# Patient Record
Sex: FEMALE | Race: WHITE | Employment: FULL TIME | URBAN - METROPOLITAN AREA
[De-identification: names, ages, dates, MRNs, and addresses within clinical notes are randomized per-mention and may not be internally consistent; named-entity substitution may affect disease eponyms.]

---

## 2020-09-18 ENCOUNTER — APPOINTMENT (OUTPATIENT)
Dept: GENERAL RADIOLOGY | Age: 60
DRG: 287 | End: 2020-09-18
Attending: EMERGENCY MEDICINE
Payer: COMMERCIAL

## 2020-09-18 ENCOUNTER — HOSPITAL ENCOUNTER (INPATIENT)
Age: 60
LOS: 2 days | Discharge: HOME | DRG: 287 | End: 2020-09-20
Attending: EMERGENCY MEDICINE | Admitting: INTERNAL MEDICINE
Payer: COMMERCIAL

## 2020-09-18 DIAGNOSIS — I10 HYPERTENSION, UNCONTROLLED: ICD-10-CM

## 2020-09-18 DIAGNOSIS — I48.91 ATRIAL FIBRILLATION WITH RVR (HCC): Primary | ICD-10-CM

## 2020-09-18 PROBLEM — C55 UTERINE CANCER (HCC): Status: ACTIVE | Noted: 2020-09-18

## 2020-09-18 PROBLEM — R07.9 CHEST PAIN: Status: ACTIVE | Noted: 2020-09-18

## 2020-09-18 PROBLEM — I48.0 PAROXYSMAL ATRIAL FIBRILLATION (HCC): Status: ACTIVE | Noted: 2020-09-18

## 2020-09-18 PROBLEM — E78.2 MIXED HYPERLIPIDEMIA: Status: ACTIVE | Noted: 2020-09-18

## 2020-09-18 LAB
ALBUMIN SERPL-MCNC: 4 G/DL (ref 3.2–4.6)
ALBUMIN/GLOB SERPL: 1 {RATIO} (ref 1.2–3.5)
ALP SERPL-CCNC: 142 U/L (ref 50–136)
ALT SERPL-CCNC: 89 U/L (ref 12–65)
ANION GAP SERPL CALC-SCNC: 11 MMOL/L (ref 7–16)
AST SERPL-CCNC: 47 U/L (ref 15–37)
BASOPHILS # BLD: 0.1 K/UL (ref 0–0.2)
BASOPHILS NFR BLD: 1 % (ref 0–2)
BILIRUB SERPL-MCNC: 0.2 MG/DL (ref 0.2–1.1)
BUN SERPL-MCNC: 20 MG/DL (ref 8–23)
CALCIUM SERPL-MCNC: 8.9 MG/DL (ref 8.3–10.4)
CHLORIDE SERPL-SCNC: 103 MMOL/L (ref 98–107)
CO2 SERPL-SCNC: 24 MMOL/L (ref 21–32)
CREAT SERPL-MCNC: 1.12 MG/DL (ref 0.6–1)
DIFFERENTIAL METHOD BLD: ABNORMAL
EOSINOPHIL # BLD: 0.1 K/UL (ref 0–0.8)
EOSINOPHIL NFR BLD: 1 % (ref 0.5–7.8)
ERYTHROCYTE [DISTWIDTH] IN BLOOD BY AUTOMATED COUNT: 14.8 % (ref 11.9–14.6)
GLOBULIN SER CALC-MCNC: 4.1 G/DL (ref 2.3–3.5)
GLUCOSE SERPL-MCNC: 205 MG/DL (ref 65–100)
HCT VFR BLD AUTO: 44.9 % (ref 35.8–46.3)
HGB BLD-MCNC: 14.6 G/DL (ref 11.7–15.4)
IMM GRANULOCYTES # BLD AUTO: 0 K/UL (ref 0–0.5)
IMM GRANULOCYTES NFR BLD AUTO: 0 % (ref 0–5)
LYMPHOCYTES # BLD: 1.9 K/UL (ref 0.5–4.6)
LYMPHOCYTES NFR BLD: 11 % (ref 13–44)
MAGNESIUM SERPL-MCNC: 2 MG/DL (ref 1.8–2.4)
MCH RBC QN AUTO: 28.7 PG (ref 26.1–32.9)
MCHC RBC AUTO-ENTMCNC: 32.5 G/DL (ref 31.4–35)
MCV RBC AUTO: 88.4 FL (ref 79.6–97.8)
MONOCYTES # BLD: 1.3 K/UL (ref 0.1–1.3)
MONOCYTES NFR BLD: 8 % (ref 4–12)
NEUTS SEG # BLD: 13.3 K/UL (ref 1.7–8.2)
NEUTS SEG NFR BLD: 80 % (ref 43–78)
NRBC # BLD: 0 K/UL (ref 0–0.2)
PLATELET # BLD AUTO: 281 K/UL (ref 150–450)
PMV BLD AUTO: 10.6 FL (ref 9.4–12.3)
POTASSIUM SERPL-SCNC: 3.5 MMOL/L (ref 3.5–5.1)
PROCALCITONIN SERPL-MCNC: <0.05 NG/ML
PROT SERPL-MCNC: 8.1 G/DL (ref 6.3–8.2)
RBC # BLD AUTO: 5.08 M/UL (ref 4.05–5.2)
SODIUM SERPL-SCNC: 138 MMOL/L (ref 136–145)
T4 FREE SERPL-MCNC: 1 NG/DL (ref 0.78–1.46)
TROPONIN-HIGH SENSITIVITY: 126.9 PG/ML (ref 0–14)
TSH SERPL DL<=0.005 MIU/L-ACNC: 2.59 UIU/ML (ref 0.36–3.74)
WBC # BLD AUTO: 16.7 K/UL (ref 4.3–11.1)

## 2020-09-18 PROCEDURE — 84439 ASSAY OF FREE THYROXINE: CPT

## 2020-09-18 PROCEDURE — 71045 X-RAY EXAM CHEST 1 VIEW: CPT

## 2020-09-18 PROCEDURE — 85025 COMPLETE CBC W/AUTO DIFF WBC: CPT

## 2020-09-18 PROCEDURE — 84484 ASSAY OF TROPONIN QUANT: CPT

## 2020-09-18 PROCEDURE — 96365 THER/PROPH/DIAG IV INF INIT: CPT

## 2020-09-18 PROCEDURE — 2709999900 HC NON-CHARGEABLE SUPPLY

## 2020-09-18 PROCEDURE — 74011000250 HC RX REV CODE- 250: Performed by: EMERGENCY MEDICINE

## 2020-09-18 PROCEDURE — 93005 ELECTROCARDIOGRAM TRACING: CPT | Performed by: EMERGENCY MEDICINE

## 2020-09-18 PROCEDURE — 99285 EMERGENCY DEPT VISIT HI MDM: CPT

## 2020-09-18 PROCEDURE — 80053 COMPREHEN METABOLIC PANEL: CPT

## 2020-09-18 PROCEDURE — 83735 ASSAY OF MAGNESIUM: CPT

## 2020-09-18 PROCEDURE — 74011000258 HC RX REV CODE- 258: Performed by: EMERGENCY MEDICINE

## 2020-09-18 PROCEDURE — 74011250636 HC RX REV CODE- 250/636: Performed by: INTERNAL MEDICINE

## 2020-09-18 PROCEDURE — 96376 TX/PRO/DX INJ SAME DRUG ADON: CPT

## 2020-09-18 PROCEDURE — 84443 ASSAY THYROID STIM HORMONE: CPT

## 2020-09-18 PROCEDURE — 84145 PROCALCITONIN (PCT): CPT

## 2020-09-18 PROCEDURE — 65660000000 HC RM CCU STEPDOWN

## 2020-09-18 PROCEDURE — 74011250636 HC RX REV CODE- 250/636: Performed by: EMERGENCY MEDICINE

## 2020-09-18 PROCEDURE — 74011250637 HC RX REV CODE- 250/637: Performed by: INTERNAL MEDICINE

## 2020-09-18 RX ORDER — HYDROCODONE BITARTRATE AND ACETAMINOPHEN 5; 325 MG/1; MG/1
1 TABLET ORAL
Status: DISCONTINUED | OUTPATIENT
Start: 2020-09-18 | End: 2020-09-19 | Stop reason: SDUPTHER

## 2020-09-18 RX ORDER — LISINOPRIL 40 MG/1
40 TABLET ORAL DAILY
Status: ON HOLD | COMMUNITY
End: 2020-09-20 | Stop reason: SDUPTHER

## 2020-09-18 RX ORDER — ACETAMINOPHEN 325 MG/1
650 TABLET ORAL
Status: DISCONTINUED | OUTPATIENT
Start: 2020-09-18 | End: 2020-09-19 | Stop reason: SDUPTHER

## 2020-09-18 RX ORDER — SODIUM CHLORIDE 0.9 % (FLUSH) 0.9 %
5-40 SYRINGE (ML) INJECTION EVERY 8 HOURS
Status: DISCONTINUED | OUTPATIENT
Start: 2020-09-18 | End: 2020-09-20 | Stop reason: HOSPADM

## 2020-09-18 RX ORDER — HEPARIN SODIUM 5000 [USP'U]/ML
5000 INJECTION, SOLUTION INTRAVENOUS; SUBCUTANEOUS ONCE
Status: COMPLETED | OUTPATIENT
Start: 2020-09-18 | End: 2020-09-18

## 2020-09-18 RX ORDER — ROSUVASTATIN CALCIUM 20 MG/1
40 TABLET, COATED ORAL
Status: DISCONTINUED | OUTPATIENT
Start: 2020-09-18 | End: 2020-09-20 | Stop reason: HOSPADM

## 2020-09-18 RX ORDER — FUROSEMIDE 40 MG/1
40 TABLET ORAL
COMMUNITY

## 2020-09-18 RX ORDER — ROSUVASTATIN CALCIUM 10 MG/1
10 TABLET, COATED ORAL
Status: DISCONTINUED | OUTPATIENT
Start: 2020-09-18 | End: 2020-09-18

## 2020-09-18 RX ORDER — CARVEDILOL 6.25 MG/1
6.25 TABLET ORAL 2 TIMES DAILY WITH MEALS
COMMUNITY
End: 2020-09-20

## 2020-09-18 RX ORDER — DILTIAZEM HYDROCHLORIDE 5 MG/ML
10 INJECTION INTRAVENOUS ONCE
Status: COMPLETED | OUTPATIENT
Start: 2020-09-18 | End: 2020-09-18

## 2020-09-18 RX ORDER — CLONIDINE HYDROCHLORIDE 0.1 MG/1
0.1 TABLET ORAL EVERY 12 HOURS
Status: DISCONTINUED | OUTPATIENT
Start: 2020-09-18 | End: 2020-09-19

## 2020-09-18 RX ORDER — GUAIFENESIN 100 MG/5ML
81 LIQUID (ML) ORAL DAILY
Status: DISCONTINUED | OUTPATIENT
Start: 2020-09-19 | End: 2020-09-20 | Stop reason: HOSPADM

## 2020-09-18 RX ORDER — ROSUVASTATIN CALCIUM 10 MG/1
10 TABLET, COATED ORAL
Status: ON HOLD | COMMUNITY
End: 2020-09-20 | Stop reason: SDUPTHER

## 2020-09-18 RX ORDER — METOPROLOL TARTRATE 25 MG/1
25 TABLET, FILM COATED ORAL EVERY 6 HOURS
Status: DISCONTINUED | OUTPATIENT
Start: 2020-09-18 | End: 2020-09-19

## 2020-09-18 RX ORDER — CHLORTHALIDONE 25 MG/1
25 TABLET ORAL DAILY
Status: DISCONTINUED | OUTPATIENT
Start: 2020-09-19 | End: 2020-09-19

## 2020-09-18 RX ORDER — LISINOPRIL 20 MG/1
40 TABLET ORAL DAILY
Status: DISCONTINUED | OUTPATIENT
Start: 2020-09-19 | End: 2020-09-19

## 2020-09-18 RX ORDER — HEPARIN SODIUM 5000 [USP'U]/100ML
12-25 INJECTION, SOLUTION INTRAVENOUS
Status: DISCONTINUED | OUTPATIENT
Start: 2020-09-18 | End: 2020-09-18 | Stop reason: SDUPTHER

## 2020-09-18 RX ORDER — CLONIDINE HYDROCHLORIDE 0.1 MG/1
0.1 TABLET ORAL 2 TIMES DAILY
COMMUNITY
End: 2020-09-20

## 2020-09-18 RX ORDER — NITROGLYCERIN 0.4 MG/1
0.4 TABLET SUBLINGUAL
Status: DISCONTINUED | OUTPATIENT
Start: 2020-09-18 | End: 2020-09-20 | Stop reason: HOSPADM

## 2020-09-18 RX ORDER — SODIUM CHLORIDE 0.9 % (FLUSH) 0.9 %
5-40 SYRINGE (ML) INJECTION AS NEEDED
Status: DISCONTINUED | OUTPATIENT
Start: 2020-09-18 | End: 2020-09-20 | Stop reason: HOSPADM

## 2020-09-18 RX ORDER — HEPARIN SODIUM 5000 [USP'U]/100ML
12-25 INJECTION, SOLUTION INTRAVENOUS
Status: DISCONTINUED | OUTPATIENT
Start: 2020-09-18 | End: 2020-09-20

## 2020-09-18 RX ORDER — CHLORTHALIDONE 25 MG/1
25 TABLET ORAL DAILY
COMMUNITY
End: 2020-09-20

## 2020-09-18 RX ADMIN — SODIUM CHLORIDE 12.5 MG/HR: 900 INJECTION, SOLUTION INTRAVENOUS at 20:15

## 2020-09-18 RX ADMIN — METOPROLOL TARTRATE 25 MG: 25 TABLET, FILM COATED ORAL at 20:44

## 2020-09-18 RX ADMIN — HEPARIN SODIUM 12 UNITS/KG/HR: 5000 INJECTION, SOLUTION INTRAVENOUS at 22:35

## 2020-09-18 RX ADMIN — DILTIAZEM HYDROCHLORIDE 10 MG: 5 INJECTION INTRAVENOUS at 19:15

## 2020-09-18 RX ADMIN — ROSUVASTATIN CALCIUM 40 MG: 20 TABLET, COATED ORAL at 22:37

## 2020-09-18 RX ADMIN — HEPARIN SODIUM 5000 UNITS: 5000 INJECTION INTRAVENOUS; SUBCUTANEOUS at 22:37

## 2020-09-18 RX ADMIN — SODIUM CHLORIDE 10 MG/HR: 900 INJECTION, SOLUTION INTRAVENOUS at 19:22

## 2020-09-18 RX ADMIN — DILTIAZEM HYDROCHLORIDE 10 MG: 5 INJECTION INTRAVENOUS at 20:16

## 2020-09-18 NOTE — ED PROVIDER NOTES
Presents with complaint of chest pain that started several hours prior to arrival.  Patient is from [facility] and has a cardiologist that she sees for hypertension. She does not know any of her meds and missed her doses today. She is here visiting. She was given Adenocard x2 with EMS and then Cardizem which improved her heart rate. Patient states the pain is achy. The history is provided by the patient. Rapid Heart Rate   This is a new problem. The current episode started 3 to 5 hours ago. The problem occurs constantly. The problem has been gradually worsening. Associated symptoms include chest pain and shortness of breath. Pertinent negatives include no abdominal pain. Nothing aggravates the symptoms. Nothing relieves the symptoms. She has tried nothing for the symptoms. No past medical history on file. No past surgical history on file. No family history on file.     Social History     Socioeconomic History    Marital status: SINGLE     Spouse name: Not on file    Number of children: Not on file    Years of education: Not on file    Highest education level: Not on file   Occupational History    Not on file   Social Needs    Financial resource strain: Not on file    Food insecurity     Worry: Not on file     Inability: Not on file    Transportation needs     Medical: Not on file     Non-medical: Not on file   Tobacco Use    Smoking status: Not on file   Substance and Sexual Activity    Alcohol use: Not on file    Drug use: Not on file    Sexual activity: Not on file   Lifestyle    Physical activity     Days per week: Not on file     Minutes per session: Not on file    Stress: Not on file   Relationships    Social connections     Talks on phone: Not on file     Gets together: Not on file     Attends [employer] service: Not on file     Active member of club or organization: Not on file     Attends meetings of clubs or organizations: Not on file     Relationship status: Not on file    Intimate partner violence     Fear of current or ex partner: Not on file     Emotionally abused: Not on file     Physically abused: Not on file     Forced sexual activity: Not on file   Other Topics Concern    Not on file   Social History Narrative    Not on file         ALLERGIES: Patient has no known allergies. Review of Systems   Constitutional: Negative for chills and fever. Respiratory: Positive for shortness of breath. Cardiovascular: Positive for chest pain. Gastrointestinal: Negative for abdominal pain. All other systems reviewed and are negative. Vitals:    09/18/20 1902 09/18/20 1914 09/18/20 1923 09/18/20 1932   BP: (!) 171/99 (!) 192/80 (!) 175/81    Pulse: (!) 156 (!) 144 (!) 140    Resp:       Temp:       SpO2: 97% 95% 94% 97%   Weight:       Height:                Physical Exam  Vitals signs and nursing note reviewed. Constitutional:       General: She is not in acute distress. Appearance: Normal appearance. She is well-developed. She is obese. She is not ill-appearing or diaphoretic. HENT:      Head: Normocephalic and atraumatic. Eyes:      General:         Right eye: No discharge. Left eye: No discharge. Conjunctiva/sclera: Conjunctivae normal.   Neck:      Musculoskeletal: Normal range of motion and neck supple. Cardiovascular:      Rate and Rhythm: Tachycardia present. Rhythm irregular. Pulmonary:      Effort: Pulmonary effort is normal. No respiratory distress. Breath sounds: Normal breath sounds. Abdominal:      General: There is no distension. Palpations: Abdomen is soft. Tenderness: There is no abdominal tenderness. Musculoskeletal: Normal range of motion. General: No swelling or tenderness. Right lower leg: No edema. Left lower leg: No edema. Skin:     General: Skin is warm and dry. Capillary Refill: Capillary refill takes less than 2 seconds.    Neurological:      General: No focal deficit present. Mental Status: She is alert and oriented to person, place, and time. Cranial Nerves: No cranial nerve deficit. Psychiatric:         Mood and Affect: Mood normal.         Behavior: Behavior normal.         Thought Content: Thought content normal.          MDM  Number of Diagnoses or Management Options  Diagnosis management comments: JAIME. fib with RVR. Given a bolus of Cardizem with improvement in her heart rate from 150s-170s to 130s-140s. Cardizem gtt started. No records in the chart to get her current blood pressure medications. We will wait for her doctor to get here. Discussed with [facility].        Amount and/or Complexity of Data Reviewed  Clinical lab tests: ordered and reviewed  Tests in the radiology section of CPT®: ordered and reviewed  Review and summarize past medical records: yes  Discuss the patient with other providers: yes  Independent visualization of images, tracings, or specimens: yes    Risk of Complications, Morbidity, and/or Mortality  Presenting problems: high  Diagnostic procedures: moderate  Management options: high    Patient Progress  Patient progress: improved         Procedures

## 2020-09-18 NOTE — ED TRIAGE NOTES
Pt arrives via EMS from home. EMS called out for chest pain, states /140 initially, , afib RVR. Pt has not been compliant with HTN meds and denies hx of a fib. States chest pain started today. States nausea. Given 12mg adenosine, did not do anything, given 20mg cardizem, dropped to 120-130. Pt alert and oriented states pain 3/10. NAD. Masked.

## 2020-09-19 LAB
ANION GAP SERPL CALC-SCNC: 9 MMOL/L (ref 7–16)
APTT PPP: 152.5 SEC (ref 24.3–35.4)
APTT PPP: 97.7 SEC (ref 24.3–35.4)
BUN SERPL-MCNC: 31 MG/DL (ref 8–23)
CALCIUM SERPL-MCNC: 8.8 MG/DL (ref 8.3–10.4)
CHLORIDE SERPL-SCNC: 105 MMOL/L (ref 98–107)
CHOLEST SERPL-MCNC: 167 MG/DL
CO2 SERPL-SCNC: 25 MMOL/L (ref 21–32)
CREAT SERPL-MCNC: 1.4 MG/DL (ref 0.6–1)
ERYTHROCYTE [DISTWIDTH] IN BLOOD BY AUTOMATED COUNT: 15 % (ref 11.9–14.6)
GLUCOSE SERPL-MCNC: 192 MG/DL (ref 65–100)
HCT VFR BLD AUTO: 40.4 % (ref 35.8–46.3)
HDLC SERPL-MCNC: 38 MG/DL (ref 40–60)
HDLC SERPL: 4.4 {RATIO}
HGB BLD-MCNC: 13.4 G/DL (ref 11.7–15.4)
LDLC SERPL CALC-MCNC: 69 MG/DL
LIPID PROFILE,FLP: ABNORMAL
MCH RBC QN AUTO: 29 PG (ref 26.1–32.9)
MCHC RBC AUTO-ENTMCNC: 33.2 G/DL (ref 31.4–35)
MCV RBC AUTO: 87.4 FL (ref 79.6–97.8)
NRBC # BLD: 0 K/UL (ref 0–0.2)
PLATELET # BLD AUTO: 284 K/UL (ref 150–450)
PMV BLD AUTO: 10.4 FL (ref 9.4–12.3)
POTASSIUM SERPL-SCNC: 3.5 MMOL/L (ref 3.5–5.1)
RBC # BLD AUTO: 4.62 M/UL (ref 4.05–5.2)
SODIUM SERPL-SCNC: 139 MMOL/L (ref 136–145)
TRIGL SERPL-MCNC: 300 MG/DL (ref 35–150)
TROPONIN-HIGH SENSITIVITY: 3902.9 PG/ML (ref 0–14)
VLDLC SERPL CALC-MCNC: 60 MG/DL (ref 6–23)
WBC # BLD AUTO: 15 K/UL (ref 4.3–11.1)

## 2020-09-19 PROCEDURE — 74011000250 HC RX REV CODE- 250: Performed by: INTERNAL MEDICINE

## 2020-09-19 PROCEDURE — 74011000258 HC RX REV CODE- 258: Performed by: INTERNAL MEDICINE

## 2020-09-19 PROCEDURE — 77030015766

## 2020-09-19 PROCEDURE — 77030016699 HC CATH ANGI DX INFN1 CARD -A

## 2020-09-19 PROCEDURE — 85027 COMPLETE CBC AUTOMATED: CPT

## 2020-09-19 PROCEDURE — 80048 BASIC METABOLIC PNL TOTAL CA: CPT

## 2020-09-19 PROCEDURE — 74011250636 HC RX REV CODE- 250/636: Performed by: INTERNAL MEDICINE

## 2020-09-19 PROCEDURE — C1769 GUIDE WIRE: HCPCS

## 2020-09-19 PROCEDURE — 85730 THROMBOPLASTIN TIME PARTIAL: CPT

## 2020-09-19 PROCEDURE — C1894 INTRO/SHEATH, NON-LASER: HCPCS

## 2020-09-19 PROCEDURE — 74011250637 HC RX REV CODE- 250/637: Performed by: INTERNAL MEDICINE

## 2020-09-19 PROCEDURE — B2151ZZ FLUOROSCOPY OF LEFT HEART USING LOW OSMOLAR CONTRAST: ICD-10-PCS | Performed by: INTERNAL MEDICINE

## 2020-09-19 PROCEDURE — 93306 TTE W/DOPPLER COMPLETE: CPT

## 2020-09-19 PROCEDURE — 93458 L HRT ARTERY/VENTRICLE ANGIO: CPT

## 2020-09-19 PROCEDURE — 77030029997 HC DEV COM RDL R BND TELE -B

## 2020-09-19 PROCEDURE — 4A023N7 MEASUREMENT OF CARDIAC SAMPLING AND PRESSURE, LEFT HEART, PERCUTANEOUS APPROACH: ICD-10-PCS | Performed by: INTERNAL MEDICINE

## 2020-09-19 PROCEDURE — 99152 MOD SED SAME PHYS/QHP 5/>YRS: CPT

## 2020-09-19 PROCEDURE — 74011250637 HC RX REV CODE- 250/637: Performed by: NURSE PRACTITIONER

## 2020-09-19 PROCEDURE — 74011000636 HC RX REV CODE- 636: Performed by: INTERNAL MEDICINE

## 2020-09-19 PROCEDURE — 65660000000 HC RM CCU STEPDOWN

## 2020-09-19 PROCEDURE — B2111ZZ FLUOROSCOPY OF MULTIPLE CORONARY ARTERIES USING LOW OSMOLAR CONTRAST: ICD-10-PCS | Performed by: INTERNAL MEDICINE

## 2020-09-19 PROCEDURE — 36415 COLL VENOUS BLD VENIPUNCTURE: CPT

## 2020-09-19 PROCEDURE — 80061 LIPID PANEL: CPT

## 2020-09-19 PROCEDURE — 84484 ASSAY OF TROPONIN QUANT: CPT

## 2020-09-19 RX ORDER — FENTANYL CITRATE 50 UG/ML
25-50 INJECTION, SOLUTION INTRAMUSCULAR; INTRAVENOUS
Status: DISCONTINUED | OUTPATIENT
Start: 2020-09-19 | End: 2020-09-20 | Stop reason: HOSPADM

## 2020-09-19 RX ORDER — METOPROLOL TARTRATE 25 MG/1
25 TABLET, FILM COATED ORAL 2 TIMES DAILY
Status: DISCONTINUED | OUTPATIENT
Start: 2020-09-19 | End: 2020-09-20 | Stop reason: HOSPADM

## 2020-09-19 RX ORDER — SODIUM CHLORIDE 9 MG/ML
75 INJECTION, SOLUTION INTRAVENOUS CONTINUOUS
Status: DISCONTINUED | OUTPATIENT
Start: 2020-09-19 | End: 2020-09-20

## 2020-09-19 RX ORDER — LISINOPRIL 20 MG/1
20 TABLET ORAL DAILY
Status: DISCONTINUED | OUTPATIENT
Start: 2020-09-20 | End: 2020-09-20

## 2020-09-19 RX ORDER — HYDROCODONE BITARTRATE AND ACETAMINOPHEN 5; 325 MG/1; MG/1
1 TABLET ORAL
Status: DISCONTINUED | OUTPATIENT
Start: 2020-09-19 | End: 2020-09-20 | Stop reason: HOSPADM

## 2020-09-19 RX ORDER — SODIUM CHLORIDE 0.9 % (FLUSH) 0.9 %
5-40 SYRINGE (ML) INJECTION AS NEEDED
Status: DISCONTINUED | OUTPATIENT
Start: 2020-09-19 | End: 2020-09-20 | Stop reason: HOSPADM

## 2020-09-19 RX ORDER — ONDANSETRON 2 MG/ML
4 INJECTION INTRAMUSCULAR; INTRAVENOUS
Status: DISCONTINUED | OUTPATIENT
Start: 2020-09-19 | End: 2020-09-20 | Stop reason: HOSPADM

## 2020-09-19 RX ORDER — AMIODARONE HYDROCHLORIDE 200 MG/1
200 TABLET ORAL 2 TIMES DAILY
Status: DISCONTINUED | OUTPATIENT
Start: 2020-09-19 | End: 2020-09-20 | Stop reason: HOSPADM

## 2020-09-19 RX ORDER — MIDAZOLAM HYDROCHLORIDE 1 MG/ML
.5-2 INJECTION, SOLUTION INTRAMUSCULAR; INTRAVENOUS
Status: DISCONTINUED | OUTPATIENT
Start: 2020-09-19 | End: 2020-09-20 | Stop reason: HOSPADM

## 2020-09-19 RX ORDER — HEPARIN SODIUM 200 [USP'U]/100ML
3 INJECTION, SOLUTION INTRAVENOUS CONTINUOUS
Status: DISCONTINUED | OUTPATIENT
Start: 2020-09-19 | End: 2020-09-20 | Stop reason: HOSPADM

## 2020-09-19 RX ORDER — ACETAMINOPHEN 325 MG/1
650 TABLET ORAL
Status: DISCONTINUED | OUTPATIENT
Start: 2020-09-19 | End: 2020-09-20 | Stop reason: HOSPADM

## 2020-09-19 RX ORDER — SODIUM CHLORIDE 0.9 % (FLUSH) 0.9 %
5-40 SYRINGE (ML) INJECTION EVERY 8 HOURS
Status: DISCONTINUED | OUTPATIENT
Start: 2020-09-19 | End: 2020-09-20 | Stop reason: HOSPADM

## 2020-09-19 RX ORDER — HEPARIN SODIUM 10000 [USP'U]/ML
1000-10000 INJECTION, SOLUTION INTRAVENOUS; SUBCUTANEOUS
Status: DISCONTINUED | OUTPATIENT
Start: 2020-09-19 | End: 2020-09-20 | Stop reason: HOSPADM

## 2020-09-19 RX ORDER — LIDOCAINE HYDROCHLORIDE 10 MG/ML
10-30 INJECTION, SOLUTION EPIDURAL; INFILTRATION; INTRACAUDAL; PERINEURAL ONCE
Status: COMPLETED | OUTPATIENT
Start: 2020-09-19 | End: 2020-09-19

## 2020-09-19 RX ADMIN — VERAPAMIL HYDROCHLORIDE 2 ML: 2.5 INJECTION, SOLUTION INTRAVENOUS at 14:28

## 2020-09-19 RX ADMIN — FENTANYL CITRATE 50 MCG: 50 INJECTION INTRAMUSCULAR; INTRAVENOUS at 14:33

## 2020-09-19 RX ADMIN — SODIUM CHLORIDE 7.5 MG/HR: 900 INJECTION, SOLUTION INTRAVENOUS at 02:22

## 2020-09-19 RX ADMIN — HEPARIN SODIUM 3 UNITS/HR: 200 INJECTION, SOLUTION INTRAVENOUS at 15:00

## 2020-09-19 RX ADMIN — METOPROLOL TARTRATE 25 MG: 25 TABLET, FILM COATED ORAL at 17:43

## 2020-09-19 RX ADMIN — Medication 10 ML: at 21:43

## 2020-09-19 RX ADMIN — LIDOCAINE HYDROCHLORIDE 10 ML: 10 INJECTION, SOLUTION EPIDURAL; INFILTRATION; INTRACAUDAL; PERINEURAL at 14:28

## 2020-09-19 RX ADMIN — LISINOPRIL 40 MG: 20 TABLET ORAL at 08:12

## 2020-09-19 RX ADMIN — MIDAZOLAM 2 MG: 1 INJECTION INTRAMUSCULAR; INTRAVENOUS at 14:33

## 2020-09-19 RX ADMIN — Medication 10 ML: at 05:59

## 2020-09-19 RX ADMIN — IOPAMIDOL 65 ML: 755 INJECTION, SOLUTION INTRAVENOUS at 14:39

## 2020-09-19 RX ADMIN — ROSUVASTATIN CALCIUM 40 MG: 20 TABLET, COATED ORAL at 21:41

## 2020-09-19 RX ADMIN — CLONIDINE HYDROCHLORIDE 0.1 MG: 0.1 TABLET ORAL at 08:12

## 2020-09-19 RX ADMIN — ASPIRIN 81 MG: 81 TABLET, CHEWABLE ORAL at 08:12

## 2020-09-19 RX ADMIN — CHLORTHALIDONE 25 MG: 25 TABLET ORAL at 08:12

## 2020-09-19 RX ADMIN — AMIODARONE HYDROCHLORIDE 200 MG: 200 TABLET ORAL at 17:43

## 2020-09-19 NOTE — ROUTINE PROCESS
TRANSFER - IN REPORT: 
 
Verbal report received from Tanya jayce Maza being received from ED for routine progression of care. Report consisted of patients Situation, Background, Assessment and Recommendations(SBAR). Information from the following report(s) SBAR, ED Summary, MAR, Recent Results, Med Rec Status and Cardiac Rhythm Afib was reviewed. Opportunity for questions and clarification was provided. Assessment completed upon patients arrival to unit and care assumed. Patient received to room 310. Patient connected to monitor and assessment completed. Plan of care reviewed. Patient oriented to room and call light. Patient aware to use call light to communicate any chest pain or needs. Cardizem drip rate verified with RN. Admission skin assessment completed with second RN and reveals the following: Sacrum/coccyx and heels are free of skin breakdown and/or pressure sores.

## 2020-09-19 NOTE — PROCEDURES
[facility]  CARDIAC CATH    Name:  Vanessa Steward  MR#:  [id number]  :  1960  ACCOUNT #:  [de-identified]  DATE OF SERVICE:  2020    PROCEDURES PERFORMED:  Left heart catheterization, selective coronary arteriography, and left ventriculogram via the right radial approach. PREOPERATIVE DIAGNOSES:  Non-ST-elevation myocardial infarction in the setting of atrial fibrillation with rapid ventricular response. POSTOPERATIVE DIAGNOSIS: Multivessel coronary artery disease. SURGEON:  Ophelia Glover MD    ASSISTANT:  None. ESTIMATED BLOOD LOSS:  Less than 5 mL. SPECIMENS REMOVED:  None. COMPLICATIONS:  None. IMPLANTS:  None. ANESTHESIA:  The patient received moderate supervised conscious sedation with 2 mg Versed and 50 mcg fentanyl. Sedation start time was 1429 with a procedure completion time of 1442. Sedation was administered by Julieta Harley RN, under my supervision. FINDINGS:  As below. PROCEDURE:  After informed consent was obtained, the patient was brought to the cardiac catheterization lab. The right radial artery was prepped and draped in the usual sterile fashion. Utilizing modified Seldinger technique and micropuncture needle, the right radial artery was entered. A 6-Maori Terumo Slender sheath was placed without difficulty. A radial cocktail consisting of 2000 units of heparin, 2 mg of verapamil, and 200 mcg of nitroglycerin was administered. A 5-Maori Tiger 4.0 catheter was used to select and engage the ostium of the left main coronary artery and right coronary artery, respectively. Selective injection verification was performed. A pigtail catheter was used to cross the aortic valve and the left ventricle. Hemodynamic measurements and left ventriculogram were obtained. Left ventricular aortic pressure gradient was obtained by pullback technique.     At the conclusion of the diagnostic procedure, the radial sheath was removed and Spoke with patient and got him scheduled for his Colon with MAC with Dr. Pearson. Patient is scheduled on 4/3/20 at 10:30 .   pneumatic band was placed with good hemostasis. No complications were encountered. CONTRAST:  Isovue 65. HEMODYNAMIC RESULTS:  1. Aortic pressure 115/58. 2.  Left ventricular end-diastolic pressure was 26.  3.  There is no significant gradient across the aortic valve. ANGIOGRAPHIC RESULTS:  1. Left main coronary artery:  Large-caliber vessel. Angiographically normal.  2.  LAD:  It is a medium-caliber vessel. Contains 20% proximal stenosis, 20% mid stenosis. 3.  First diagonal artery: It is a small-caliber vessel. Contains 70% mid stenosis. There does appear to be a bypassable target in the distal vessel. 4.  Left circumflex:  Medium-caliber, likely codominant vessel, 40% mid and 70% distal stenosis. 5.  First obtuse marginal artery:  Small-caliber vessel. Diffuse 70% to 80% proximal stenosis. Mid to distal vessel appears to be patent and adequate for coronary artery bypass grafting. 6.  Second obtuse marginal:  Medium-caliber vessel. Diffuse 60% proximal stenosis. 7.  Third obtuse marginal:  Medium-caliber vessel, 80% proximal stenosis. 8.  Right coronary artery:  100% occluded in the mid segment, 90% proximal occlusion. There is left-to-right collateralization of the distal PDA which appears to be a sizeable vessel. 9.  Left ventriculogram performed in the BUTLER projection shows normal left ventricular systolic function. EF 60% to 65%. No focal segmental wall motion abnormalities. Aortic root is nondilated. CONCLUSIONS:  1.  Multivessel coronary artery disease. 2.  Normal left ventricular systolic function. PLAN:  Consider CT Surgery consultation for coronary artery bypass grafting, surgical maze, and left atrial appendage occlusion given the recent admission with atrial fibrillation. Potential targets would be first diagonal, first, second, and third obtuse marginal, and PDA.       MD NUZHAT [id number]  D:  09/19/2020 14:52  T:  09/19/2020 16:00  JOB #:  [id number]

## 2020-09-19 NOTE — PROCEDURES
Brief Cardiac Procedure Note    Patient: Aaron Ahn MRN: [id number]  SSN: [id number]    YOB: 1960  Age: 56 y.o. Sex: female      Date of Procedure: 9/19/2020     Pre-procedure Diagnosis: NSTEMI    Post-procedure Diagnosis: Multi-vessel CAD. Procedure: Left Heart Catheterization    Brief Description of Procedure: See above    Performed By: Paresh Garcia MD     Assistants: None    Anesthesia: Moderate Sedation    Estimated Blood Loss: Less than 10 mL      Specimens: None    Implants: None    Findings: Multivessel CAD    Complications: None    Recommendations: CTS evaluation for CABG evaluation.   Graft Targets:   D1/ OM1/OM2/OM3/ RCA    Signed By: Paresh Garcia MD     September 19, 2020

## 2020-09-19 NOTE — PROGRESS NOTES
TRANSFER - IN REPORT:    Verbal report received from Spenser Rosales [facility] [employer] being received from [address] [place] [address] for routine progression of care      Report consisted of patients Situation, Background, Assessment and Recommendations(SBAR). Information from the following report(s) Procedure Summary was reviewed with the receiving nurse. Opportunity for questions and clarification was provided. Assessment completed upon patients arrival to unit and care assumed. R radial cath site visualized, no bleeding or hematoma noted.

## 2020-09-19 NOTE — ROUTINE PROCESS
Bedside and verbal report given to [facility] by Renetta Hall. Report included the following information SBAR, Kardex, Intake/Output and MAR. Oncoming RN assumed care of the patient. Heparin drip verified per MAR. R rad cath site visualized.

## 2020-09-19 NOTE — PROGRESS NOTES
TRANSFER - OUT REPORT:    Verbal report given to tele RN(name) on Donalynn Rise  being transferred to tele(unit) for routine progression of care       Report consisted of patients Situation, Background, Assessment and   Recommendations(SBAR). Information from the following report(s) SBAR, Kardex, Procedure Summary and MAR was reviewed with the receiving nurse. Lines:   Peripheral IV 09/18/20 Left Antecubital (Active)   Site Assessment Clean, dry, & intact 09/19/20 1205   Phlebitis Assessment 0 09/19/20 1205   Infiltration Assessment 0 09/19/20 1205   Dressing Status Clean, dry, & intact 09/19/20 1205   Dressing Type Tape;Transparent 09/19/20 1205   Hub Color/Line Status Flushed 09/19/20 1205   Alcohol Cap Used No 09/19/20 0420       Peripheral IV 09/19/20 Anterior;Distal;Right Forearm (Active)   Site Assessment Clean, dry, & intact 09/19/20 1205   Phlebitis Assessment 0 09/19/20 1205   Infiltration Assessment 0 09/19/20 1205   Dressing Status Clean, dry, & intact 09/19/20 1205   Dressing Type Tape;Transparent 09/19/20 1205   Hub Color/Line Status Flushed 09/19/20 1205        Opportunity for questions and clarification was provided. Patient transported with:   Registered Nurse  Tech          [facility] with Dr. Brody Score  Right radial access  No intervention. Multiple vessel disease.  Surgical consult  Versed 2mg   Fentanyl 50mcg  TR Band in place @ 14:45 With 10ml in the band

## 2020-09-19 NOTE — ROUTINE PROCESS
Bedside and Verbal report given to self by eN Velázquez RN. Report included SBAR, Kardex, ED Summary, Procedure Summary, Intake and Output and Cardiac Rhythm SB. Heparin gtt rate verified with off going RN.

## 2020-09-19 NOTE — PROGRESS NOTES
Pt. Had a 7.5 second pause and HR dropped down to 30. Cardizem gtt turned off and Pt.s BP assessed and noted to be at 95/57. Pt. States she has no chest pain but states she felt her HR drop. Pt. Is SB/junctional in the 40's. Luis Roa NP notified of Pt.s condition. Orders given to stop Cardizem and monitor Pt. Will continue to monitor Pt.

## 2020-09-19 NOTE — ROUTINE PROCESS
Bedside and Verbal shift change report given to self (oncoming nurse) by Svetlana Rangel (offgoing nurse). Report included the following information SBAR, Kardex, Intake/Output and MAR.

## 2020-09-19 NOTE — ROUTINE PROCESS
Bedside and Verbal report given to Shilpi Aquino RN by self. Report included SBAR, Kardex, ED summary, procedure summary, recent results and cardiac rhythm SB. Heparin gtt rate verified with oncoming RN.

## 2020-09-19 NOTE — H&P
[facility] CARDIOLOGY History & Physical                   Subjective:     Patient is a 68-year-old female with no prior established history of coronary artery disease or cardiac arrhythmias. However, she has multiple cardiovascular risk factors. She is in [place] taking care of her mother. She typically lives in [address] [place] [address]. She was at the grocery store when she noted chest discomfort. She describes as an ache in the center of her chest with associated dyspnea. She went to her mother's home and contact her sister who called EMS. Upon EMS arrival she was noted to be in atrial fibrillation with a rapid ventricular response. Admission EKG shows a heart rate of 174 with diffuse nonspecific ST and T wave changes. She has been started on Cardizem and states that with better rate control her chest pain has resolved. Her initial cardiac enzymes are negative. She states she had a stress test many years ago which was normal.  She has seen a cardiologist for poorly controlled hypertension. She is on 4-5 different blood pressure medications but cannot recall their names. She does know she takes Crestor. She is also been prescribed Lasix but she does not take this medication. Past Medical History:   Diagnosis Date    Essential hypertension 9/18/2020    Mixed hyperlipidemia 9/18/2020    Paroxysmal atrial fibrillation ([facility] [address] [address] 9/18/2020    Uterine cancer ([facility] [address] 9/18/2020      Past Surgical History:   Procedure Laterality Date    HX HYSTERECTOMY        No Known Allergies  Social History     Tobacco Use    Smoking status: Current Every Day Smoker     Packs/day: 0.50   Substance Use Topics    Alcohol use: Yes      FH:   Family History   Problem Relation Age of Onset    Coronary Artery Disease Mother     Coronary Artery Disease Father     Coronary Artery Disease Brother         Review of Systems   Constitutional: Negative for chills and fever. HENT: Negative for tinnitus.     Eyes: Negative for blurred vision. Respiratory: Positive for shortness of breath. Negative for cough. Cardiovascular: Positive for chest pain. Negative for orthopnea. Gastrointestinal: Negative for abdominal pain and nausea. Genitourinary: Negative for dysuria. Musculoskeletal: Positive for joint pain. Skin: Negative for rash. Neurological: Negative for tremors, sensory change and headaches. Endo/Heme/Allergies: Does not bruise/bleed easily. Psychiatric/Behavioral: Negative for depression and suicidal ideas. Objective:       Visit Vitals  BP (!) 168/79   Pulse (!) 143   Temp 98.6 °F (37 °C)   Resp 16   Ht 5' 3\" (1.6 m)   Wt 180 lb (81.6 kg)   SpO2 95%   BMI 31.89 kg/m²       No intake/output data recorded. No intake/output data recorded. Physical Exam  HENT:      Head: Atraumatic. Eyes:      Conjunctiva/sclera: Conjunctivae normal.      Pupils: Pupils are equal, round, and reactive to light. Neck:      Thyroid: No thyromegaly. Vascular: No JVD. Cardiovascular:      Rate and Rhythm: Tachycardia present. Rhythm irregular. Heart sounds: No murmur. Pulmonary:      Effort: Pulmonary effort is normal.      Breath sounds: Normal breath sounds. Abdominal:      General: Bowel sounds are normal. There is no distension. Palpations: Abdomen is soft. Tenderness: There is no abdominal tenderness. Skin:     General: Skin is warm. Findings: No rash. Neurological:      Mental Status: She is alert and oriented to person, place, and time. Psychiatric:         Mood and Affect: Mood normal.           ECG: Atrial fibrillation. Rate 174. Data Review:   Labs:   Recent Labs     09/18/20  1904      K 3.5   MG 2.0   BUN 20   CREA 1.12*   *   WBC 16.7*   HGB 14.6   HCT 44.9         No results found for: Siloam Bran, TNIPOC        Assessment/Plan:   Active Problems:    Paroxysmal atrial fibrillation ([address] (9/18/2020)  Admit to telemetry.   Rate control with Cardizem. Start oral Lopressor. Start intravenous heparin until ischemia evaluation complete. Potentially plan transesophageal echocardiogram and cardioversion tomorrow morning. Will make n.p.o.      Essential hypertension (9/18/2020)  Blood pressure poorly controlled. Start metoprolol and continue intravenous heparin. Will obtain her home medications and adjust regimen appropriately. She admits that she did not take her blood pressure medication this morning. Mixed hyperlipidemia (9/18/2020)  Crestor 40 mg a day. Uterine cancer ([address] (9/18/2020)  Status post hysterectomy. Not active issue. Chest pain (9/18/2020)  Symptoms concerning for angina. She may have underlying coronary artery disease but this is exacerbated by tachycardia with atrial fibrillation. Follow cardiac enzymes. May ultimately need ischemia evaluation with cardiac catheterization versus stress test based on clinical course. Tobacco Abuse  Smoking cessation discussed.                  Ophelia Glover MD-C  9/18/2020  8:34 PM

## 2020-09-20 VITALS
RESPIRATION RATE: 20 BRPM | HEART RATE: 66 BPM | WEIGHT: 179.9 LBS | OXYGEN SATURATION: 96 % | HEIGHT: 63 IN | BODY MASS INDEX: 31.88 KG/M2 | TEMPERATURE: 98.2 F | DIASTOLIC BLOOD PRESSURE: 87 MMHG | SYSTOLIC BLOOD PRESSURE: 152 MMHG

## 2020-09-20 LAB
ANION GAP SERPL CALC-SCNC: 9 MMOL/L (ref 7–16)
APTT PPP: 78.6 SEC (ref 24.3–35.4)
APTT PPP: 92.8 SEC (ref 24.3–35.4)
BUN SERPL-MCNC: 26 MG/DL (ref 8–23)
CALCIUM SERPL-MCNC: 8.3 MG/DL (ref 8.3–10.4)
CHLORIDE SERPL-SCNC: 106 MMOL/L (ref 98–107)
CO2 SERPL-SCNC: 21 MMOL/L (ref 21–32)
CREAT SERPL-MCNC: 1.13 MG/DL (ref 0.6–1)
ERYTHROCYTE [DISTWIDTH] IN BLOOD BY AUTOMATED COUNT: 15.1 % (ref 11.9–14.6)
GLUCOSE SERPL-MCNC: 276 MG/DL (ref 65–100)
HCT VFR BLD AUTO: 37.6 % (ref 35.8–46.3)
HGB BLD-MCNC: 12.5 G/DL (ref 11.7–15.4)
MCH RBC QN AUTO: 29.6 PG (ref 26.1–32.9)
MCHC RBC AUTO-ENTMCNC: 33.2 G/DL (ref 31.4–35)
MCV RBC AUTO: 88.9 FL (ref 79.6–97.8)
NRBC # BLD: 0 K/UL (ref 0–0.2)
PLATELET # BLD AUTO: 248 K/UL (ref 150–450)
PMV BLD AUTO: 11 FL (ref 9.4–12.3)
POTASSIUM SERPL-SCNC: 3.7 MMOL/L (ref 3.5–5.1)
RBC # BLD AUTO: 4.23 M/UL (ref 4.05–5.2)
SODIUM SERPL-SCNC: 136 MMOL/L (ref 136–145)
WBC # BLD AUTO: 9 K/UL (ref 4.3–11.1)

## 2020-09-20 PROCEDURE — 85027 COMPLETE CBC AUTOMATED: CPT

## 2020-09-20 PROCEDURE — 74011250637 HC RX REV CODE- 250/637: Performed by: INTERNAL MEDICINE

## 2020-09-20 PROCEDURE — 36415 COLL VENOUS BLD VENIPUNCTURE: CPT

## 2020-09-20 PROCEDURE — 85730 THROMBOPLASTIN TIME PARTIAL: CPT

## 2020-09-20 PROCEDURE — 74011250636 HC RX REV CODE- 250/636: Performed by: INTERNAL MEDICINE

## 2020-09-20 PROCEDURE — 80048 BASIC METABOLIC PNL TOTAL CA: CPT

## 2020-09-20 RX ORDER — LISINOPRIL 20 MG/1
20 TABLET ORAL DAILY
Status: DISCONTINUED | OUTPATIENT
Start: 2020-09-20 | End: 2020-09-20 | Stop reason: HOSPADM

## 2020-09-20 RX ORDER — LISINOPRIL 20 MG/1
20 TABLET ORAL DAILY
Qty: 30 TAB | Refills: 11 | Status: ON HOLD | OUTPATIENT
Start: 2020-09-20 | End: 2020-10-12 | Stop reason: SDUPTHER

## 2020-09-20 RX ORDER — AMIODARONE HYDROCHLORIDE 200 MG/1
200 TABLET ORAL 2 TIMES DAILY
Qty: 60 TAB | Refills: 11 | Status: SHIPPED | OUTPATIENT
Start: 2020-09-20 | End: 2020-10-12

## 2020-09-20 RX ORDER — ROSUVASTATIN CALCIUM 40 MG/1
40 TABLET, COATED ORAL
Qty: 30 TAB | Refills: 11 | Status: SHIPPED | OUTPATIENT
Start: 2020-09-20

## 2020-09-20 RX ORDER — NITROGLYCERIN 0.4 MG/1
0.4 TABLET SUBLINGUAL
Qty: 1 BOTTLE | Refills: 5 | Status: SHIPPED | OUTPATIENT
Start: 2020-09-20

## 2020-09-20 RX ORDER — GUAIFENESIN 100 MG/5ML
81 LIQUID (ML) ORAL DAILY
Status: SHIPPED | COMMUNITY
Start: 2020-09-21

## 2020-09-20 RX ORDER — METOPROLOL TARTRATE 25 MG/1
25 TABLET, FILM COATED ORAL 2 TIMES DAILY
Qty: 60 TAB | Refills: 11 | Status: SHIPPED | OUTPATIENT
Start: 2020-09-20 | End: 2020-10-12

## 2020-09-20 RX ADMIN — AMIODARONE HYDROCHLORIDE 200 MG: 200 TABLET ORAL at 08:32

## 2020-09-20 RX ADMIN — Medication 10 ML: at 06:54

## 2020-09-20 RX ADMIN — RIVAROXABAN 20 MG: 20 TABLET, FILM COATED ORAL at 10:05

## 2020-09-20 RX ADMIN — LISINOPRIL 20 MG: 20 TABLET ORAL at 04:54

## 2020-09-20 RX ADMIN — ASPIRIN 81 MG: 81 TABLET, CHEWABLE ORAL at 08:32

## 2020-09-20 RX ADMIN — METOPROLOL TARTRATE 25 MG: 25 TABLET, FILM COATED ORAL at 08:32

## 2020-09-20 RX ADMIN — Medication 10 ML: at 06:55

## 2020-09-20 RX ADMIN — HEPARIN SODIUM 12 UNITS/KG/HR: 5000 INJECTION, SOLUTION INTRAVENOUS at 07:28

## 2020-09-20 NOTE — ROUTINE PROCESS
Bedside and Verbal shift change report given to self (oncoming nurse) by Leanne Najera (offgoing nurse). Report included the following information SBAR, Kardex, Intake/Output and MAR. Heparin drip verified.

## 2020-09-20 NOTE — PROGRESS NOTES
LMSW spoke with pt prior to her discharge home today. She is in [place] visiting her Mother. She normally lives and works in [place], [place]. She is for discharge today with out pt follow up about CABG surgery. Pt denied any supportive care needs at this time but is aware of case management services for any supportive care that may be needed in the future. Care Management Interventions  PCP Verified by CM: (pt lives in [place] and she will follow up to locate a PCP in that area)  Mode of Transport at Discharge: (family)  Transition of Care Consult (CM Consult): Discharge Planning(Pt is insured by [employer] with pharmacy benefits.  )  Discharge Durable Medical Equipment: No  Physical Therapy Consult: No  Occupational Therapy Consult: No  Speech Therapy Consult: No  Current Support Network: Own Home, Family Lives Nearby(Pt lives in [place].    She is in [place] visiting her Mother and will return to her Mother today after her discharge.  )  Confirm Follow Up Transport: Family  Name of the Patient Representative Who was Provided with a Choice of Provider and Agrees with the Discharge Plan: pt  [address] Provided?: No  Discharge Location  Discharge Placement: Home

## 2020-09-20 NOTE — ROUTINE PROCESS
[address] September 20, 2020 RE: Aaron Ahn To Whom It May Concern, This is to certify that Aaron Ahn was hospitalized 9/18/20 to 9/20/20. She is not allowed to return to work until after being seen and cleared by CV Surgery. Please feel free to contact [facility]iology if you have any questions or concerns. Thank you for your assistance in this matter. Sincerely, Paul Das RN Per [facility]iology Aubrey Keita , NP 
[phone]

## 2020-09-20 NOTE — PROGRESS NOTES
Pt.s /68. Patrick Barrientos NP notified of Pt.s BP. Orders given to give 9 AM morning dose of lisinopril. Will continue to monitor Pt.

## 2020-09-20 NOTE — PROGRESS NOTES
Problem: Falls - Risk of  Goal: *Absence of Falls  Description: Document Sly Harrison Fall Risk and appropriate interventions in the flowsheet.   Outcome: Progressing Towards Goal  Note: Fall Risk Interventions:            Medication Interventions: Patient to call before getting OOB, Teach patient to arise slowly

## 2020-09-20 NOTE — ROUTINE PROCESS
Bedside and Verbal report given to Gutierrez Romero RN by self. Report included SBAR, Kardex, ED summary, procedure summary, recent results and cardiac rhythm SB. Heparin gtt rate verified with oncoming RN.

## 2020-09-20 NOTE — DISCHARGE SUMMARY
[facility] Cardiology Discharge Summary     Patient ID:  Aaron Ahn  [id number]  56 y.o.  1960    Admit date: 9/18/2020    Discharge date:  09/20/2020    Admitting Physician: Paresh Garcia MD     Discharge Physician: Ricco Morris NP/Dr. Ron    Admission Diagnoses: Atrial fibrillation [facility]) [I48.91]    Discharge Diagnoses:    Diagnosis    Paroxysmal atrial fibrillation ([address]    Essential hypertension    Mixed hyperlipidemia    Chest pain    Atrial fibrillation [facility])       Cardiology Procedures this admission:  Diagnostic left heart catheterization  EchoCardiogram  Consults: None    Hospital Course: Patient presented to the ER with c/o chest pain with associated dyspnea. EMS was summoned and she was noted to be in a fib with RVR @ 174. She was started on Cardizem and chest pain resolved with improved HR. BP was also elevated at 220/140. She was admitted and cardizem drip was continued. She converted to SB in the 40s overnight and the cardizem was stopped. Initial HS trop was 126.9 and repeat was 3902. Patient underwent cardiac catheterization by Dr. Kvng Mckeon. Patient was found to have MVD and will be evaluated for CABG by CT surgery as an OP. Patient tolerated the procedure well and was taken to the telemetry floor for recovery. Echo results:  -  Left ventricle: Systolic function was normal. Ejection fraction was  estimated in the range of 55 % to 60 %. There were no regional wall motion  abnormalities. There was mild concentric hypertrophy.     -  Left atrium: The atrium was mildly dilated.     -  Inferior vena cava, hepatic veins: The respirophasic change in diameter   Was more than 50%. The morning of discharge, patient was up feeling well without any complaints of chest pain or shortness of breath. Patient's right radial cath site was clean, dry and intact without hematoma or bruit. Patient's labs were WNL.  Patient was seen and examined by  Ariadne and determined stable and ready for discharge. For maximized medical therapy for CAD, patient will continue ACE, Beta Blocker and Statin  The patient will follow up with CV surgery for evaluation for CABG and [facility]. Cardiac rehab not ordered as patient will have CABG in the near future. DISPOSITION: The patient is being discharged home in stable condition on a low saturated fat, low cholesterol and low salt diet. The patient is instructed to advance activities as tolerated to the limit of fatigue or shortness of breath. The patient is instructed to avoid all heavy lifting, straining, stooping or squatting for 3-5 days. The patient is instructed to watch the cath site for bleeding/oozing; if seen, the patient is instructed to apply firm pressure with a clean cloth and call [facility] at [phone]. The patient is instructed to watch for signs of infection which include: increasing area of redness, fever/hot to touch or purulent drainage at the catheterization site. The patient is instructed not to soak in a bathtub for 7-10 days, but is cleared to shower. The patient is instructed to call the office or return to the ER for immediate evaluation for any shortness of breath or chest pain not relieved by NTG. Discharge Exam:   Visit Vitals  BP (!) 152/87 (BP 1 Location: Left arm, BP Patient Position: At rest)   Pulse 66   Temp 98.2 °F (36.8 °C)   Resp 20   Ht 5' 3\" (1.6 m)   Wt 81.6 kg (179 lb 14.4 oz)   SpO2 96%   BMI 31.87 kg/m²     Patient has been seen by Dr. Jonah Dominique: see his progress note for exam details.     Recent Results (from the past 24 hour(s))   PTT    Collection Time: 09/19/20 11:35 AM   Result Value Ref Range    aPTT 97.7 (H) 24.3 - 35.4 SEC   PTT    Collection Time: 09/20/20 12:04 AM   Result Value Ref Range    aPTT 78.6 (H) 24.3 - 79.5 SEC   METABOLIC PANEL, BASIC    Collection Time: 09/20/20  3:37 AM   Result Value Ref Range    Sodium 136 136 - 145 mmol/L Potassium 3.7 3.5 - 5.1 mmol/L    Chloride 106 98 - 107 mmol/L    CO2 21 21 - 32 mmol/L    Anion gap 9 7 - 16 mmol/L    Glucose 276 (H) 65 - 100 mg/dL    BUN 26 (H) 8 - 23 MG/DL    Creatinine 1.13 (H) 0.6 - 1.0 MG/DL    GFR est AA >60 >60 ml/min/1.73m2    GFR est non-AA 52 (L) >60 ml/min/1.73m2    Calcium 8.3 8.3 - 10.4 MG/DL   CBC W/O DIFF    Collection Time: 09/20/20  3:37 AM   Result Value Ref Range    WBC 9.0 4.3 - 11.1 K/uL    RBC 4.23 4.05 - 5.2 M/uL    HGB 12.5 11.7 - 15.4 g/dL    HCT 37.6 35.8 - 46.3 %    MCV 88.9 79.6 - 97.8 FL    MCH 29.6 26.1 - 32.9 PG    MCHC 33.2 31.4 - 35.0 g/dL    RDW 15.1 (H) 11.9 - 14.6 %    PLATELET 065 891 - 165 K/uL    MPV 11.0 9.4 - 12.3 FL    ABSOLUTE NRBC 0.00 0.0 - 0.2 K/uL         Patient Instructions:        Current Discharge Medication List      START taking these medications    Details   amiodarone (CORDARONE) 200 mg tablet Take 1 Tab by mouth two (2) times a day. Qty: 60 Tab, Refills: 11      aspirin 81 mg chewable tablet Take 1 Tab by mouth daily. Qty:        nitroglycerin (NITROSTAT) 0.4 mg SL tablet 1 Tab by SubLINGual route every five (5) minutes as needed for Chest Pain. Up to 3 doses. Qty: 1 Bottle, Refills: 5      metoprolol tartrate (LOPRESSOR) 25 mg tablet Take 1 Tab by mouth two (2) times a day. Qty: 60 Tab, Refills: 11      rivaroxaban (XARELTO) 20 mg tab tablet Take 1 Tab by mouth daily (with breakfast). Qty: 30 Tab, Refills: 11         CONTINUE these medications which have CHANGED    Details   lisinopriL (PRINIVIL, ZESTRIL) 20 mg tablet Take 1 Tab by mouth daily. Qty: 30 Tab, Refills: 11      rosuvastatin (CRESTOR) 40 mg tablet Take 1 Tab by mouth nightly. Qty: 30 Tab, Refills: 11         CONTINUE these medications which have NOT CHANGED    Details   furosemide (Lasix) 40 mg tablet Take 40 mg by mouth daily as needed.          STOP taking these medications       cloNIDine HCL (CATAPRES) 0.1 mg tablet Comments:   Reason for Stopping:         carvediloL (Coreg) 6.25 mg tablet Comments:   Reason for Stopping:         chlorthalidone (HYGROTEN) 25 mg tablet Comments:   Reason for Stopping:               Signed:  Terrell Noel NP  9/20/2020  9:07 AM

## 2020-09-21 ENCOUNTER — PATIENT OUTREACH (OUTPATIENT)
Dept: CASE MANAGEMENT | Age: 60
End: 2020-09-21

## 2020-09-21 LAB
ATRIAL RATE: 122 BPM
CALCULATED R AXIS, ECG10: 94 DEGREES
CALCULATED T AXIS, ECG11: 131 DEGREES
DIAGNOSIS, 93000: NORMAL
Q-T INTERVAL, ECG07: 314 MS
QRS DURATION, ECG06: 82 MS
QTC CALCULATION (BEZET), ECG08: 504 MS
VENTRICULAR RATE, ECG03: 155 BPM

## 2020-09-22 NOTE — PROGRESS NOTES
Patient contacted regarding recent visit for viral symptoms. This Charmaine Quevedo contacted the patient by telephone to perform post discharge call. Verified name and  with patient as identifiers. Provided introduction to self, and reason for call due to high risk of COVID-19. Discussed COVID-19 related testing which was not done at this time. Test results were not done. Patient informed of results, if available? Not done    Advance Care Planning:   Does patient have an Advance Directive: Reviewed and current      Discussed exposure protocols and quarantine with CDC Guidelines What To Do If You Are Sick    patient was given an opportunity for questions and concerns. Stay home except to get medical care  Separate yourself from other people and animals in your home  Call ahead before visiting your doctor  Wear a facemask  Cover your coughs and sneezes  Clean your hands often  Avoid sharing personal household items  Clean all high-touch surfaces everyday    Monitor your symptoms  Seek prompt medical attention if your illness is worsening (e.g., difficulty breathing). Before seeking care, call your healthcare provider and tell them that you have, or are being evaluated for, COVID-19. Put on a facemask before you enter the facility. These steps will help the healthcare provider's office to keep other people in the office or waiting room from getting infected or exposed. Ask your healthcare provider to call the local or [facility] health department. Persons who are placed under If you have a medical emergency and need to call 911, notify the dispatch personnel that you have, or are being evaluated for COVID-19. If possible, put on a facemask before emergency medical services arrive. The patient agrees to contact the [employer] exposure line [phone], local health department [facility] and PCP office for questions related to their healthcare.  Author provided contact information for future reference.     Patient/family/caregiver given information for [employer] and agrees to enroll No

## 2020-09-28 ENCOUNTER — HOSPITAL ENCOUNTER (OUTPATIENT)
Dept: SURGERY | Age: 60
Discharge: HOME | DRG: 228 | End: 2020-09-28
Payer: COMMERCIAL

## 2020-09-28 ENCOUNTER — ANESTHESIA EVENT (OUTPATIENT)
Dept: SURGERY | Age: 60
DRG: 228 | End: 2020-09-28
Payer: COMMERCIAL

## 2020-09-28 ENCOUNTER — HOSPITAL ENCOUNTER (OUTPATIENT)
Dept: ULTRASOUND IMAGING | Age: 60
Discharge: HOME | DRG: 228 | End: 2020-09-28
Attending: PHYSICIAN ASSISTANT
Payer: COMMERCIAL

## 2020-09-28 VITALS
BODY MASS INDEX: 32.14 KG/M2 | HEIGHT: 63 IN | WEIGHT: 181.4 LBS | RESPIRATION RATE: 18 BRPM | HEART RATE: 47 BPM | DIASTOLIC BLOOD PRESSURE: 85 MMHG | OXYGEN SATURATION: 98 % | SYSTOLIC BLOOD PRESSURE: 225 MMHG | TEMPERATURE: 97.1 F

## 2020-09-28 LAB
APPEARANCE UR: CLEAR
APTT PPP: 34.5 SEC (ref 24.3–35.4)
ATRIAL RATE: 47 BPM
BACTERIA SPEC CULT: NORMAL
BILIRUB UR QL: NEGATIVE
CALCULATED P AXIS, ECG09: 55 DEGREES
CALCULATED R AXIS, ECG10: 71 DEGREES
CALCULATED T AXIS, ECG11: 136 DEGREES
COLOR UR: YELLOW
DIAGNOSIS, 93000: NORMAL
EST. AVERAGE GLUCOSE BLD GHB EST-MCNC: 189 MG/DL
GLUCOSE BLD STRIP.AUTO-MCNC: 123 MG/DL (ref 65–100)
GLUCOSE UR STRIP.AUTO-MCNC: NEGATIVE MG/DL
HBA1C MFR BLD: 8.2 % (ref 4.8–6)
HGB UR QL STRIP: NEGATIVE
HISTORY CHECKED?,CKHIST: NORMAL
INR PPP: 1
KETONES UR QL STRIP.AUTO: NEGATIVE MG/DL
LEUKOCYTE ESTERASE UR QL STRIP.AUTO: NEGATIVE
MAGNESIUM SERPL-MCNC: 2.2 MG/DL (ref 1.8–2.4)
NITRITE UR QL STRIP.AUTO: NEGATIVE
P-R INTERVAL, ECG05: 150 MS
PH UR STRIP: 7 [PH] (ref 5–9)
PROT UR STRIP-MCNC: NEGATIVE MG/DL
PROTHROMBIN TIME: 13.5 SEC (ref 12–14.7)
Q-T INTERVAL, ECG07: 518 MS
QRS DURATION, ECG06: 82 MS
QTC CALCULATION (BEZET), ECG08: 458 MS
SERVICE CMNT-IMP: NORMAL
SP GR UR REFRACTOMETRY: 1.01 (ref 1–1.02)
UROBILINOGEN UR QL STRIP.AUTO: 0.2 EU/DL (ref 0.2–1)
VENTRICULAR RATE, ECG03: 47 BPM

## 2020-09-28 PROCEDURE — 86900 BLOOD TYPING SEROLOGIC ABO: CPT

## 2020-09-28 PROCEDURE — 93005 ELECTROCARDIOGRAM TRACING: CPT | Performed by: PHYSICIAN ASSISTANT

## 2020-09-28 PROCEDURE — 82962 GLUCOSE BLOOD TEST: CPT

## 2020-09-28 PROCEDURE — 83735 ASSAY OF MAGNESIUM: CPT

## 2020-09-28 PROCEDURE — 87641 MR-STAPH DNA AMP PROBE: CPT

## 2020-09-28 PROCEDURE — 83036 HEMOGLOBIN GLYCOSYLATED A1C: CPT

## 2020-09-28 PROCEDURE — 81003 URINALYSIS AUTO W/O SCOPE: CPT

## 2020-09-28 PROCEDURE — 94010 BREATHING CAPACITY TEST: CPT

## 2020-09-28 PROCEDURE — 85610 PROTHROMBIN TIME: CPT

## 2020-09-28 PROCEDURE — 87635 SARS-COV-2 COVID-19 AMP PRB: CPT

## 2020-09-28 PROCEDURE — 86920 COMPATIBILITY TEST SPIN: CPT

## 2020-09-28 PROCEDURE — 85730 THROMBOPLASTIN TIME PARTIAL: CPT

## 2020-09-28 PROCEDURE — 93880 EXTRACRANIAL BILAT STUDY: CPT

## 2020-09-28 PROCEDURE — 36415 COLL VENOUS BLD VENIPUNCTURE: CPT

## 2020-09-28 NOTE — ANESTHESIA PREPROCEDURE EVALUATION
Relevant Problems   No relevant active problems       Anesthetic History   No history of anesthetic complications            Review of Systems / Medical History  Patient summary reviewed, nursing notes reviewed and pertinent labs reviewed    Pulmonary        Sleep apnea: CPAP           Neuro/Psych   Within defined limits           Cardiovascular    Hypertension: well controlled        Dysrhythmias (PAF) : atrial fibrillation  CAD (Multivessel obstructive CAD w preserved LV function) and hyperlipidemia    Exercise tolerance: <4 METS     GI/Hepatic/Renal  Within defined limits              Endo/Other  Within defined limits           Other Findings            Physical Exam    Airway  Mallampati: I  TM Distance: 4 - 6 cm  Neck ROM: normal range of motion   Mouth opening: Normal     Cardiovascular  Regular rate and rhythm,  S1 and S2 normal,  no murmur, click, rub, or gallop             Dental  No notable dental hx       Pulmonary  Breath sounds clear to auscultation               Abdominal  GI exam deferred       Other Findings            Anesthetic Plan    ASA: 4  Anesthesia type: general    Monitoring Plan: BIS, Arterial line, CVP and MILTON    Post procedure ventilation     Anesthetic plan and risks discussed with: Patient and Son / Daughter      Plan flow trac and no Odell Bless

## 2020-09-28 NOTE — PERIOP NOTES
How to Use Your Incentive Spirometer       About Your Incentive Spirometer  An incentive spirometer is a device that will expand your lungs by helping you to breathe more deeply and fully. The parts of your incentive spirometer are labeled in Figure 1. Using your incentive spirometer  When youre using your incentive spirometer, make sure to breathe through your mouth. If you breathe through your nose, the incentive spirometer wont work properly. You can hold your nose if you have trouble. DO NOT BLOW INTO THE DEVICE. If you feel dizzy at any time, stop and rest. Try again at a later time. 1. Sit upright in a chair or in bed. Hold the incentive spirometer at eye level. 2. Put the mouthpiece in your mouth and close your lips tightly around it. Slowly breathe out (exhale) completely. 3. Breathe in (inhale) slowly through your mouth as deeply as you can. As you take the breath, you will see the piston rise inside the large column. While the piston rises, the indicator on the right should move upwards. It should stay in between the 2 arrows (see Figure 1). 4. Try to get the piston as high as you can, while keeping the indicator between the arrows. If the indicator doesnt stay between the arrows, youre breathing either too fast or too slow. 5. When you get it as high as you can, hold your breath for 10 seconds, or as long as possible. While youre holding your breath, the piston will slowly fall to the base of the spirometer. 6. Once the piston reaches the bottom of the spirometer, breathe out slowly through your mouth. Rest for a few seconds. 7. Repeat 10 times. Try to get the piston to the same level with each breath. 8. After each set of 10 breaths, try to cough as coughing will help loosen or clear any mucus in your lungs. 9. Put the marker at the level the piston reached on your incentive spirometer. This will be your goal next time. Repeat these steps every hour that youre awake.   Cover the mouthpiece of the incentive spirometer when you arent using it        Copy provided to patient

## 2020-09-28 NOTE — PERIOP NOTES
PLEASE CONTINUE TAKING ALL PRESCRIPTION MEDICATIONS UP TO THE DAY OF SURGERY UNLESS OTHERWISE DIRECTED BELOW. DISCONTINUE all vitamins and supplements now. DISCONTINUE Non-Steriodal Anti-Inflammatory (NSAIDS) such as Advil and Aleve 5 days prior to surgery. Home Medications to take  the day of surgery   Amiodarone (Cordarone)  Aspirin 81 mg   Metoprolol (Lopressor)               Home Medications   to Hold   Vitamins and supplements. Xarelto- continue to follow surgeon/ cardiologists instructions for holding        Comments    ** Bring nitroglycerin, Incentive Spirometer (IS) and CPAP to hospital morning of procedure. ** Do not remove green bracelet placed at today's appointment. This must stay on until discharged from hospital after your surgery**        *Visitor policy of 1 visitor per patient discussed. Please do not bring home medications with you on the day of surgery unless otherwise directed by your nurse. If you are instructed to bring home medications, please give them to your nurse as they will be administered by the nursing staff. If you have any questions, please call [facility] [phone]. A copy of this note was provided to the patient for reference.

## 2020-09-28 NOTE — PERIOP NOTES
Patient confirms name and . Order to obtain consent  found in EHR and matches case posting. Rapid covid test  completed. Result  noted in EMR \" not detected\". Patient verbalizes understanding of 1 visitor policy and current visiting hour restrictions. Labs/Orders per Corazon Rodriguez in to assess patient per anesthesia protocol. All cardiac records reviewed. ECHO, EKG, cath report, chest Xray  (20)  EKG 20. No orders given. Rx bottles visualized today. Medication instructions provided as listed on PTA medication instruction handout (see RN note). Reviewed handout with patient and provided copy to take home. Patient verbalizes understanding of all instructions. Patient viewed preoperative heart teaching video. Pre op instructions and education sheets given and reviewed with patient:  Heart instructions, central line catheter infection prevention, ventilator education,  blood transfusion education, hand hygiene education, smoking cessation education, pain management education. Patient verbalizes understanding of all instructions. Patient given incentive spirometer with verbal instructions. Patient seen by respiratory therapist, and  FEV1 results on chart. Pt instructed on importance of handwashing for infection prevention. Pt verbalizes understanding to shower nightly with antibacterial soap & Hibiclens provided at pre assessment on the night prior to and morning of surgery. Instructed to turn water off and wash with HIBICLENS from chin to toes avoiding genitalia, then rinse after 1 minute. Pt verbalizes understanding to repeat this the morning of surgery. Pt verbalizes understanding to CONTINUE ALL OTHER MEDICATIONS AS PRESCRIBED UNTIL DAY OF SURGERY unless instructed differently per instruction sheet below (provided copy to patient). Prescriptions called to patient's pharmacy: None- patient already on amiodarone and metoprolol.  Per [facility] no load needed and may take normal doses night before and morning of procedure. Patient given written and verbal instructions to obtain and instructions for use. MRSA swab and clean catch urine obtained and sent to lab. Patient ID armband placed on patient's arm, and patient given copy of order for carotid ultrasound. Patient verbalizes understanding to proceed to radiology after labs are drawn to have both CXR and carotid ultrasound completed before leaving the hospital today. Labs drawn by Dulce in lab including blood bank labs. [employer] blood bank wrist band placed on the patient's right wrist and pt verbalizes understanding not to remove the band until discharged from the hospital after surgery. Patient verbalizes understanding that arrival time will be called to patient on the weekday prior to surgery date and that patient must check phone messages. If patient has any questions regarding arrival times call [phone]. PT. INSTRUCTED TO CALL THE FOLLOWING NUMBERS IF ANY SAFETY CONCERNS BEFORE, DURING, OR AFTER HOSPITAL ENCOUNTER: PT. SAFETY LINE - [phone] OR PT. RELATIONS - [phone].         \

## 2020-09-28 NOTE — PERIOP NOTES
[place], [address] notified patient's /85 at PAT appointment and HR 47 Sinus Deny. Orders given for patient to take half current dose morning of procedure. Adjusted dose 12.5 mg. Patient contacted and verbalizes understanding.

## 2020-09-28 NOTE — PERIOP NOTES
Recent Results (from the past 12 hour(s))   SARS-COV-2    Collection Time: 09/28/20  8:14 AM   Result Value Ref Range    Specimen source Nasopharyngeal      COVID-19 rapid test Not detected NOTD      SARS CoV-2 PENDING    MSSA/MRSA SC BY PCR, NASAL SWAB    Collection Time: 09/28/20  8:16 AM    Specimen: Swab   Result Value Ref Range    Special Requests: NO SPECIAL REQUESTS      Culture result:        SA target not detected. A MRSA NEGATIVE, SA NEGATIVE test result does not preclude MRSA or SA nasal colonization. URINALYSIS W/ RFLX MICROSCOPIC    Collection Time: 09/28/20  8:16 AM   Result Value Ref Range    Color YELLOW      Appearance CLEAR      Specific gravity 1.008 1.001 - 1.023      pH (UA) 7.0 5.0 - 9.0      Protein Negative NEG mg/dL    Glucose Negative mg/dL    Ketone Negative NEG mg/dL    Bilirubin Negative NEG      Blood Negative NEG      Urobilinogen 0.2 0.2 - 1.0 EU/dL    Nitrites Negative NEG      Leukocyte Esterase Negative NEG     GLUCOSE, POC    Collection Time: 09/28/20 10:24 AM   Result Value Ref Range    Glucose (POC) 123 (H) 65 - 100 mg/dL   TYPE & SCREEN    Collection Time: 09/28/20 10:55 AM   Result Value Ref Range    Crossmatch Expiration 10/01/2020     ABO/Rh(D) Leva Pean POSITIVE     Antibody screen NEG    PROTHROMBIN TIME + INR    Collection Time: 09/28/20 10:55 AM   Result Value Ref Range    Prothrombin time 13.5 12.0 - 14.7 sec    INR 1.0     PTT    Collection Time: 09/28/20 10:55 AM   Result Value Ref Range    aPTT 34.5 24.3 - 35.4 SEC   HEMOGLOBIN A1C WITH EAG    Collection Time: 09/28/20 10:55 AM   Result Value Ref Range    Hemoglobin A1c 8.2 (H) 4.8 - 6.0 %    Est. average glucose 189 mg/dL   MAGNESIUM    Collection Time: 09/28/20 10:55 AM   Result Value Ref Range    Magnesium 2.2 1.8 - 2.4 mg/dL     Labs, UA, and carotid US reviewed. Lewis Tai, [address] informed of A1C. Other labs WDL of anesthesia protocol.

## 2020-09-29 ENCOUNTER — APPOINTMENT (OUTPATIENT)
Dept: GENERAL RADIOLOGY | Age: 60
DRG: 228 | End: 2020-09-29
Attending: THORACIC SURGERY (CARDIOTHORACIC VASCULAR SURGERY)
Payer: COMMERCIAL

## 2020-09-29 ENCOUNTER — HOSPITAL ENCOUNTER (INPATIENT)
Age: 60
LOS: 13 days | Discharge: HOME HEALTH | DRG: 228 | End: 2020-10-12
Attending: THORACIC SURGERY (CARDIOTHORACIC VASCULAR SURGERY) | Admitting: THORACIC SURGERY (CARDIOTHORACIC VASCULAR SURGERY)
Payer: COMMERCIAL

## 2020-09-29 ENCOUNTER — ANESTHESIA (OUTPATIENT)
Dept: SURGERY | Age: 60
DRG: 228 | End: 2020-09-29
Payer: COMMERCIAL

## 2020-09-29 DIAGNOSIS — I48.0 PAROXYSMAL ATRIAL FIBRILLATION (HCC): ICD-10-CM

## 2020-09-29 DIAGNOSIS — I95.9 HYPOTENSION, UNSPECIFIED HYPOTENSION TYPE: ICD-10-CM

## 2020-09-29 DIAGNOSIS — Z99.11 ENCOUNTER FOR WEANING FROM VENTILATOR (HCC): ICD-10-CM

## 2020-09-29 DIAGNOSIS — I48.0 PAROXYSMAL A-FIB (HCC): ICD-10-CM

## 2020-09-29 DIAGNOSIS — I10 ESSENTIAL HYPERTENSION: ICD-10-CM

## 2020-09-29 DIAGNOSIS — F41.9 ANXIETY: ICD-10-CM

## 2020-09-29 DIAGNOSIS — R91.8 BILATERAL PULMONARY INFILTRATES ON CHEST X-RAY: ICD-10-CM

## 2020-09-29 DIAGNOSIS — Z95.1 S/P CABG X 4: ICD-10-CM

## 2020-09-29 DIAGNOSIS — G47.33 OSA ON CPAP: ICD-10-CM

## 2020-09-29 DIAGNOSIS — Z99.89 OSA ON CPAP: ICD-10-CM

## 2020-09-29 DIAGNOSIS — R09.02 HYPOXEMIA: ICD-10-CM

## 2020-09-29 DIAGNOSIS — R05.9 COUGH: ICD-10-CM

## 2020-09-29 DIAGNOSIS — N17.9 AKI (ACUTE KIDNEY INJURY) (HCC): ICD-10-CM

## 2020-09-29 DIAGNOSIS — J96.01 ACUTE RESPIRATORY FAILURE WITH HYPOXIA (HCC): ICD-10-CM

## 2020-09-29 DIAGNOSIS — J81.0 ACUTE PULMONARY EDEMA (HCC): ICD-10-CM

## 2020-09-29 DIAGNOSIS — I25.110 ATHEROSCLEROSIS OF NATIVE CORONARY ARTERY OF NATIVE HEART WITH UNSTABLE ANGINA PECTORIS (HCC): ICD-10-CM

## 2020-09-29 DIAGNOSIS — R29.818 SUSPECTED SLEEP APNEA: ICD-10-CM

## 2020-09-29 PROBLEM — I25.10 CAD (CORONARY ARTERY DISEASE): Status: ACTIVE | Noted: 2020-09-29

## 2020-09-29 LAB
ANION GAP SERPL CALC-SCNC: 7 MMOL/L (ref 7–16)
APTT PPP: 40 SEC (ref 24.3–35.4)
ARTERIAL PATENCY WRIST A: ABNORMAL
ARTERIAL PATENCY WRIST A: NORMAL
ATRIAL RATE: 63 BPM
BASE DEFICIT BLD-SCNC: 1 MMOL/L
BASE DEFICIT BLD-SCNC: 2 MMOL/L
BASE DEFICIT BLD-SCNC: 5 MMOL/L
BASE DEFICIT BLD-SCNC: 5 MMOL/L
BASE EXCESS BLD CALC-SCNC: 1 MMOL/L
BDY SITE: ABNORMAL
BDY SITE: NORMAL
BUN SERPL-MCNC: 15 MG/DL (ref 8–23)
CA-I BLD-MCNC: 0.96 MMOL/L (ref 1.12–1.32)
CA-I BLD-MCNC: 0.98 MMOL/L (ref 1.12–1.32)
CA-I BLD-MCNC: 0.99 MMOL/L (ref 1.12–1.32)
CA-I BLD-MCNC: 1.01 MMOL/L (ref 1.12–1.32)
CA-I BLD-MCNC: 1.04 MMOL/L (ref 1.12–1.32)
CA-I BLD-MCNC: 1.16 MMOL/L (ref 1.12–1.32)
CA-I BLD-MCNC: 1.17 MMOL/L (ref 1.12–1.32)
CA-I BLD-MCNC: 1.19 MMOL/L (ref 1.12–1.32)
CA-I BLD-MCNC: 1.21 MMOL/L (ref 1.12–1.32)
CALCIUM SERPL-MCNC: 7.7 MG/DL (ref 8.3–10.4)
CALCULATED R AXIS, ECG10: -98 DEGREES
CALCULATED T AXIS, ECG11: 117 DEGREES
CHLORIDE SERPL-SCNC: 113 MMOL/L (ref 98–107)
CO2 BLD-SCNC: 25 MMOL/L
CO2 SERPL-SCNC: 26 MMOL/L (ref 21–32)
COLLECT TIME,HTIME: 1322
COLLECT TIME,HTIME: 1730
COVID-19 RAPID TEST, COVR: NOT DETECTED
CREAT SERPL-MCNC: 1.11 MG/DL (ref 0.6–1)
DIAGNOSIS, 93000: NORMAL
ERYTHROCYTE [DISTWIDTH] IN BLOOD BY AUTOMATED COUNT: 15.7 % (ref 11.9–14.6)
EXHALED MINUTE VOLUME, VE: 6.2 L/MIN
EXHALED MINUTE VOLUME, VE: 6.2 L/MIN
FIBRINOGEN PPP-MCNC: 188 MG/DL (ref 190–501)
GAS FLOW.O2 O2 DELIVERY SYS: ABNORMAL L/MIN
GAS FLOW.O2 O2 DELIVERY SYS: NORMAL L/MIN
GAS FLOW.O2 SETTING OXYMISER: 14 BPM
GLUCOSE BLD STRIP.AUTO-MCNC: 105 MG/DL (ref 65–100)
GLUCOSE BLD STRIP.AUTO-MCNC: 111 MG/DL (ref 65–100)
GLUCOSE BLD STRIP.AUTO-MCNC: 117 MG/DL (ref 65–100)
GLUCOSE BLD STRIP.AUTO-MCNC: 117 MG/DL (ref 65–100)
GLUCOSE BLD STRIP.AUTO-MCNC: 118 MG/DL (ref 65–100)
GLUCOSE BLD STRIP.AUTO-MCNC: 124 MG/DL (ref 65–100)
GLUCOSE BLD STRIP.AUTO-MCNC: 126 MG/DL (ref 65–100)
GLUCOSE BLD STRIP.AUTO-MCNC: 131 MG/DL (ref 65–100)
GLUCOSE BLD STRIP.AUTO-MCNC: 132 MG/DL (ref 65–100)
GLUCOSE BLD STRIP.AUTO-MCNC: 133 MG/DL (ref 65–100)
GLUCOSE BLD STRIP.AUTO-MCNC: 137 MG/DL (ref 65–100)
GLUCOSE BLD STRIP.AUTO-MCNC: 142 MG/DL (ref 65–100)
GLUCOSE BLD STRIP.AUTO-MCNC: 142 MG/DL (ref 65–100)
GLUCOSE BLD STRIP.AUTO-MCNC: 146 MG/DL (ref 65–100)
GLUCOSE BLD STRIP.AUTO-MCNC: 147 MG/DL (ref 65–100)
GLUCOSE BLD STRIP.AUTO-MCNC: 149 MG/DL (ref 65–100)
GLUCOSE BLD STRIP.AUTO-MCNC: 155 MG/DL (ref 65–100)
GLUCOSE BLD STRIP.AUTO-MCNC: 158 MG/DL (ref 65–100)
GLUCOSE BLD STRIP.AUTO-MCNC: 159 MG/DL (ref 65–100)
GLUCOSE BLD STRIP.AUTO-MCNC: 168 MG/DL (ref 65–100)
GLUCOSE BLD STRIP.AUTO-MCNC: 99 MG/DL (ref 65–100)
GLUCOSE SERPL-MCNC: 99 MG/DL (ref 65–100)
HCO3 BLD-SCNC: 21.6 MMOL/L (ref 22–26)
HCO3 BLD-SCNC: 22 MMOL/L (ref 22–26)
HCO3 BLD-SCNC: 23.4 MMOL/L (ref 22–26)
HCO3 BLD-SCNC: 23.5 MMOL/L (ref 22–26)
HCO3 BLD-SCNC: 23.6 MMOL/L (ref 22–26)
HCO3 BLD-SCNC: 24.1 MMOL/L (ref 22–26)
HCO3 BLD-SCNC: 24.6 MMOL/L (ref 22–26)
HCO3 BLD-SCNC: 24.8 MMOL/L (ref 22–26)
HCO3 BLD-SCNC: 25 MMOL/L (ref 22–26)
HCO3 BLD-SCNC: 25.3 MMOL/L (ref 22–26)
HCT VFR BLD AUTO: 35.8 % (ref 35.8–46.3)
HCT VFR BLD AUTO: 39.4 % (ref 35.8–46.3)
HCT VFR BLD AUTO: 41 % (ref 35.8–46.3)
HGB BLD-MCNC: 11.9 G/DL (ref 11.7–15.4)
HGB BLD-MCNC: 12.5 G/DL (ref 11.7–15.4)
HGB BLD-MCNC: 13 G/DL (ref 11.7–15.4)
INR PPP: 1.3
MAGNESIUM SERPL-MCNC: 2.5 MG/DL (ref 1.8–2.4)
MAGNESIUM SERPL-MCNC: 2.9 MG/DL (ref 1.8–2.4)
MAGNESIUM SERPL-MCNC: 3.7 MG/DL (ref 1.8–2.4)
MCH RBC QN AUTO: 29.8 PG (ref 26.1–32.9)
MCHC RBC AUTO-ENTMCNC: 33.2 G/DL (ref 31.4–35)
MCV RBC AUTO: 89.7 FL (ref 79.6–97.8)
NRBC # BLD: 0 K/UL (ref 0–0.2)
O2/TOTAL GAS SETTING VFR VENT: 100 %
O2/TOTAL GAS SETTING VFR VENT: 40 %
PCO2 BLD: 38.5 MMHG (ref 35–45)
PCO2 BLD: 39.6 MMHG (ref 35–45)
PCO2 BLD: 41.6 MMHG (ref 35–45)
PCO2 BLD: 44.1 MMHG (ref 35–45)
PCO2 BLD: 44.3 MMHG (ref 35–45)
PCO2 BLD: 45.2 MMHG (ref 35–45)
PCO2 BLD: 47.3 MMHG (ref 35–45)
PCO2 BLD: 47.5 MMHG (ref 35–45)
PCO2 BLD: 49.2 MMHG (ref 35–45)
PCO2 BLD: 51.2 MMHG (ref 35–45)
PEEP RESPIRATORY: 8 CMH2O
PEEP RESPIRATORY: 8 CMH2O
PH BLD: 7.27 [PH] (ref 7.35–7.45)
PH BLD: 7.28 [PH] (ref 7.35–7.45)
PH BLD: 7.3 [PH] (ref 7.35–7.45)
PH BLD: 7.31 [PH] (ref 7.35–7.45)
PH BLD: 7.34 [PH] (ref 7.35–7.45)
PH BLD: 7.35 [PH] (ref 7.35–7.45)
PH BLD: 7.35 [PH] (ref 7.35–7.45)
PH BLD: 7.36 [PH] (ref 7.35–7.45)
PH BLD: 7.38 [PH] (ref 7.35–7.45)
PH BLD: 7.42 [PH] (ref 7.35–7.45)
PLATELET # BLD AUTO: 147 K/UL (ref 150–450)
PMV BLD AUTO: 11.2 FL (ref 9.4–12.3)
PO2 BLD: 137 MMHG (ref 75–100)
PO2 BLD: 220 MMHG (ref 75–100)
PO2 BLD: 233 MMHG (ref 75–100)
PO2 BLD: 241 MMHG (ref 75–100)
PO2 BLD: 281 MMHG (ref 75–100)
PO2 BLD: 315 MMHG (ref 75–100)
PO2 BLD: 43 MMHG (ref 75–100)
PO2 BLD: 491 MMHG (ref 75–100)
PO2 BLD: 80 MMHG (ref 75–100)
PO2 BLD: 90 MMHG (ref 75–100)
POTASSIUM BLD-SCNC: 3.8 MMOL/L (ref 3.5–5.1)
POTASSIUM BLD-SCNC: 4 MMOL/L (ref 3.5–5.1)
POTASSIUM BLD-SCNC: 4.3 MMOL/L (ref 3.5–5.1)
POTASSIUM BLD-SCNC: 4.8 MMOL/L (ref 3.5–5.1)
POTASSIUM BLD-SCNC: 4.9 MMOL/L (ref 3.5–5.1)
POTASSIUM BLD-SCNC: 4.9 MMOL/L (ref 3.5–5.1)
POTASSIUM BLD-SCNC: 5 MMOL/L (ref 3.5–5.1)
POTASSIUM SERPL-SCNC: 4.1 MMOL/L (ref 3.5–5.1)
POTASSIUM SERPL-SCNC: 4.1 MMOL/L (ref 3.5–5.1)
POTASSIUM SERPL-SCNC: 4.2 MMOL/L (ref 3.5–5.1)
PRESSURE SUPPORT SETTING VENT: 10 CMH2O
PRESSURE SUPPORT SETTING VENT: 14 CMH2O
PROTHROMBIN TIME: 16.6 SEC (ref 12–14.7)
Q-T INTERVAL, ECG07: 512 MS
QRS DURATION, ECG06: 188 MS
QTC CALCULATION (BEZET), ECG08: 598 MS
RBC # BLD AUTO: 3.99 M/UL (ref 4.05–5.2)
SAO2 % BLD: 100 % (ref 95–98)
SAO2 % BLD: 72 % (ref 95–98)
SAO2 % BLD: 95 % (ref 95–98)
SAO2 % BLD: 96 % (ref 95–98)
SAO2 % BLD: 99 % (ref 95–98)
SARS COV-2, XPGCVT: NEGATIVE
SERVICE CMNT-IMP: ABNORMAL
SERVICE CMNT-IMP: NORMAL
SODIUM BLD-SCNC: 141 MMOL/L (ref 136–145)
SODIUM BLD-SCNC: 142 MMOL/L (ref 136–145)
SODIUM BLD-SCNC: 143 MMOL/L (ref 136–145)
SODIUM BLD-SCNC: 144 MMOL/L (ref 136–145)
SODIUM SERPL-SCNC: 146 MMOL/L (ref 136–145)
SOURCE, COVRS: NORMAL
SPECIMEN TYPE: ABNORMAL
SPECIMEN TYPE: NORMAL
TOTAL RESP. RATE, ITRR: 16
VENTILATION MODE VENT: ABNORMAL
VENTILATION MODE VENT: NORMAL
VENTRICULAR RATE, ECG03: 82 BPM
VT SETTING VENT: 450 ML
WBC # BLD AUTO: 12.6 K/UL (ref 4.3–11.1)

## 2020-09-29 PROCEDURE — 77030019908 HC STETH ESOPH SIMS -A: Performed by: NURSE ANESTHETIST, CERTIFIED REGISTERED

## 2020-09-29 PROCEDURE — 77030013861 HC PNCH AORT CLNCUT QUES -B: Performed by: THORACIC SURGERY (CARDIOTHORACIC VASCULAR SURGERY)

## 2020-09-29 PROCEDURE — 02580ZZ DESTRUCTION OF CONDUCTION MECHANISM, OPEN APPROACH: ICD-10-PCS | Performed by: THORACIC SURGERY (CARDIOTHORACIC VASCULAR SURGERY)

## 2020-09-29 PROCEDURE — 76010000219 HC CV SURG 6 TO 6.5 HR INTENSV-TIER 1: Performed by: THORACIC SURGERY (CARDIOTHORACIC VASCULAR SURGERY)

## 2020-09-29 PROCEDURE — 021209W BYPASS CORONARY ARTERY, THREE ARTERIES FROM AORTA WITH AUTOLOGOUS VENOUS TISSUE, OPEN APPROACH: ICD-10-PCS | Performed by: THORACIC SURGERY (CARDIOTHORACIC VASCULAR SURGERY)

## 2020-09-29 PROCEDURE — 77030002912 HC SUT ETHBND J&J -A: Performed by: THORACIC SURGERY (CARDIOTHORACIC VASCULAR SURGERY)

## 2020-09-29 PROCEDURE — 86580 TB INTRADERMAL TEST: CPT | Performed by: THORACIC SURGERY (CARDIOTHORACIC VASCULAR SURGERY)

## 2020-09-29 PROCEDURE — 77030002996 HC SUT SLK J&J -A: Performed by: THORACIC SURGERY (CARDIOTHORACIC VASCULAR SURGERY)

## 2020-09-29 PROCEDURE — 77030020407 HC IV BLD WRMR ST 3M -A: Performed by: NURSE ANESTHETIST, CERTIFIED REGISTERED

## 2020-09-29 PROCEDURE — C1769 GUIDE WIRE: HCPCS | Performed by: THORACIC SURGERY (CARDIOTHORACIC VASCULAR SURGERY)

## 2020-09-29 PROCEDURE — 85610 PROTHROMBIN TIME: CPT

## 2020-09-29 PROCEDURE — 77030005518 HC CATH URETH FOL 2W BARD -B: Performed by: THORACIC SURGERY (CARDIOTHORACIC VASCULAR SURGERY)

## 2020-09-29 PROCEDURE — 77030037088 HC TUBE ENDOTRACH ORAL NSL COVD-A: Performed by: NURSE ANESTHETIST, CERTIFIED REGISTERED

## 2020-09-29 PROCEDURE — C1751 CATH, INF, PER/CENT/MIDLINE: HCPCS | Performed by: NURSE ANESTHETIST, CERTIFIED REGISTERED

## 2020-09-29 PROCEDURE — 74011000250 HC RX REV CODE- 250

## 2020-09-29 PROCEDURE — 77030012390 HC DRN CHST BTL GTNG -B: Performed by: THORACIC SURGERY (CARDIOTHORACIC VASCULAR SURGERY)

## 2020-09-29 PROCEDURE — 74011000258 HC RX REV CODE- 258: Performed by: NURSE ANESTHETIST, CERTIFIED REGISTERED

## 2020-09-29 PROCEDURE — 2709999900 HC NON-CHARGEABLE SUPPLY: Performed by: THORACIC SURGERY (CARDIOTHORACIC VASCULAR SURGERY)

## 2020-09-29 PROCEDURE — 77030018548 HC SUT ETHBND2 J&J -B: Performed by: THORACIC SURGERY (CARDIOTHORACIC VASCULAR SURGERY)

## 2020-09-29 PROCEDURE — 74011636637 HC RX REV CODE- 636/637: Performed by: NURSE ANESTHETIST, CERTIFIED REGISTERED

## 2020-09-29 PROCEDURE — 77030034927 HC PK PROC CPB INSPIRE PERF LIVA -F: Performed by: THORACIC SURGERY (CARDIOTHORACIC VASCULAR SURGERY)

## 2020-09-29 PROCEDURE — 77030041933 HC CLP ATRI LAA EXCL FLX ATRC -H: Performed by: THORACIC SURGERY (CARDIOTHORACIC VASCULAR SURGERY)

## 2020-09-29 PROCEDURE — 76060000043 HC ANESTHESIA 6 TO 6.5 HR: Performed by: THORACIC SURGERY (CARDIOTHORACIC VASCULAR SURGERY)

## 2020-09-29 PROCEDURE — 2709999900 HC NON-CHARGEABLE SUPPLY

## 2020-09-29 PROCEDURE — 85730 THROMBOPLASTIN TIME PARTIAL: CPT

## 2020-09-29 PROCEDURE — 77030013292 HC BOWL MX PRSM J&J -A: Performed by: NURSE ANESTHETIST, CERTIFIED REGISTERED

## 2020-09-29 PROCEDURE — 77030010938 HC CLP LIG TELE -A: Performed by: THORACIC SURGERY (CARDIOTHORACIC VASCULAR SURGERY)

## 2020-09-29 PROCEDURE — 77030006690 HC BLD OPHTH BVR BD -B: Performed by: THORACIC SURGERY (CARDIOTHORACIC VASCULAR SURGERY)

## 2020-09-29 PROCEDURE — 77030030163 HC BN WAX J&J -A: Performed by: THORACIC SURGERY (CARDIOTHORACIC VASCULAR SURGERY)

## 2020-09-29 PROCEDURE — 77030005401 HC CATH RAD ARRO -A: Performed by: NURSE ANESTHETIST, CERTIFIED REGISTERED

## 2020-09-29 PROCEDURE — 99223 1ST HOSP IP/OBS HIGH 75: CPT | Performed by: INTERNAL MEDICINE

## 2020-09-29 PROCEDURE — 74011000250 HC RX REV CODE- 250: Performed by: THORACIC SURGERY (CARDIOTHORACIC VASCULAR SURGERY)

## 2020-09-29 PROCEDURE — 82962 GLUCOSE BLOOD TEST: CPT

## 2020-09-29 PROCEDURE — 74011250637 HC RX REV CODE- 250/637: Performed by: THORACIC SURGERY (CARDIOTHORACIC VASCULAR SURGERY)

## 2020-09-29 PROCEDURE — 0210099 BYPASS CORONARY ARTERY, ONE ARTERY FROM LEFT INTERNAL MAMMARY WITH AUTOLOGOUS VENOUS TISSUE, OPEN APPROACH: ICD-10-PCS | Performed by: THORACIC SURGERY (CARDIOTHORACIC VASCULAR SURGERY)

## 2020-09-29 PROCEDURE — 77030025646 HC AUTOTRNSFUS KT TERU -C: Performed by: THORACIC SURGERY (CARDIOTHORACIC VASCULAR SURGERY)

## 2020-09-29 PROCEDURE — 77030002986 HC SUT PROL J&J -A: Performed by: THORACIC SURGERY (CARDIOTHORACIC VASCULAR SURGERY)

## 2020-09-29 PROCEDURE — 77030002987 HC SUT PROL J&J -B: Performed by: THORACIC SURGERY (CARDIOTHORACIC VASCULAR SURGERY)

## 2020-09-29 PROCEDURE — B24BZZZ ULTRASONOGRAPHY OF HEART WITH AORTA: ICD-10-PCS | Performed by: THORACIC SURGERY (CARDIOTHORACIC VASCULAR SURGERY)

## 2020-09-29 PROCEDURE — 74011000258 HC RX REV CODE- 258

## 2020-09-29 PROCEDURE — 77030039425 HC BLD LARYNG TRULITE DISP TELE -A: Performed by: NURSE ANESTHETIST, CERTIFIED REGISTERED

## 2020-09-29 PROCEDURE — 74011250636 HC RX REV CODE- 250/636

## 2020-09-29 PROCEDURE — 82803 BLOOD GASES ANY COMBINATION: CPT

## 2020-09-29 PROCEDURE — 36600 WITHDRAWAL OF ARTERIAL BLOOD: CPT

## 2020-09-29 PROCEDURE — P9016 RBC LEUKOCYTES REDUCED: HCPCS

## 2020-09-29 PROCEDURE — 74011250636 HC RX REV CODE- 250/636: Performed by: THORACIC SURGERY (CARDIOTHORACIC VASCULAR SURGERY)

## 2020-09-29 PROCEDURE — 80048 BASIC METABOLIC PNL TOTAL CA: CPT

## 2020-09-29 PROCEDURE — 5A1223Z PERFORMANCE OF CARDIAC PACING, CONTINUOUS: ICD-10-PCS | Performed by: THORACIC SURGERY (CARDIOTHORACIC VASCULAR SURGERY)

## 2020-09-29 PROCEDURE — 77030009355 HC CRDPLG DEL SET QUES -C: Performed by: THORACIC SURGERY (CARDIOTHORACIC VASCULAR SURGERY)

## 2020-09-29 PROCEDURE — 77030002520 HC INSRT CLMP LATIS STLTH AMR -B: Performed by: THORACIC SURGERY (CARDIOTHORACIC VASCULAR SURGERY)

## 2020-09-29 PROCEDURE — 74011000302 HC RX REV CODE- 302: Performed by: THORACIC SURGERY (CARDIOTHORACIC VASCULAR SURGERY)

## 2020-09-29 PROCEDURE — 74011250636 HC RX REV CODE- 250/636: Performed by: NURSE ANESTHETIST, CERTIFIED REGISTERED

## 2020-09-29 PROCEDURE — 76010000155 HC AUTO TRANSFUSION/CELL SAVER: Performed by: THORACIC SURGERY (CARDIOTHORACIC VASCULAR SURGERY)

## 2020-09-29 PROCEDURE — 77030025827 HC BG BLD DNR AUTLG MEDT -A: Performed by: THORACIC SURGERY (CARDIOTHORACIC VASCULAR SURGERY)

## 2020-09-29 PROCEDURE — 71045 X-RAY EXAM CHEST 1 VIEW: CPT

## 2020-09-29 PROCEDURE — 77030008771 HC TU NG SALEM SUMP -A: Performed by: NURSE ANESTHETIST, CERTIFIED REGISTERED

## 2020-09-29 PROCEDURE — 74011000272 HC RX REV CODE- 272: Performed by: THORACIC SURGERY (CARDIOTHORACIC VASCULAR SURGERY)

## 2020-09-29 PROCEDURE — 77030008477 HC STYL SATN SLP COVD -A: Performed by: NURSE ANESTHETIST, CERTIFIED REGISTERED

## 2020-09-29 PROCEDURE — 93005 ELECTROCARDIOGRAM TRACING: CPT | Performed by: THORACIC SURGERY (CARDIOTHORACIC VASCULAR SURGERY)

## 2020-09-29 PROCEDURE — 74011250637 HC RX REV CODE- 250/637: Performed by: PHYSICIAN ASSISTANT

## 2020-09-29 PROCEDURE — 77030018729 HC ELECTRD DEFIB PAD CARD -B: Performed by: THORACIC SURGERY (CARDIOTHORACIC VASCULAR SURGERY)

## 2020-09-29 PROCEDURE — 77030006824 HC BLD SAW SAG CNMD -B: Performed by: THORACIC SURGERY (CARDIOTHORACIC VASCULAR SURGERY)

## 2020-09-29 PROCEDURE — 77030018673: Performed by: THORACIC SURGERY (CARDIOTHORACIC VASCULAR SURGERY)

## 2020-09-29 PROCEDURE — 77030040922 HC BLNKT HYPOTHRM STRY -A: Performed by: NURSE ANESTHETIST, CERTIFIED REGISTERED

## 2020-09-29 PROCEDURE — 77030025869: Performed by: THORACIC SURGERY (CARDIOTHORACIC VASCULAR SURGERY)

## 2020-09-29 PROCEDURE — 30233N1 TRANSFUSION OF NONAUTOLOGOUS RED BLOOD CELLS INTO PERIPHERAL VEIN, PERCUTANEOUS APPROACH: ICD-10-PCS | Performed by: ANESTHESIOLOGY

## 2020-09-29 PROCEDURE — 77030020230: Performed by: NURSE ANESTHETIST, CERTIFIED REGISTERED

## 2020-09-29 PROCEDURE — 77030016687: Performed by: THORACIC SURGERY (CARDIOTHORACIC VASCULAR SURGERY)

## 2020-09-29 PROCEDURE — 74011250636 HC RX REV CODE- 250/636: Performed by: PHYSICIAN ASSISTANT

## 2020-09-29 PROCEDURE — 77030018571 HC SUT PROL1 J&J -B: Performed by: THORACIC SURGERY (CARDIOTHORACIC VASCULAR SURGERY)

## 2020-09-29 PROCEDURE — 84132 ASSAY OF SERUM POTASSIUM: CPT

## 2020-09-29 PROCEDURE — 85384 FIBRINOGEN ACTIVITY: CPT

## 2020-09-29 PROCEDURE — 85018 HEMOGLOBIN: CPT

## 2020-09-29 PROCEDURE — 77030013884 HC PRB ELECSRG BPLR ATRC -I: Performed by: THORACIC SURGERY (CARDIOTHORACIC VASCULAR SURGERY)

## 2020-09-29 PROCEDURE — 77030013794 HC KT TRNSDUC BLD EDWD -B: Performed by: NURSE ANESTHETIST, CERTIFIED REGISTERED

## 2020-09-29 PROCEDURE — 06BQ0ZZ EXCISION OF LEFT SAPHENOUS VEIN, OPEN APPROACH: ICD-10-PCS | Performed by: THORACIC SURGERY (CARDIOTHORACIC VASCULAR SURGERY)

## 2020-09-29 PROCEDURE — 82947 ASSAY GLUCOSE BLOOD QUANT: CPT

## 2020-09-29 PROCEDURE — 77030016564 HC BLD STRNL SAW4 CNMD -B: Performed by: THORACIC SURGERY (CARDIOTHORACIC VASCULAR SURGERY)

## 2020-09-29 PROCEDURE — 74011000250 HC RX REV CODE- 250: Performed by: NURSE ANESTHETIST, CERTIFIED REGISTERED

## 2020-09-29 PROCEDURE — P9045 ALBUMIN (HUMAN), 5%, 250 ML: HCPCS

## 2020-09-29 PROCEDURE — C1729 CATH, DRAINAGE: HCPCS | Performed by: THORACIC SURGERY (CARDIOTHORACIC VASCULAR SURGERY)

## 2020-09-29 PROCEDURE — 77030020751 HC FLTR TBNG TRNSFUS HAEM -A: Performed by: NURSE ANESTHETIST, CERTIFIED REGISTERED

## 2020-09-29 PROCEDURE — 83735 ASSAY OF MAGNESIUM: CPT

## 2020-09-29 PROCEDURE — 77030018846 HC SOL IRR STRL H20 ICUM -A

## 2020-09-29 PROCEDURE — 3E080GC INTRODUCTION OF OTHER THERAPEUTIC SUBSTANCE INTO HEART, OPEN APPROACH: ICD-10-PCS | Performed by: THORACIC SURGERY (CARDIOTHORACIC VASCULAR SURGERY)

## 2020-09-29 PROCEDURE — 77030010813: Performed by: THORACIC SURGERY (CARDIOTHORACIC VASCULAR SURGERY)

## 2020-09-29 PROCEDURE — 77030031139 HC SUT VCRL2 J&J -A: Performed by: THORACIC SURGERY (CARDIOTHORACIC VASCULAR SURGERY)

## 2020-09-29 PROCEDURE — 77030005537 HC CATH URETH BARD -A: Performed by: THORACIC SURGERY (CARDIOTHORACIC VASCULAR SURGERY)

## 2020-09-29 PROCEDURE — P9047 ALBUMIN (HUMAN), 25%, 50ML: HCPCS

## 2020-09-29 PROCEDURE — 77030018547 HC SUT ETHBND1 J&J -B: Performed by: THORACIC SURGERY (CARDIOTHORACIC VASCULAR SURGERY)

## 2020-09-29 PROCEDURE — 77030010512 HC APPL CLP LIG J&J -C: Performed by: THORACIC SURGERY (CARDIOTHORACIC VASCULAR SURGERY)

## 2020-09-29 PROCEDURE — 77030020751 HC FLTR TBNG TRNSFUS HAEM -A: Performed by: THORACIC SURGERY (CARDIOTHORACIC VASCULAR SURGERY)

## 2020-09-29 PROCEDURE — 74011636637 HC RX REV CODE- 636/637: Performed by: THORACIC SURGERY (CARDIOTHORACIC VASCULAR SURGERY)

## 2020-09-29 PROCEDURE — 77030003010 HC SUT SURG STL J&J -B: Performed by: THORACIC SURGERY (CARDIOTHORACIC VASCULAR SURGERY)

## 2020-09-29 PROCEDURE — 85027 COMPLETE CBC AUTOMATED: CPT

## 2020-09-29 PROCEDURE — 77030034888 HC SUT PROL 2 J&J -B: Performed by: THORACIC SURGERY (CARDIOTHORACIC VASCULAR SURGERY)

## 2020-09-29 PROCEDURE — 5A1221Z PERFORMANCE OF CARDIAC OUTPUT, CONTINUOUS: ICD-10-PCS | Performed by: THORACIC SURGERY (CARDIOTHORACIC VASCULAR SURGERY)

## 2020-09-29 PROCEDURE — 77030013797 HC KT TRNSDUC PRSSR EDWD -A: Performed by: THORACIC SURGERY (CARDIOTHORACIC VASCULAR SURGERY)

## 2020-09-29 PROCEDURE — 65610000006 HC RM INTENSIVE CARE

## 2020-09-29 DEVICE — IMPLANTABLE DEVICE: Type: IMPLANTABLE DEVICE | Site: HEART | Status: FUNCTIONAL

## 2020-09-29 RX ORDER — NICARDIPINE HYDROCHLORIDE 0.1 MG/ML
INJECTION INTRAVENOUS
Status: DISCONTINUED | OUTPATIENT
Start: 2020-09-29 | End: 2020-09-29 | Stop reason: HOSPADM

## 2020-09-29 RX ORDER — ACETAMINOPHEN 325 MG/1
650 TABLET ORAL
Status: DISCONTINUED | OUTPATIENT
Start: 2020-09-29 | End: 2020-10-12 | Stop reason: HOSPADM

## 2020-09-29 RX ORDER — AMIODARONE HYDROCHLORIDE 200 MG/1
200 TABLET ORAL EVERY 12 HOURS
Status: DISCONTINUED | OUTPATIENT
Start: 2020-09-29 | End: 2020-10-12 | Stop reason: HOSPADM

## 2020-09-29 RX ORDER — POTASSIUM CHLORIDE 14.9 MG/ML
10 INJECTION INTRAVENOUS AS NEEDED
Status: ACTIVE | OUTPATIENT
Start: 2020-09-29 | End: 2020-09-30

## 2020-09-29 RX ORDER — SODIUM CHLORIDE, SODIUM LACTATE, POTASSIUM CHLORIDE, CALCIUM CHLORIDE 600; 310; 30; 20 MG/100ML; MG/100ML; MG/100ML; MG/100ML
INJECTION, SOLUTION INTRAVENOUS
Status: DISCONTINUED | OUTPATIENT
Start: 2020-09-29 | End: 2020-09-29 | Stop reason: HOSPADM

## 2020-09-29 RX ORDER — PAPAVERINE HYDROCHLORIDE 30 MG/ML
INJECTION INTRAMUSCULAR; INTRAVENOUS AS NEEDED
Status: DISCONTINUED | OUTPATIENT
Start: 2020-09-29 | End: 2020-09-29 | Stop reason: HOSPADM

## 2020-09-29 RX ORDER — NITROGLYCERIN 20 MG/100ML
INJECTION INTRAVENOUS AS NEEDED
Status: DISCONTINUED | OUTPATIENT
Start: 2020-09-29 | End: 2020-09-29 | Stop reason: HOSPADM

## 2020-09-29 RX ORDER — NICARDIPINE HYDROCHLORIDE 0.1 MG/ML
INJECTION INTRAVENOUS AS NEEDED
Status: DISCONTINUED | OUTPATIENT
Start: 2020-09-29 | End: 2020-09-29 | Stop reason: HOSPADM

## 2020-09-29 RX ORDER — NITROGLYCERIN 20 MG/100ML
10-100 INJECTION INTRAVENOUS
Status: DISCONTINUED | OUTPATIENT
Start: 2020-09-29 | End: 2020-10-08

## 2020-09-29 RX ORDER — CEFAZOLIN SODIUM/WATER 2 G/20 ML
2 SYRINGE (ML) INTRAVENOUS EVERY 8 HOURS
Status: COMPLETED | OUTPATIENT
Start: 2020-09-29 | End: 2020-09-30

## 2020-09-29 RX ORDER — SODIUM CHLORIDE 9 MG/ML
INJECTION, SOLUTION INTRAVENOUS
Status: DISCONTINUED | OUTPATIENT
Start: 2020-09-29 | End: 2020-09-29 | Stop reason: HOSPADM

## 2020-09-29 RX ORDER — MAGNESIUM SULFATE 1 G/100ML
1 INJECTION INTRAVENOUS AS NEEDED
Status: DISCONTINUED | OUTPATIENT
Start: 2020-09-29 | End: 2020-10-09

## 2020-09-29 RX ORDER — CEFAZOLIN SODIUM/WATER 2 G/20 ML
2 SYRINGE (ML) INTRAVENOUS ONCE
Status: COMPLETED | OUTPATIENT
Start: 2020-09-29 | End: 2020-09-29

## 2020-09-29 RX ORDER — SODIUM CHLORIDE 9 MG/ML
25 INJECTION, SOLUTION INTRAVENOUS CONTINUOUS
Status: DISCONTINUED | OUTPATIENT
Start: 2020-09-29 | End: 2020-09-29

## 2020-09-29 RX ORDER — FENTANYL CITRATE 50 UG/ML
INJECTION, SOLUTION INTRAMUSCULAR; INTRAVENOUS AS NEEDED
Status: DISCONTINUED | OUTPATIENT
Start: 2020-09-29 | End: 2020-09-29 | Stop reason: HOSPADM

## 2020-09-29 RX ORDER — NALOXONE HYDROCHLORIDE 0.4 MG/ML
0.4 INJECTION, SOLUTION INTRAMUSCULAR; INTRAVENOUS; SUBCUTANEOUS AS NEEDED
Status: DISCONTINUED | OUTPATIENT
Start: 2020-09-29 | End: 2020-10-09

## 2020-09-29 RX ORDER — INSULIN GLARGINE 100 [IU]/ML
40 INJECTION, SOLUTION SUBCUTANEOUS ONCE
Status: COMPLETED | OUTPATIENT
Start: 2020-09-29 | End: 2020-09-29

## 2020-09-29 RX ORDER — MUPIROCIN 20 MG/G
OINTMENT TOPICAL 2 TIMES DAILY
Status: DISCONTINUED | OUTPATIENT
Start: 2020-09-29 | End: 2020-09-30

## 2020-09-29 RX ORDER — METOPROLOL TARTRATE 25 MG/1
25 TABLET, FILM COATED ORAL 2 TIMES DAILY
Status: DISCONTINUED | OUTPATIENT
Start: 2020-09-30 | End: 2020-09-29 | Stop reason: SDUPTHER

## 2020-09-29 RX ORDER — LIDOCAINE HYDROCHLORIDE 20 MG/ML
INJECTION, SOLUTION EPIDURAL; INFILTRATION; INTRACAUDAL; PERINEURAL AS NEEDED
Status: DISCONTINUED | OUTPATIENT
Start: 2020-09-29 | End: 2020-09-29 | Stop reason: HOSPADM

## 2020-09-29 RX ORDER — SODIUM BICARBONATE 1 MEQ/ML
SYRINGE (ML) INTRAVENOUS AS NEEDED
Status: DISCONTINUED | OUTPATIENT
Start: 2020-09-29 | End: 2020-09-29 | Stop reason: HOSPADM

## 2020-09-29 RX ORDER — VECURONIUM BROMIDE FOR INJECTION 1 MG/ML
INJECTION, POWDER, LYOPHILIZED, FOR SOLUTION INTRAVENOUS AS NEEDED
Status: DISCONTINUED | OUTPATIENT
Start: 2020-09-29 | End: 2020-09-29 | Stop reason: HOSPADM

## 2020-09-29 RX ORDER — MIDAZOLAM HYDROCHLORIDE 1 MG/ML
1 INJECTION, SOLUTION INTRAMUSCULAR; INTRAVENOUS
Status: DISCONTINUED | OUTPATIENT
Start: 2020-09-29 | End: 2020-09-29

## 2020-09-29 RX ORDER — AMIODARONE HYDROCHLORIDE 200 MG/1
600 TABLET ORAL ONCE
Status: COMPLETED | OUTPATIENT
Start: 2020-09-29 | End: 2020-09-29

## 2020-09-29 RX ORDER — ROCURONIUM BROMIDE 10 MG/ML
INJECTION, SOLUTION INTRAVENOUS AS NEEDED
Status: DISCONTINUED | OUTPATIENT
Start: 2020-09-29 | End: 2020-09-29 | Stop reason: HOSPADM

## 2020-09-29 RX ORDER — LIDOCAINE HCL/PF 100 MG/5ML
50-100 SYRINGE (ML) INTRAVENOUS
Status: DISCONTINUED | OUTPATIENT
Start: 2020-09-29 | End: 2020-09-29

## 2020-09-29 RX ORDER — MIDAZOLAM HYDROCHLORIDE 1 MG/ML
INJECTION, SOLUTION INTRAMUSCULAR; INTRAVENOUS AS NEEDED
Status: DISCONTINUED | OUTPATIENT
Start: 2020-09-29 | End: 2020-09-29 | Stop reason: HOSPADM

## 2020-09-29 RX ORDER — ROSUVASTATIN CALCIUM 20 MG/1
40 TABLET, COATED ORAL
Status: DISCONTINUED | OUTPATIENT
Start: 2020-09-29 | End: 2020-10-12 | Stop reason: HOSPADM

## 2020-09-29 RX ORDER — GUAIFENESIN 100 MG/5ML
81 LIQUID (ML) ORAL DAILY
Status: DISCONTINUED | OUTPATIENT
Start: 2020-09-30 | End: 2020-09-29 | Stop reason: SDUPTHER

## 2020-09-29 RX ORDER — METOPROLOL TARTRATE 25 MG/1
25 TABLET, FILM COATED ORAL EVERY 12 HOURS
Status: DISCONTINUED | OUTPATIENT
Start: 2020-09-30 | End: 2020-10-10

## 2020-09-29 RX ORDER — DEXTROSE, SODIUM CHLORIDE, AND POTASSIUM CHLORIDE 5; .45; .15 G/100ML; G/100ML; G/100ML
25 INJECTION INTRAVENOUS CONTINUOUS
Status: DISCONTINUED | OUTPATIENT
Start: 2020-09-29 | End: 2020-10-08

## 2020-09-29 RX ORDER — PROTAMINE SULFATE 10 MG/ML
INJECTION, SOLUTION INTRAVENOUS AS NEEDED
Status: DISCONTINUED | OUTPATIENT
Start: 2020-09-29 | End: 2020-09-29 | Stop reason: HOSPADM

## 2020-09-29 RX ORDER — SODIUM BICARBONATE 84 MG/ML
50 INJECTION, SOLUTION INTRAVENOUS AS NEEDED
Status: DISCONTINUED | OUTPATIENT
Start: 2020-09-29 | End: 2020-10-09

## 2020-09-29 RX ORDER — SODIUM CHLORIDE 0.9 % (FLUSH) 0.9 %
5-40 SYRINGE (ML) INJECTION AS NEEDED
Status: DISCONTINUED | OUTPATIENT
Start: 2020-09-29 | End: 2020-10-12 | Stop reason: SDUPTHER

## 2020-09-29 RX ORDER — ETOMIDATE 2 MG/ML
INJECTION INTRAVENOUS AS NEEDED
Status: DISCONTINUED | OUTPATIENT
Start: 2020-09-29 | End: 2020-09-29 | Stop reason: HOSPADM

## 2020-09-29 RX ORDER — SODIUM CHLORIDE, SODIUM LACTATE, POTASSIUM CHLORIDE, CALCIUM CHLORIDE 600; 310; 30; 20 MG/100ML; MG/100ML; MG/100ML; MG/100ML
100 INJECTION, SOLUTION INTRAVENOUS CONTINUOUS
Status: DISCONTINUED | OUTPATIENT
Start: 2020-09-29 | End: 2020-09-29

## 2020-09-29 RX ORDER — EPHEDRINE SULFATE/0.9% NACL/PF 50 MG/5 ML
SYRINGE (ML) INTRAVENOUS AS NEEDED
Status: DISCONTINUED | OUTPATIENT
Start: 2020-09-29 | End: 2020-09-29 | Stop reason: HOSPADM

## 2020-09-29 RX ORDER — OXYCODONE AND ACETAMINOPHEN 5; 325 MG/1; MG/1
1 TABLET ORAL
Status: DISCONTINUED | OUTPATIENT
Start: 2020-09-29 | End: 2020-09-30 | Stop reason: SDUPTHER

## 2020-09-29 RX ORDER — SODIUM CHLORIDE 0.9 % (FLUSH) 0.9 %
5-40 SYRINGE (ML) INJECTION EVERY 8 HOURS
Status: DISCONTINUED | OUTPATIENT
Start: 2020-09-29 | End: 2020-10-12 | Stop reason: SDUPTHER

## 2020-09-29 RX ORDER — NITROGLYCERIN 20 MG/100ML
INJECTION INTRAVENOUS
Status: DISCONTINUED | OUTPATIENT
Start: 2020-09-29 | End: 2020-09-29 | Stop reason: HOSPADM

## 2020-09-29 RX ORDER — PROPOFOL 10 MG/ML
0-50 VIAL (ML) INTRAVENOUS
Status: DISCONTINUED | OUTPATIENT
Start: 2020-09-29 | End: 2020-09-29

## 2020-09-29 RX ORDER — MORPHINE SULFATE 10 MG/ML
3-5 INJECTION, SOLUTION INTRAMUSCULAR; INTRAVENOUS
Status: DISCONTINUED | OUTPATIENT
Start: 2020-09-29 | End: 2020-09-30

## 2020-09-29 RX ORDER — GUAIFENESIN 100 MG/5ML
81 LIQUID (ML) ORAL DAILY
Status: DISCONTINUED | OUTPATIENT
Start: 2020-09-30 | End: 2020-10-12 | Stop reason: HOSPADM

## 2020-09-29 RX ORDER — ONDANSETRON 2 MG/ML
4 INJECTION INTRAMUSCULAR; INTRAVENOUS
Status: DISCONTINUED | OUTPATIENT
Start: 2020-09-29 | End: 2020-10-12 | Stop reason: HOSPADM

## 2020-09-29 RX ORDER — HEPARIN SODIUM 1000 [USP'U]/ML
INJECTION, SOLUTION INTRAVENOUS; SUBCUTANEOUS AS NEEDED
Status: DISCONTINUED | OUTPATIENT
Start: 2020-09-29 | End: 2020-09-29 | Stop reason: HOSPADM

## 2020-09-29 RX ORDER — DEXTROSE 50 % IN WATER (D50W) INTRAVENOUS SYRINGE
25 AS NEEDED
Status: DISCONTINUED | OUTPATIENT
Start: 2020-09-29 | End: 2020-10-09

## 2020-09-29 RX ORDER — EPINEPHRINE HCL IN 0.9 % NACL 4MG/250ML
.01-.05 PLASTIC BAG, INJECTION (ML) INTRAVENOUS
Status: DISCONTINUED | OUTPATIENT
Start: 2020-09-29 | End: 2020-10-08

## 2020-09-29 RX ORDER — CHLORHEXIDINE GLUCONATE 1.2 MG/ML
10 RINSE ORAL 2 TIMES DAILY
Status: DISCONTINUED | OUTPATIENT
Start: 2020-09-29 | End: 2020-09-29

## 2020-09-29 RX ORDER — GLYCOPYRROLATE 0.2 MG/ML
INJECTION INTRAMUSCULAR; INTRAVENOUS AS NEEDED
Status: DISCONTINUED | OUTPATIENT
Start: 2020-09-29 | End: 2020-09-29 | Stop reason: HOSPADM

## 2020-09-29 RX ADMIN — NICARDIPINE HYDROCHLORIDE 200 MCG: 0.1 INJECTION, SOLUTION INTRAVENOUS at 09:15

## 2020-09-29 RX ADMIN — INSULIN GLARGINE 40 UNITS: 100 INJECTION, SOLUTION SUBCUTANEOUS at 19:41

## 2020-09-29 RX ADMIN — GLYCOPYRROLATE 0.1 MG: 0.2 INJECTION, SOLUTION INTRAMUSCULAR; INTRAVENOUS at 07:57

## 2020-09-29 RX ADMIN — MIDAZOLAM 1 MG: 1 INJECTION INTRAMUSCULAR; INTRAVENOUS at 07:06

## 2020-09-29 RX ADMIN — DEXTROSE MONOHYDRATE 10 G: 5 INJECTION, SOLUTION INTRAVENOUS at 07:53

## 2020-09-29 RX ADMIN — EPINEPHRINE 0.01 MCG/KG/MIN: 1 INJECTION INTRAMUSCULAR; INTRAVENOUS; SUBCUTANEOUS at 12:00

## 2020-09-29 RX ADMIN — MIDAZOLAM 2 MG: 1 INJECTION INTRAMUSCULAR; INTRAVENOUS at 07:02

## 2020-09-29 RX ADMIN — NICARDIPINE HYDROCHLORIDE 200 MCG: 0.1 INJECTION, SOLUTION INTRAVENOUS at 09:21

## 2020-09-29 RX ADMIN — ROSUVASTATIN CALCIUM 40 MG: 20 TABLET, COATED ORAL at 20:09

## 2020-09-29 RX ADMIN — FENTANYL CITRATE 250 MCG: 50 INJECTION INTRAMUSCULAR; INTRAVENOUS at 09:12

## 2020-09-29 RX ADMIN — Medication 2 G: at 11:41

## 2020-09-29 RX ADMIN — VECURONIUM BROMIDE 2 MG: 1 INJECTION, POWDER, LYOPHILIZED, FOR SOLUTION INTRAVENOUS at 09:10

## 2020-09-29 RX ADMIN — PHENYLEPHRINE HYDROCHLORIDE 120 MCG: 10 INJECTION INTRAVENOUS at 09:43

## 2020-09-29 RX ADMIN — TUBERCULIN PURIFIED PROTEIN DERIVATIVE 5 UNITS: 5 INJECTION, SOLUTION INTRADERMAL at 19:42

## 2020-09-29 RX ADMIN — FENTANYL CITRATE 250 MCG: 50 INJECTION INTRAMUSCULAR; INTRAVENOUS at 07:16

## 2020-09-29 RX ADMIN — AMIODARONE HYDROCHLORIDE 400 MG: 200 TABLET ORAL at 06:07

## 2020-09-29 RX ADMIN — SODIUM CHLORIDE: 9 INJECTION, SOLUTION INTRAVENOUS at 12:46

## 2020-09-29 RX ADMIN — NITROGLYCERIN 10 MCG/MIN: 200 INJECTION, SOLUTION INTRAVENOUS at 07:53

## 2020-09-29 RX ADMIN — Medication 2 G: at 07:56

## 2020-09-29 RX ADMIN — NITROGLYCERIN 25 MCG: 200 INJECTION, SOLUTION INTRAVENOUS at 09:01

## 2020-09-29 RX ADMIN — NICARDIPINE HYDROCHLORIDE 200 MCG: 0.1 INJECTION, SOLUTION INTRAVENOUS at 09:29

## 2020-09-29 RX ADMIN — SODIUM CHLORIDE: 9 INJECTION, SOLUTION INTRAVENOUS at 07:29

## 2020-09-29 RX ADMIN — CHLORHEXIDINE GLUCONATE 10 ML: 1.2 RINSE ORAL at 18:00

## 2020-09-29 RX ADMIN — OXYCODONE HYDROCHLORIDE AND ACETAMINOPHEN 1 TABLET: 5; 325 TABLET ORAL at 19:48

## 2020-09-29 RX ADMIN — Medication 1 AMPULE: at 19:48

## 2020-09-29 RX ADMIN — LIDOCAINE HYDROCHLORIDE 100 MG: 20 INJECTION, SOLUTION EPIDURAL; INFILTRATION; INTRACAUDAL; PERINEURAL at 07:16

## 2020-09-29 RX ADMIN — SODIUM BICARBONATE 50 MEQ: 84 INJECTION, SOLUTION INTRAVENOUS at 12:36

## 2020-09-29 RX ADMIN — DEXTROSE MONOHYDRATE, SODIUM CHLORIDE, AND POTASSIUM CHLORIDE 25 ML/HR: 50; 4.5; 1.49 INJECTION, SOLUTION INTRAVENOUS at 14:15

## 2020-09-29 RX ADMIN — PHENYLEPHRINE HYDROCHLORIDE 240 MCG: 10 INJECTION INTRAVENOUS at 09:40

## 2020-09-29 RX ADMIN — SODIUM CHLORIDE 1 G/HR: 900 INJECTION, SOLUTION INTRAVENOUS at 08:24

## 2020-09-29 RX ADMIN — ACETAMINOPHEN 650 MG: 325 TABLET, FILM COATED ORAL at 18:27

## 2020-09-29 RX ADMIN — MIDAZOLAM 3 MG: 1 INJECTION INTRAMUSCULAR; INTRAVENOUS at 11:31

## 2020-09-29 RX ADMIN — GLYCOPYRROLATE 0.1 MG: 0.2 INJECTION, SOLUTION INTRAMUSCULAR; INTRAVENOUS at 08:36

## 2020-09-29 RX ADMIN — FAMOTIDINE 20 MG: 10 INJECTION INTRAVENOUS at 19:48

## 2020-09-29 RX ADMIN — VECURONIUM BROMIDE 3 MG: 1 INJECTION, POWDER, LYOPHILIZED, FOR SOLUTION INTRAVENOUS at 07:59

## 2020-09-29 RX ADMIN — FENTANYL CITRATE 100 MCG: 50 INJECTION INTRAMUSCULAR; INTRAVENOUS at 08:49

## 2020-09-29 RX ADMIN — SODIUM CHLORIDE 25 ML/HR: 900 INJECTION, SOLUTION INTRAVENOUS at 13:14

## 2020-09-29 RX ADMIN — NICARDIPINE HYDROCHLORIDE 5 MG/HR: 0.1 INJECTION, SOLUTION INTRAVENOUS at 09:22

## 2020-09-29 RX ADMIN — HEPARIN SODIUM 22000 UNITS: 1000 INJECTION, SOLUTION INTRAVENOUS; SUBCUTANEOUS at 08:59

## 2020-09-29 RX ADMIN — Medication 10 MG: at 07:57

## 2020-09-29 RX ADMIN — ROCURONIUM BROMIDE 30 MG: 10 INJECTION, SOLUTION INTRAVENOUS at 11:31

## 2020-09-29 RX ADMIN — FENTANYL CITRATE 100 MCG: 50 INJECTION INTRAMUSCULAR; INTRAVENOUS at 07:03

## 2020-09-29 RX ADMIN — NICARDIPINE HYDROCHLORIDE 200 MCG: 0.1 INJECTION, SOLUTION INTRAVENOUS at 09:25

## 2020-09-29 RX ADMIN — SODIUM CHLORIDE, SODIUM LACTATE, POTASSIUM CHLORIDE, AND CALCIUM CHLORIDE 100 ML/HR: 600; 310; 30; 20 INJECTION, SOLUTION INTRAVENOUS at 06:09

## 2020-09-29 RX ADMIN — Medication 10 ML: at 14:13

## 2020-09-29 RX ADMIN — ROCURONIUM BROMIDE 50 MG: 10 INJECTION, SOLUTION INTRAVENOUS at 07:16

## 2020-09-29 RX ADMIN — MORPHINE SULFATE 3 MG: 10 INJECTION INTRAVENOUS at 16:13

## 2020-09-29 RX ADMIN — MIDAZOLAM 2 MG: 1 INJECTION INTRAMUSCULAR; INTRAVENOUS at 07:16

## 2020-09-29 RX ADMIN — FENTANYL CITRATE 200 MCG: 50 INJECTION INTRAMUSCULAR; INTRAVENOUS at 07:59

## 2020-09-29 RX ADMIN — Medication 10 ML: at 19:39

## 2020-09-29 RX ADMIN — ETOMIDATE 26 MG: 2 INJECTION, SOLUTION INTRAVENOUS at 07:16

## 2020-09-29 RX ADMIN — Medication 3 AMPULE: at 06:07

## 2020-09-29 RX ADMIN — FENTANYL CITRATE 100 MCG: 50 INJECTION INTRAMUSCULAR; INTRAVENOUS at 10:54

## 2020-09-29 RX ADMIN — VECURONIUM BROMIDE 2 MG: 1 INJECTION, POWDER, LYOPHILIZED, FOR SOLUTION INTRAVENOUS at 10:00

## 2020-09-29 RX ADMIN — MIDAZOLAM 2 MG: 1 INJECTION INTRAMUSCULAR; INTRAVENOUS at 10:57

## 2020-09-29 RX ADMIN — PROTAMINE SULFATE 140 MG: 10 INJECTION, SOLUTION INTRAVENOUS at 12:13

## 2020-09-29 RX ADMIN — NICARDIPINE HYDROCHLORIDE 200 MCG: 0.1 INJECTION, SOLUTION INTRAVENOUS at 09:27

## 2020-09-29 RX ADMIN — CEFAZOLIN SODIUM 2 G: 100 INJECTION, POWDER, LYOPHILIZED, FOR SOLUTION INTRAVENOUS at 19:38

## 2020-09-29 RX ADMIN — SODIUM CHLORIDE 2.5 MG/HR: 900 INJECTION, SOLUTION INTRAVENOUS at 20:06

## 2020-09-29 RX ADMIN — SODIUM CHLORIDE 2 UNITS/HR: 900 INJECTION, SOLUTION INTRAVENOUS at 07:53

## 2020-09-29 RX ADMIN — NICARDIPINE HYDROCHLORIDE 200 MCG: 0.1 INJECTION, SOLUTION INTRAVENOUS at 09:30

## 2020-09-29 RX ADMIN — FENTANYL CITRATE 100 MCG: 50 INJECTION INTRAMUSCULAR; INTRAVENOUS at 08:55

## 2020-09-29 RX ADMIN — SODIUM CHLORIDE, SODIUM LACTATE, POTASSIUM CHLORIDE, AND CALCIUM CHLORIDE: 600; 310; 30; 20 INJECTION, SOLUTION INTRAVENOUS at 07:02

## 2020-09-29 NOTE — PROGRESS NOTES
TRANSFER - IN REPORT:    Verbal report received from Tabitha Crook CRNA(name) on [employer]  being received from OR(unit) for routine post - op      Report consisted of patients Situation, Background, Assessment and   Recommendations(SBAR). Information from the following report(s) SBAR, Kardex, OR Summary, Intake/Output, MAR, Recent Results and Cardiac Rhythm paced was reviewed with the receiving nurse. Per anesthesia on admission patient intubation Normal    Collaborative team agrees of surgeon, anesthesia, RT, and RN agrees to proceed with CV weaning protocol        Opportunity for questions and clarification was provided. Assessment completed upon patients arrival to unit and care assumed.

## 2020-09-29 NOTE — BRIEF OP NOTE
Brief Postoperative Note    Patient: Judi Fuentes  YOB: 1960  MRN: [id number]    Date of Procedure: 9/29/2020     Pre-Op Diagnosis: Atherosclerosis of native coronary artery with unstable angina pectoris, unspecified whether native or transplanted heart ([facility] [address] [I25.110]  Paroxysmal A-fib ([facility] [address] [I48.0]    Post-Op Diagnosis: Same as preoperative diagnosis. Procedure(s):  CORONARY ARTERY BYPASS GRAFT (CABG X4), LIMA to the DIAG, SVG to distal PDA and sequential SVG to OM and PL  MAZE PROCEDURE  CLIPPING OF LEFT ATRIAL APPENDAGE  VEIN HARVEST, GREATER SAPHENOUS  ESOPHAGEAL TRANS ECHOCARDIOGRAM    Surgeon(s):  Syed Meza MD    Surgical Assistant: None    Anesthesia: General     Estimated Blood Loss (mL): Minimal    Complications: None    Specimens: * No specimens in log *     Implants:   Implant Name Type Inv.  Item Serial No.  Lot No. LRB No. Used Action   CLIP ATRIAL SYS EXCL 35MM --  - [id number]  CLIP ATRIAL SYS EXCL 35MM --   [employer] [id number] Left 1 Implanted       Drains:   Orogastric Tube 09/29/20 (Active)       Findings:      Electronically Signed by Ulisses Mg MD on 9/29/2020 at 1:16 PM

## 2020-09-29 NOTE — ANESTHESIA PROCEDURE NOTES
Arterial Line Placement    Start time: 9/29/2020 7:07 AM  End time: 9/29/2020 7:09 AM  Performed by: Eliza Handy CRNA  Authorized by: Birdie Bedolla MD     Pre-Procedure  Indications:  Arterial pressure monitoring and blood sampling  Preanesthetic Checklist: patient identified, risks and benefits discussed, anesthesia consent, site marked, patient being monitored, timeout performed and patient being monitored    Timeout Time: 07:07        Procedure:   Prep:  ChloraPrep  Seldinger Technique?: Yes    Orientation:  Left  Location:  Radial artery  Catheter size:  20 G  Number of attempts:  1  Cont Cardiac Output Sensor: Yes      Assessment:   Post-procedure:  Line secured and sterile dressing applied  Patient Tolerance:  Patient tolerated the procedure well with no immediate complications  Comment:   Left arm prepped with ChloraPrep, 0.8ml of 1% lidocaine infiltrated at skin, Seldinger technique, good blood return, good waveform.

## 2020-09-29 NOTE — CONSULTS
Cardiovascular ICU Consult Note: 9/29/2020  [facility]  Admission Date: 9/29/2020     The patient's chart is reviewed and the patient is discussed with the staff. Subjective:     Patient is seen at the request of Dr. Vance Genao for respiratory management status post cardiac surgery. Patient had CABG x 4. Currently is sedated in CV-ICU and orally intubated receiving  mechanical ventilation. Pt presented to the ER at [facility] on 9/18/2020 with chest pain and dyspnea. She was found to be in afib RVR. She was started on cardizem and admitted by cardiology. She underwent LHC by Dr. Hugh Ricks which revealed MVCAD. Pt was seen by CT surgery and deemed appropriate for CABG. We have been asked to see in the CV-ICU for mechanical ventilation management and weaning. Prior to Admission Medications   Prescriptions Last Dose Informant Patient Reported? Taking? ZINC PO 9/23/2020 at Unknown time  Yes Yes   Sig: Take  by mouth. Hold for procedure. amiodarone (CORDARONE) 200 mg tablet 9/29/2020 at 0430  No Yes   Sig: Take 1 Tab by mouth two (2) times a day. Patient taking differently: Take 200 mg by mouth two (2) times a day. Take morning of procedure. aspirin 81 mg chewable tablet 9/29/2020 at 0430  Yes Yes   Sig: Take 81 mg by mouth daily. Take morning of procedure. furosemide (Lasix) 40 mg tablet Not Taking at Unknown time  Yes No   Sig: Take 40 mg by mouth daily as needed. Do not take morning of procedure. lisinopriL (PRINIVIL, ZESTRIL) 20 mg tablet 9/28/2020 at Unknown time  No Yes   Sig: Take 1 Tab by mouth daily. Patient taking differently: Take 20 mg by mouth daily. Do not take morning of procedure. metoprolol tartrate (LOPRESSOR) 25 mg tablet 9/29/2020 at 0430  No Yes   Sig: Take 1 Tab by mouth two (2) times a day. Patient taking differently: Take 25 mg by mouth two (2) times a day. Take morning of procedure.  Per Christina Mireles reduce dose to 12.5 mg   nitroglycerin (NITROSTAT) 0.4 mg SL tablet Not Taking at Unknown time  No No   Si Tab by SubLINGual route every five (5) minutes as needed for Chest Pain. Up to 3 doses. Patient taking differently: 0.4 mg by SubLINGual route every five (5) minutes as needed for Chest Pain. Up to 3 doses. Bring nitroglycerin with you morning of procedure. rivaroxaban (XARELTO) 20 mg tab tablet 2020  No No   Sig: Take 1 Tab by mouth daily (with breakfast). Patient taking differently: Take 20 mg by mouth daily (with breakfast). Last dose 20   rosuvastatin (CRESTOR) 40 mg tablet 2020 at Unknown time  No Yes   Sig: Take 1 Tab by mouth nightly. Facility-Administered Medications: None       Review of Systems  Sedated on vent an not able to able to assess.      Past Medical History:   Diagnosis Date    Anxiety     Borderline diabetes     Endometrial cancer ([facility] [address]     Essential hypertension 2020    Heart failure (HCC)     EF 55-60% ECHO 20    History of anemia     Mixed hyperlipidemia 2020    Paroxysmal atrial fibrillation ([facility] [address] 2020- SB at [facility]  appt 20    Sleep apnea     uses CPAP QHS     Past Surgical History:   Procedure Laterality Date    HX CYST REMOVAL      from scalp as kid    HX CYST REMOVAL      HX HEART CATHETERIZATION  2020    HX HYSTERECTOMY      HX ORTHOPAEDIC      left elbow surgery     Social History     Socioeconomic History    Marital status: SINGLE     Spouse name: Not on file    Number of children: Not on file    Years of education: Not on file    Highest education level: Not on file   Occupational History    Not on file   Social Needs    Financial resource strain: Not on file    Food insecurity     Worry: Not on file     Inability: Not on file    Transportation needs     Medical: Not on file     Non-medical: Not on file   Tobacco Use    Smoking status: Current Every Day Smoker     Packs/day: 0.50    Smokeless tobacco: Never Used   Substance and Sexual Activity    Alcohol use: Not Currently    Drug use: Not on file    Sexual activity: Not on file   Lifestyle    Physical activity     Days per week: Not on file     Minutes per session: Not on file    Stress: Not on file   Relationships    Social connections     Talks on phone: Not on file     Gets together: Not on file     Attends Faith service: Not on file     Active member of club or organization: Not on file     Attends meetings of clubs or organizations: Not on file     Relationship status: Not on file    Intimate partner violence     Fear of current or ex partner: Not on file     Emotionally abused: Not on file     Physically abused: Not on file     Forced sexual activity: Not on file   Other Topics Concern    Not on file   Social History Narrative    Not on file     Family History   Problem Relation Age of Onset    Coronary Artery Disease Mother     Cancer Mother     Hypertension Mother     Coronary Artery Disease Father     Hypertension Father     Coronary Artery Disease Brother     Hypertension Brother     Lung Disease Brother      No Known Allergies    Current Facility-Administered Medications   Medication Dose Route Frequency    alcohol 62% (NOZIN) nasal  1 Ampule  1 Ampule Topical Q12H    rosuvastatin (CRESTOR) tablet 40 mg  40 mg Oral QHS    0.9% sodium chloride infusion  25 mL/hr IntraVENous CONTINUOUS    dextrose 5% - 0.45% NaCl with KCl 20 mEq/L infusion  25 mL/hr IntraVENous CONTINUOUS    sodium chloride (NS) flush 5-40 mL  5-40 mL IntraVENous Q8H    sodium chloride (NS) flush 5-40 mL  5-40 mL IntraVENous PRN    oxyCODONE-acetaminophen (PERCOCET) 5-325 mg per tablet 1 Tab  1 Tab Oral Q4H PRN    morphine 10 mg/ml injection 3-5 mg  3-5 mg IntraVENous Q1H PRN    naloxone (NARCAN) injection 0.4 mg  0.4 mg IntraVENous PRN    mupirocin (BACTROBAN) 2 % ointment   Both Nostrils BID    ceFAZolin (ANCEF) 2 g/20 mL in sterile water IV syringe  2 g IntraVENous Q8H    sodium bicarbonate (8.4%) injection 50 mEq  50 mEq IntraVENous PRN    nitroglycerin (Tridil) 200 mcg/ml infusion   mcg/min IntraVENous TITRATE    lidocaine (PF) (XYLOCAINE) 100 mg/5 mL (2 %) injection syringe  mg   mg IntraVENous ONCE PRN    amiodarone (CORDARONE) tablet 200 mg  200 mg Oral Q12H    [START ON 9/30/2020] metoprolol tartrate (LOPRESSOR) tablet 25 mg  25 mg Oral Q12H    ondansetron (ZOFRAN) injection 4 mg  4 mg IntraVENous Q4H PRN    insulin regular (NOVOLIN R, HUMULIN R) 100 Units in 0.9% sodium chloride 100 mL infusion  1 Units/hr IntraVENous TITRATE    dextrose (D50W) injection syrg 12.5 g  25 mL IntraVENous PRN    [START ON 9/30/2020] aspirin chewable tablet 81 mg  81 mg Oral DAILY    magnesium sulfate 1 g/100 ml IVPB (premix or compounded)  1 g IntraVENous PRN    potassium chloride 10 mEq in 50 ml IVPB  10 mEq IntraVENous PRN    midazolam (VERSED) injection 1 mg  1 mg IntraVENous Q1H PRN    propofol (DIPRIVAN) 10 mg/mL infusion  0-50 mcg/kg/min IntraVENous TITRATE    chlorhexidine (PERIDEX) 0.12 % mouthwash 10 mL  10 mL Oral BID    tuberculin injection 5 Units  5 Units IntraDERMal ONCE    famotidine (PF) (PEPCID) 20 mg in 0.9% sodium chloride 10 mL injection  20 mg IntraVENous Q12H    EPINEPHrine (ADRENALIN) 4 mg in 0.9% sodium chloride 250 mL infusion (PRE-MIX)  0.01-0.05 mcg/kg/min IntraVENous TITRATE         Objective:     Vitals:    09/29/20 0540 09/29/20 1314 09/29/20 1318   BP: (!) 225/96  (!) 109/56   Pulse: (!) 56 81 80   Resp: 18 15 15   Temp: 98.2 °F (36.8 °C)  98.1 °F (36.7 °C)   SpO2: 98% (!) 83% 98%   Weight: 180 lb 2 oz (81.7 kg)     Height: 5' 3\" (1.6 m)         Intake and Output:   No intake/output data recorded. 09/29 0701 - 09/29 1900  In: 2010 [I.V.:1200]  Out: 65 [Urine:675]    Physical Exam:          Constitutional:  Sedated, orally intubated and mechanically ventilated.   EENMT:  Sclera clear, pupils equal, oral mucosa moist and orally intubated  Respiratory: coarse sounds b/l. No wheezing or rhonchi. Neck is large. Chest tube with very slow air leak  Cardiovascular:  RRR with no M,G,R;  Gastrointestinal:  soft; no bowel sounds present  Musculoskeletal:  warm with no cyanosis, no lower extremity edema. [place] site left leg with ace wrap. Sedated with no movements. SKIN:  no jaundice or ecchymosis   Neurologic:  sedated but no gross neuro deficits  Psychiatric:  sedated and unable to assess at this time    CXR:        LINES:  ETT, will, swan bertha, arterial line, chest tubes times x2 in epigastric area without air leak. DRIPS:  NTGG, Cardene, Amican, Insulin    CI: 2.3    Ventilator Settings  Mode FIO2 Rate Tidal Volume Pressure PEEP   SIMV, PRVC, Pressure support  100 %    450 ml  10 cm H2O  8 cm H20      Peak airway pressure: 26.8 cm H2O   Minute ventilation: 7.4 l/min      Name:  Bonita Kendall  MR#:  [id number]  :  1960  ACCOUNT #:  [de-identified]  DATE OF SERVICE:  2020     SPIROMETRY     INTERPRETATION:  The flow volume loop demonstrates flattening of the inspiratory limb which can be seen with variable extrathoracic obstruction. Clinical correlation is required.     Otherwise, spirometry suggests mild restriction.     ABG:   Recent Labs     20  1327 20  1232 20  1206   PHI 7.38 7.28* 7.31*   PCO2I 39.6 47.3* 49.2*   PO2I 80 137* 233*   HCO3I 23.4 22.0 24.6        LAB  Recent Labs     20  1055   INR 1.0     Recent Labs     20  1055   MG 2.2     No results for input(s): LCAD, LAC in the last 72 hours.     Assessment and Plan :  (Medical Decision Making)     Hospital Problems  Date Reviewed: 2020          Codes Class Noted POA    Paroxysmal A-fib [facility]) ICD-10-CM: I48.0  ICD-9-CM: 427.31  2020 Unknown        Atherosclerosis of native coronary artery of native heart with unstable angina pectoris ([facility] [address] ICD-10-CM: I25.110  ICD-9-CM: 414.01, 411.1  2020 Unknown        Encounter for weaning from ventilator [facility]) ICD-10-CM: Z99.11  ICD-9-CM: V46.13  9/29/2020 Unknown        Hypoxemia ICD-10-CM: R09.02  ICD-9-CM: 799.02  9/29/2020 Unknown        S/P CABG x 4 ICD-10-CM: Z95.1  ICD-9-CM: V45.81  9/29/2020 Unknown        Suspected sleep apnea ICD-10-CM: R29.818  ICD-9-CM: 781.99  9/29/2020 Unknown              Plan:   --Wean mechanical ventilation per protocol. --Incentive spirometry every hour post extubation. --Review CXR when done  --chest tube per surgery  --consider PSG as outpatient, if agreeable. More than 50% of the time documented was spent in face-to-face contact with the patient and in the care of the patient on the floor/unit where the patient is located. Thank you for this referral.  We appreciate the opportunity to participate in this patient's care. Will follow along with you.     James Garsia MD

## 2020-09-29 NOTE — ANESTHESIA POSTPROCEDURE EVALUATION
Procedure(s):  CORONARY ARTERY BYPASS GRAFT (CABG X4), LIMA  MAZE PROCEDURE  CLIPPING OF LEFT ATRIAL APPENDAGE  VEIN HARVEST, GREATER SAPHENOUS  ESOPHAGEAL TRANS ECHOCARDIOGRAM.    general    Anesthesia Post Evaluation      Multimodal analgesia: multimodal analgesia used between 6 hours prior to anesthesia start to PACU discharge  Patient location during evaluation: ICU  Patient participation: complete - patient participated  Post-procedure mental status: sedated. Pain score: 0  Pain management: adequate  Airway patency: patent  Anesthetic complications: no  Cardiovascular status: acceptable and hemodynamically stable  Respiratory status: ETT and intubated  Hydration status: acceptable  Post anesthesia nausea and vomiting:  none  Final Post Anesthesia Temperature Assessment:  Normothermia (36.0-37.5 degrees C)      INITIAL Post-op Vital signs:   Vitals Value Taken Time   /59 9/29/2020  1:59 PM   Temp 36.8 °C (98.2 °F) 9/29/2020  2:03 PM   Pulse 81 9/29/2020  2:03 PM   Resp 14 9/29/2020  2:03 PM   SpO2 99 % 9/29/2020  2:03 PM   Vitals shown include unvalidated device data.

## 2020-09-29 NOTE — PROGRESS NOTES
Spiritual Care Visit, initial visit. Visited with patient at bedside. Prayed for patient's healing and health. Visit by Colin Thakur, Staff .  Dion., Ivan.MANNY., B.A.

## 2020-09-29 NOTE — PROGRESS NOTES
TRANSFER - IN REPORT:    Verbal report received from Olivier Huerta CRNA on [employer] being received from [address] [place] [address] for routine progression of care      Report consisted of patients Situation, Background, Assessment and Recommendations(SBAR). Information from the following report(s) SBAR, Procedure Summary, MAR, Recent Results and Alarm Parameters  was reviewed with the receiving nurse. Opportunity for questions and clarification was provided. Assessment completed upon patients arrival to unit and care assumed. complains of pain/discomfort

## 2020-09-29 NOTE — PERIOP NOTES
TRANSFER - OUT REPORT:    Verbal report given to [facility] on Anatoliy Mayes  being transferred to CVICU for routine progression of care       Report consisted of patients Situation, Background, Assessment and   Recommendations(SBAR). Information from the following report(s) OR Summary was reviewed with the receiving nurse. Lines:   Double Lumen 09/29/20 Right Internal jugular (Active)       Peripheral IV 09/29/20 Right Hand (Active)   Site Assessment Clean, dry, & intact 09/29/20 0605   Phlebitis Assessment 0 09/29/20 0605   Infiltration Assessment 0 09/29/20 0605   Dressing Status Clean, dry, & intact 09/29/20 0605   Dressing Type Transparent;Tape 09/29/20 0605   Hub Color/Line Status Infusing;Patent 09/29/20 0605       Arterial Line 09/29/20 Left Radial artery (Active)        Opportunity for questions and clarification was provided.       Patient transported with:   Monitor  O2 @ 15 liters  Registered Nurse   CRNA  MDA

## 2020-09-29 NOTE — ANESTHESIA PROCEDURE NOTES
[facility]  Date/Time: 9/29/2020 7:54 AM  Ordering Provider: Easton Barreto MD    Procedure Details: probe placement, image aquisition & interpretation    Site marked, 07:07  Risks and benefits discussed with the patient and plans are to proceed    Procedure Note    Performed by: Leola Cotto MD  Authorized by: Leola Cotto MD       Indications: assessment of ascending aorta, assessment of surgical repair and suspected pericardial effusion  Modalities: 2D, CF, CWD, contrast, PWD  Probe Type: biplane  Insertion: atraumatic  Patient Status: intubated and sedated    Echocardiographic and Doppler Measurements   Aorta  Size  Diam(cm)  Dissection PlaqueThick(mm)  Plaque Mobile    Ascending normal  No      Arch normal  No      Descending normal  No            Valves  Annulus  Stenosis  Area/Grad  Regurg  Leaflet   Morph  Leaflet   Motion    Aortic normal none  0 normal normal    Mitral normal none  1+ normal normal    Tricuspid normal none  1+ normal normal          Atria  Size  SEC (smoke)  Thrombus  Tumor  Device    Rt Atrium normal No No No No    Lt Atrium normal No No No No     Interatrial Septum Morphology: normal    Interventricular Septum Morphology: normal    Ventricle  Cavity Size  Cavity Dimension Hypertrophy  Thrombus  Gloal FXN  EF    RV normal  No no normal     LV normal   No normal 55       Regional Function  (1 = normal, 2 = mildly hypokinetic, 3 = severely hypokinetic, 4 = akinetic, 5 = dyskinetic) LAV - Long [place] View   ME LAV = 0  ME LAV = 90  ME LAV = 130                                     Pericardium: normal    Post Intervention Follow-up Study  Ventricular Global Function: unchanged  Ventricular Regional Function: unchanged     Valve  Function  Regurgitation  Area    Aortic       Mitral       Tricuspid       Prosthetic          Comments: Discussed findings pre and post surgery

## 2020-09-29 NOTE — ANESTHESIA PROCEDURE NOTES
Central Line Placement     Start time: 9/29/2020 7:21 AM  End time: 9/29/2020 7:26 AM  Performed by: Mauricio Eddy MD  Authorized by: Mauricio Eddy MD     Indications: vascular access, central pressure monitoring, need for vasopressors and sepsis protocol  Preanesthetic Checklist: patient identified, risks and benefits discussed, anesthesia consent, site marked, patient being monitored and timeout performed    Timeout Time: 07:07       Pre-procedure: All elements of maximal sterile barrier technique followed? Yes    2% Chlorhexidine for cutaneous antisepsis, Hand hygiene performed prior to catheter insertion and Ultrasound guidance    Sterile Ultrasound Technique followed?: Yes        Ultrasound Image Stored? Image stored    Procedure:   Prep:  ChloraPrep  Location: internal jugular  Orientation:  Right  Patient position:  Trendelenburg  Catheter type:  Double lumen  Catheter size:  8.5 Fr  Catheter length:  12 cm  Number of attempts:  1  Successful placement: Yes      Assessment:   Post-procedure:  Catheter secured and sterile dressing applied  Assessment:  Blood return through all ports, guidewire removal verified and free fluid flow  Insertion:  Uncomplicated  Patient tolerance:  Patient tolerated the procedure well with no immediate complications  Potential access sites were examined with ultrasound and the acceptable patient site was selacted. The needle path and vein access were visualized in real time using ultrasonography, an image was recored for permanent record.

## 2020-09-29 NOTE — PERIOP NOTES
Family updated at 10:37 AM by Simona Layne. Left message with Marva Barthel at the  of the waiting room.

## 2020-09-29 NOTE — PROCEDURES
[facility]  PROCEDURE NOTE    Name:  Can Troy  MR#:  [id number]  :  1960  ACCOUNT #:  [de-identified]  DATE OF SERVICE:  2020    SPIROMETRY    INTERPRETATION:  The flow volume loop demonstrates flattening of the inspiratory limb which can be seen with variable extrathoracic obstruction. Clinical correlation is required. Otherwise, spirometry suggests mild restriction.       Collin Rose MD      [id number]/  D:  2020 18:57  T:  2020 5:16  JOB #:  [id number]

## 2020-09-29 NOTE — PROGRESS NOTES
Dual skin assessment completed upon pt's arrival to unit. Sacrum with no redness or breakdown noted under allevyn. Midsternal and LLE incisions with dressings c/d/i. See wound and LDA flowsheets.

## 2020-09-29 NOTE — PROGRESS NOTES
Patient out from operating room and placed on the ventilator on documented settings. Patient is orally intubated with a # 7.0 ET Tube secured at the 21 cm maite at the lip. Breath sounds are diminished. Trachea is midline. Negative for subcutaneous air, chest excursion is symmetric. Negative for pitting edema. Patient is also Negative for cyanosis. Patient has a Left Radial arterial line. Patient has 3 Chest tubes. All alarms are set and audible. Resuscitation bag is at the head of the bed. Ventilator Settings  Mode FIO2 Rate Tidal Volume Pressure PEEP I:E Ratio   SIMV, PRVC, Pressure support  100 %   14 450 ml  10 cm H2O  8 cm H20  1:3.33      Peak airway pressure: 26.8 cm H2O   Minute ventilation: 7.4 l/min     ABG: No results for input(s): PH, PCO2, PO2, HCO3 in the last 72 hours.       Adair Carrillo, RT

## 2020-09-30 ENCOUNTER — APPOINTMENT (OUTPATIENT)
Dept: GENERAL RADIOLOGY | Age: 60
DRG: 228 | End: 2020-09-30
Attending: THORACIC SURGERY (CARDIOTHORACIC VASCULAR SURGERY)
Payer: COMMERCIAL

## 2020-09-30 PROBLEM — Z99.89 OSA ON CPAP: Status: ACTIVE | Noted: 2020-09-30

## 2020-09-30 PROBLEM — G47.33 OSA ON CPAP: Status: ACTIVE | Noted: 2020-09-30

## 2020-09-30 LAB
ANION GAP SERPL CALC-SCNC: 2 MMOL/L (ref 7–16)
BUN SERPL-MCNC: 12 MG/DL (ref 8–23)
CALCIUM SERPL-MCNC: 7.1 MG/DL (ref 8.3–10.4)
CHLORIDE SERPL-SCNC: 113 MMOL/L (ref 98–107)
CO2 SERPL-SCNC: 28 MMOL/L (ref 21–32)
CREAT SERPL-MCNC: 1.03 MG/DL (ref 0.6–1)
ERYTHROCYTE [DISTWIDTH] IN BLOOD BY AUTOMATED COUNT: 16.1 % (ref 11.9–14.6)
GLUCOSE BLD STRIP.AUTO-MCNC: 114 MG/DL (ref 65–100)
GLUCOSE BLD STRIP.AUTO-MCNC: 116 MG/DL (ref 65–100)
GLUCOSE BLD STRIP.AUTO-MCNC: 125 MG/DL (ref 65–100)
GLUCOSE BLD STRIP.AUTO-MCNC: 125 MG/DL (ref 65–100)
GLUCOSE BLD STRIP.AUTO-MCNC: 128 MG/DL (ref 65–100)
GLUCOSE BLD STRIP.AUTO-MCNC: 132 MG/DL (ref 65–100)
GLUCOSE BLD STRIP.AUTO-MCNC: 134 MG/DL (ref 65–100)
GLUCOSE BLD STRIP.AUTO-MCNC: 138 MG/DL (ref 65–100)
GLUCOSE BLD STRIP.AUTO-MCNC: 139 MG/DL (ref 65–100)
GLUCOSE BLD STRIP.AUTO-MCNC: 164 MG/DL (ref 65–100)
GLUCOSE SERPL-MCNC: 128 MG/DL (ref 65–100)
HCT VFR BLD AUTO: 38.8 % (ref 35.8–46.3)
HGB BLD-MCNC: 12.1 G/DL (ref 11.7–15.4)
MAGNESIUM SERPL-MCNC: 2.3 MG/DL (ref 1.8–2.4)
MCH RBC QN AUTO: 28.9 PG (ref 26.1–32.9)
MCHC RBC AUTO-ENTMCNC: 31.2 G/DL (ref 31.4–35)
MCV RBC AUTO: 92.8 FL (ref 79.6–97.8)
MM INDURATION POC: 0 MM (ref 0–5)
NRBC # BLD: 0 K/UL (ref 0–0.2)
PLATELET # BLD AUTO: 170 K/UL (ref 150–450)
PMV BLD AUTO: 11.3 FL (ref 9.4–12.3)
POTASSIUM SERPL-SCNC: 4.4 MMOL/L (ref 3.5–5.1)
PPD POC: NEGATIVE NEGATIVE
RBC # BLD AUTO: 4.18 M/UL (ref 4.05–5.2)
SODIUM SERPL-SCNC: 143 MMOL/L (ref 136–145)
WBC # BLD AUTO: 17.2 K/UL (ref 4.3–11.1)

## 2020-09-30 PROCEDURE — 80048 BASIC METABOLIC PNL TOTAL CA: CPT

## 2020-09-30 PROCEDURE — 74011250636 HC RX REV CODE- 250/636: Performed by: THORACIC SURGERY (CARDIOTHORACIC VASCULAR SURGERY)

## 2020-09-30 PROCEDURE — 74011250637 HC RX REV CODE- 250/637: Performed by: THORACIC SURGERY (CARDIOTHORACIC VASCULAR SURGERY)

## 2020-09-30 PROCEDURE — 74011636637 HC RX REV CODE- 636/637: Performed by: THORACIC SURGERY (CARDIOTHORACIC VASCULAR SURGERY)

## 2020-09-30 PROCEDURE — 71045 X-RAY EXAM CHEST 1 VIEW: CPT

## 2020-09-30 PROCEDURE — 77010033711 HC HIGH FLOW OXYGEN

## 2020-09-30 PROCEDURE — 36592 COLLECT BLOOD FROM PICC: CPT

## 2020-09-30 PROCEDURE — 85027 COMPLETE CBC AUTOMATED: CPT

## 2020-09-30 PROCEDURE — 83735 ASSAY OF MAGNESIUM: CPT

## 2020-09-30 PROCEDURE — 65610000006 HC RM INTENSIVE CARE

## 2020-09-30 PROCEDURE — 74011000258 HC RX REV CODE- 258: Performed by: THORACIC SURGERY (CARDIOTHORACIC VASCULAR SURGERY)

## 2020-09-30 PROCEDURE — 74011000250 HC RX REV CODE- 250: Performed by: THORACIC SURGERY (CARDIOTHORACIC VASCULAR SURGERY)

## 2020-09-30 PROCEDURE — 74011250637 HC RX REV CODE- 250/637: Performed by: PHYSICIAN ASSISTANT

## 2020-09-30 PROCEDURE — 2709999900 HC NON-CHARGEABLE SUPPLY

## 2020-09-30 PROCEDURE — 94660 CPAP INITIATION&MGMT: CPT

## 2020-09-30 PROCEDURE — 93005 ELECTROCARDIOGRAM TRACING: CPT | Performed by: THORACIC SURGERY (CARDIOTHORACIC VASCULAR SURGERY)

## 2020-09-30 PROCEDURE — 99232 SBSQ HOSP IP/OBS MODERATE 35: CPT | Performed by: INTERNAL MEDICINE

## 2020-09-30 RX ORDER — LORAZEPAM 1 MG/1
1 TABLET ORAL
Status: DISCONTINUED | OUTPATIENT
Start: 2020-09-30 | End: 2020-10-02

## 2020-09-30 RX ORDER — FUROSEMIDE 10 MG/ML
40 INJECTION INTRAMUSCULAR; INTRAVENOUS ONCE
Status: COMPLETED | OUTPATIENT
Start: 2020-09-30 | End: 2020-09-30

## 2020-09-30 RX ORDER — OXYCODONE AND ACETAMINOPHEN 5; 325 MG/1; MG/1
1 TABLET ORAL
Status: DISCONTINUED | OUTPATIENT
Start: 2020-09-30 | End: 2020-10-12 | Stop reason: HOSPADM

## 2020-09-30 RX ORDER — TRAMADOL HYDROCHLORIDE 50 MG/1
100 TABLET ORAL
Status: DISCONTINUED | OUTPATIENT
Start: 2020-09-30 | End: 2020-10-12 | Stop reason: HOSPADM

## 2020-09-30 RX ORDER — KETOROLAC TROMETHAMINE 30 MG/ML
30 INJECTION, SOLUTION INTRAMUSCULAR; INTRAVENOUS
Status: COMPLETED | OUTPATIENT
Start: 2020-09-30 | End: 2020-09-30

## 2020-09-30 RX ADMIN — Medication 1 AMPULE: at 21:14

## 2020-09-30 RX ADMIN — Medication 10 ML: at 02:47

## 2020-09-30 RX ADMIN — CEFAZOLIN SODIUM 2 G: 100 INJECTION, POWDER, LYOPHILIZED, FOR SOLUTION INTRAVENOUS at 03:00

## 2020-09-30 RX ADMIN — FAMOTIDINE 20 MG: 10 INJECTION INTRAVENOUS at 08:26

## 2020-09-30 RX ADMIN — Medication 10 ML: at 21:26

## 2020-09-30 RX ADMIN — FUROSEMIDE 40 MG: 10 INJECTION INTRAMUSCULAR; INTRAVENOUS at 08:26

## 2020-09-30 RX ADMIN — MORPHINE SULFATE 3 MG: 10 INJECTION INTRAVENOUS at 10:22

## 2020-09-30 RX ADMIN — INSULIN HUMAN 2 UNITS: 100 INJECTION, SOLUTION PARENTERAL at 21:24

## 2020-09-30 RX ADMIN — ROSUVASTATIN CALCIUM 40 MG: 20 TABLET, COATED ORAL at 21:15

## 2020-09-30 RX ADMIN — Medication 1 AMPULE: at 08:26

## 2020-09-30 RX ADMIN — MORPHINE SULFATE 2 MG: 10 INJECTION INTRAVENOUS at 04:47

## 2020-09-30 RX ADMIN — ASPIRIN 81 MG: 81 TABLET, CHEWABLE ORAL at 08:26

## 2020-09-30 RX ADMIN — LORAZEPAM 1 MG: 1 TABLET ORAL at 18:38

## 2020-09-30 RX ADMIN — OXYCODONE HYDROCHLORIDE AND ACETAMINOPHEN 1 TABLET: 5; 325 TABLET ORAL at 13:57

## 2020-09-30 RX ADMIN — FAMOTIDINE 20 MG: 10 INJECTION INTRAVENOUS at 21:15

## 2020-09-30 RX ADMIN — SODIUM CHLORIDE 5 UNITS/HR: 9 INJECTION, SOLUTION INTRAVENOUS at 03:47

## 2020-09-30 RX ADMIN — OXYCODONE HYDROCHLORIDE AND ACETAMINOPHEN 1 TABLET: 5; 325 TABLET ORAL at 02:58

## 2020-09-30 RX ADMIN — OXYCODONE HYDROCHLORIDE AND ACETAMINOPHEN 1 TABLET: 5; 325 TABLET ORAL at 10:08

## 2020-09-30 RX ADMIN — KETOROLAC TROMETHAMINE 30 MG: 30 INJECTION, SOLUTION INTRAMUSCULAR at 08:26

## 2020-09-30 RX ADMIN — TRAMADOL HYDROCHLORIDE 100 MG: 50 TABLET, FILM COATED ORAL at 17:07

## 2020-09-30 NOTE — PROGRESS NOTES
MCT and PCT removed at this time. Purse string sutures double tied, petroleum gauze, 4x4 and paper tape applied to site. Pt tolerated well.

## 2020-09-30 NOTE — PROGRESS NOTES
Respiratory Mechanics completed and are as follows:  Weaning Parameters  Spontaneous Breathing Trial Complete: Yes  Resp Rate Observed: 18  Ve: 8.5  VT: 480  RSBI: 38  NIF: -20  Dawn Agitation Sedation Scale (RASS): Alert and calm  Patient extubated to Airvo with 40 L flow and 40% FiO2. Patient is able to communicate and is negative for stridor. Breath sounds are clear and diminished. No complications with extubation.      Bruce Roblero, RT

## 2020-09-30 NOTE — PROGRESS NOTES
Physician Progress Note      PATIENTDagemma Donald  CSN #:                  [id number]  :                       1960  ADMIT DATE:       2020 5:22 AM  DISCH DATE:  RESPONDING  PROVIDER #:        Maxine FRAGA          QUERY TEXT:    Pt admitted for planned CABG and has CHF documented. If possible, please document in progress notes and discharge summary further specificity regarding the type and acuity of CHF:    The medical record reflects the following:  Risk Factors: 57 yo with CAD, HTN, HLD, paroxysmal Afib, CHF  Clinical Indicators: ECHO of 2020 w Ef 52-19%, LV Systolic function was normal  Treatment: po prn lasix at home, one dose IV Lasix here for hypoxia today. Thanks,  Nik Dean, RN, BSN, CDS  Clinical Documentation Management Program  [facility] AT [address]  [phone]  [email or web address]  Options provided:  -- Acute on Chronic Systolic CHF/HFrEF  -- Acute on Chronic Diastolic CHF/HFpEF  -- Acute Systolic CHF/HFrEF  -- Acute Diastolic CHF/HFpEF  -- Chronic Systolic CHF/HFrEF  -- Chronic Diastolic CHF/HFpEF  -- Other - I will add my own diagnosis  -- Disagree - Not applicable / Not valid  -- Disagree - Clinically unable to determine / Unknown  -- Refer to Clinical Documentation Reviewer    PROVIDER RESPONSE TEXT:    This patient has chronic diastolic CHF/HFpEF.     Query created by: Jonny Bundy on 2020 11:20 AM      Electronically signed by:  Maxine FRAGA 2020 12:54 PM

## 2020-09-30 NOTE — PROGRESS NOTES
Patient hemodynamically stable, delined per protocol. Daughter called with patient ID and was provided with update.

## 2020-09-30 NOTE — PROGRESS NOTES
Dimitris Rosado, RT at bedside, pt still requiring 15L O2 via cpap full face mask, O2 sat 90%. Pt diuresed 350cc s/p 40mg IV lasix and 30mg iv toradol given at 0830. Pt switched to airvo at 60L and 70%. O2 sat 93%.

## 2020-09-30 NOTE — PROGRESS NOTES
Kayla Daly  Admission Date: 9/29/2020             Daily Progress Note: 9/30/2020    The patient's chart is reviewed and the patient is discussed with the staff.     Patient is seen at the request of Dr. Mary Chand for respiratory management status post cardiac surgery. Patient had CABG x 4. Currently is sedated in CV-ICU and orally intubated receiving  mechanical ventilation. Pt [place] [facility] [place]18/[place] with chest pain and dyspnea. She was found to be in afib RVR. She was started on cardizem and admitted by cardiology. She underwent LHC by Dr. Richy Chavez which revealed MVCAD. Pt was seen by CT surgery and deemed appropriate for CABG. Subjective:     Extubated, CPAP overnight, uses at home. +CP as expected.  On 15L NC currently    Current Facility-Administered Medications   Medication Dose Route Frequency    alcohol 62% (NOZIN) nasal  1 Ampule  1 Ampule Topical Q12H    rosuvastatin (CRESTOR) tablet 40 mg  40 mg Oral QHS    dextrose 5% - 0.45% NaCl with KCl 20 mEq/L infusion  25 mL/hr IntraVENous CONTINUOUS    sodium chloride (NS) flush 5-40 mL  5-40 mL IntraVENous Q8H    sodium chloride (NS) flush 5-40 mL  5-40 mL IntraVENous PRN    oxyCODONE-acetaminophen (PERCOCET) 5-325 mg per tablet 1 Tab  1 Tab Oral Q4H PRN    morphine 10 mg/ml injection 3-5 mg  3-5 mg IntraVENous Q1H PRN    naloxone (NARCAN) injection 0.4 mg  0.4 mg IntraVENous PRN    mupirocin (BACTROBAN) 2 % ointment   Both Nostrils BID    sodium bicarbonate (8.4%) injection 50 mEq  50 mEq IntraVENous PRN    nitroglycerin (Tridil) 200 mcg/ml infusion   mcg/min IntraVENous TITRATE    amiodarone (CORDARONE) tablet 200 mg  200 mg Oral Q12H    metoprolol tartrate (LOPRESSOR) tablet 25 mg  25 mg Oral Q12H    ondansetron (ZOFRAN) injection 4 mg  4 mg IntraVENous Q4H PRN    insulin regular (NOVOLIN R, HUMULIN R) 100 Units in 0.9% sodium chloride 100 mL infusion  1 Units/hr IntraVENous TITRATE    dextrose (D50W) injection syrg 12.5 g  25 mL IntraVENous PRN    aspirin chewable tablet 81 mg  81 mg Oral DAILY    magnesium sulfate 1 g/100 ml IVPB (premix or compounded)  1 g IntraVENous PRN    potassium chloride 10 mEq in 50 ml IVPB  10 mEq IntraVENous PRN    tuberculin injection 5 Units  5 Units IntraDERMal ONCE    famotidine (PF) (PEPCID) 20 mg in 0.9% sodium chloride 10 mL injection  20 mg IntraVENous Q12H    EPINEPHrine (ADRENALIN) 4 mg in 0.9% sodium chloride 250 mL infusion (PRE-MIX)  0.01-0.05 mcg/kg/min IntraVENous TITRATE    acetaminophen (TYLENOL) tablet 650 mg  650 mg Oral Q4H PRN    niCARdipine in Saline (CARDENE) 25 MG/250 mL infusion kit  5-15 mg/hr IntraVENous TITRATE     Review of Systems  Constitutional: negative for fever, chills, sweats  Cardiovascular: negative for chest pain, palpitations, syncope, edema  Gastrointestinal:  negative for dysphagia, reflux, vomiting, diarrhea, abdominal pain, or melena  Neurologic:  negative for focal weakness, numbness, headache    Objective:     Vitals:    09/30/20 0429 09/30/20 0500 09/30/20 0501 09/30/20 0502   BP: 133/69   (!) 154/84   Pulse: 80 80 80 80   Resp: 25  20 11   Temp:       SpO2: 91% 94% 93% 93%   Weight:       Height:           Intake/Output Summary (Last 24 hours) at 9/30/2020 0512  Last data filed at 9/30/2020 0502  Gross per 24 hour   Intake 3001.83 ml   Output 3050 ml   Net -48.17 ml     Physical Exam:   Constitution:  the patient is well developed and in no acute distress  EENMT:  Sclera clear, pupils equal, oral mucosa moist  Respiratory: clear, diminished  Cardiovascular:  RRR without M,G,R  Gastrointestinal: soft and non-tender; with positive bowel sounds. Musculoskeletal: warm without cyanosis. There is no lower extremity edema.   Skin:  no jaundice or rashes, sternal wound   Neurologic: no gross neuro deficits     Psychiatric:  alert and oriented x 4    CXR: Extubation, LLL atx, R IJ      LAB  Recent Labs 09/30/20  0346 09/30/20  0244 09/30/20  0150 09/30/20  0042 09/29/20  2356   GLUCPOC 138* 139* 125* 125* 134*      Recent Labs     09/30/20  0239 09/29/20  2108 09/29/20  1707 09/29/20  1341 09/28/20  1055   WBC 17.2*  --   --  12.6*  --    HGB 12.1 12.5 13.0 11.9  --    HCT 38.8 39.4 41.0 35.8  --      --   --  147*  --    INR  --   --   --  1.3 1.0     Recent Labs     09/30/20 0239 09/29/20 2108 09/29/20  1707 09/29/20  1341     --   --  146*   K 4.4 4.2 4.1 4.1   *  --   --  113*   CO2 28  --   --  26   *  --   --  99   BUN 12  --   --  15   CREA 1.03*  --   --  1.11*   MG 2.3 2.5* 2.9* 3.7*   CA 7.1*  --   --  7.7*     Recent Labs     09/29/20  1733 09/29/20  1327 09/29/20  1232   PHI 7.34* 7.38 7.28*   PCO2I 44.3 39.6 47.3*   PO2I 90 80 137*   HCO3I 23.6 23.4 22.0     No results for input(s): LCAD, LAC in the last 72 hours.       Assessment:  (Medical Decision Making)     Hospital Problems  Date Reviewed: 9/22/2020          Codes Class Noted POA    Paroxysmal A-fib [facility]) ICD-10-CM: I48.0  ICD-9-CM: 427.31  9/29/2020 Unknown        Atherosclerosis of native coronary artery of native heart with unstable angina pectoris (HCC) ICD-10-CM: I25.110  ICD-9-CM: 414.01, 411.1  9/29/2020 Unknown        Encounter for weaning from ventilator [facility]) ICD-10-CM: Z99.11  ICD-9-CM: V46.13  9/29/2020 Unknown        Hypoxemia ICD-10-CM: R09.02  ICD-9-CM: 799.02  9/29/2020 Unknown        S/P CABG x 4 ICD-10-CM: Z95.1  ICD-9-CM: V45.81  9/29/2020 Unknown        Suspected sleep apnea ICD-10-CM: R29.818  ICD-9-CM: 781.99  9/29/2020 Unknown        CAD (coronary artery disease) ICD-10-CM: I25.10  ICD-9-CM: 414.00  9/29/2020 Unknown        Paroxysmal atrial fibrillation ([facility] [address] ICD-10-CM: I48.0  ICD-9-CM: 427.31  9/18/2020 Unknown              Plan:  (Medical Decision Making)     --mobilize, PT  --Wean O2  --CPAP QHS  --chest tubes per surgery  --Lasix per protocol    More than 50% of the time documented was spent in face-to-face contact with the patient and in the care of the patient on the floor/unit where the patient is located.     Ivet Ann MD

## 2020-10-01 ENCOUNTER — APPOINTMENT (OUTPATIENT)
Dept: GENERAL RADIOLOGY | Age: 60
DRG: 228 | End: 2020-10-01
Attending: THORACIC SURGERY (CARDIOTHORACIC VASCULAR SURGERY)
Payer: COMMERCIAL

## 2020-10-01 PROBLEM — J81.1 PULMONARY EDEMA: Status: ACTIVE | Noted: 2020-10-01

## 2020-10-01 PROBLEM — F41.9 ANXIETY: Status: ACTIVE | Noted: 2020-10-01

## 2020-10-01 LAB
ANION GAP SERPL CALC-SCNC: 5 MMOL/L (ref 7–16)
ARTERIAL PATENCY WRIST A: NO
ARTERIAL PATENCY WRIST A: YES
BASE DEFICIT BLD-SCNC: 2 MMOL/L
BASE DEFICIT BLD-SCNC: 2 MMOL/L
BDY SITE: ABNORMAL
BDY SITE: ABNORMAL
BUN SERPL-MCNC: 20 MG/DL (ref 8–23)
CALCIUM SERPL-MCNC: 7.6 MG/DL (ref 8.3–10.4)
CHLORIDE SERPL-SCNC: 105 MMOL/L (ref 98–107)
CO2 BLD-SCNC: 22 MMOL/L
CO2 BLD-SCNC: 23 MMOL/L
CO2 SERPL-SCNC: 26 MMOL/L (ref 21–32)
COLLECT TIME,HTIME: 240
COLLECT TIME,HTIME: 945
CREAT SERPL-MCNC: 1.27 MG/DL (ref 0.6–1)
ERYTHROCYTE [DISTWIDTH] IN BLOOD BY AUTOMATED COUNT: 15.7 % (ref 11.9–14.6)
FLOW RATE ISTAT,IFRATE: 60 L/MIN
GAS FLOW.O2 O2 DELIVERY SYS: ABNORMAL L/MIN
GAS FLOW.O2 O2 DELIVERY SYS: ABNORMAL L/MIN
GAS FLOW.O2 SETTING OXYMISER: 16 BPM
GLUCOSE BLD STRIP.AUTO-MCNC: 149 MG/DL (ref 65–100)
GLUCOSE BLD STRIP.AUTO-MCNC: 151 MG/DL (ref 65–100)
GLUCOSE BLD STRIP.AUTO-MCNC: 217 MG/DL (ref 65–100)
GLUCOSE BLD STRIP.AUTO-MCNC: 232 MG/DL (ref 65–100)
GLUCOSE SERPL-MCNC: 144 MG/DL (ref 65–100)
HCO3 BLD-SCNC: 21.5 MMOL/L (ref 22–26)
HCO3 BLD-SCNC: 21.7 MMOL/L (ref 22–26)
HCT VFR BLD AUTO: 37.8 % (ref 35.8–46.3)
HGB BLD-MCNC: 12.1 G/DL (ref 11.7–15.4)
MAGNESIUM SERPL-MCNC: 2.1 MG/DL (ref 1.8–2.4)
MCH RBC QN AUTO: 29.3 PG (ref 26.1–32.9)
MCHC RBC AUTO-ENTMCNC: 32 G/DL (ref 31.4–35)
MCV RBC AUTO: 91.5 FL (ref 79.6–97.8)
MM INDURATION POC: 0 MM (ref 0–5)
NRBC # BLD: 0 K/UL (ref 0–0.2)
O2/TOTAL GAS SETTING VFR VENT: 100 %
O2/TOTAL GAS SETTING VFR VENT: 70 %
PCO2 BLD: 32.2 MMHG (ref 35–45)
PCO2 BLD: 32.9 MMHG (ref 35–45)
PEEP RESPIRATORY: 8 CMH2O
PH BLD: 7.43 [PH] (ref 7.35–7.45)
PH BLD: 7.43 [PH] (ref 7.35–7.45)
PLATELET # BLD AUTO: 172 K/UL (ref 150–450)
PMV BLD AUTO: 11 FL (ref 9.4–12.3)
PO2 BLD: 46 MMHG (ref 75–100)
PO2 BLD: 59 MMHG (ref 75–100)
POTASSIUM SERPL-SCNC: 4.3 MMOL/L (ref 3.5–5.1)
PPD POC: NEGATIVE NEGATIVE
PRESSURE CONTROL, IPC: 12
RBC # BLD AUTO: 4.13 M/UL (ref 4.05–5.2)
SAO2 % BLD: 84 % (ref 95–98)
SAO2 % BLD: 91 % (ref 95–98)
SERVICE CMNT-IMP: ABNORMAL
SODIUM SERPL-SCNC: 136 MMOL/L (ref 136–145)
SPECIMEN TYPE: ABNORMAL
SPECIMEN TYPE: ABNORMAL
WBC # BLD AUTO: 24.2 K/UL (ref 4.3–11.1)

## 2020-10-01 PROCEDURE — C8929 TTE W OR WO FOL WCON,DOPPLER: HCPCS

## 2020-10-01 PROCEDURE — 74011636637 HC RX REV CODE- 636/637: Performed by: THORACIC SURGERY (CARDIOTHORACIC VASCULAR SURGERY)

## 2020-10-01 PROCEDURE — 94660 CPAP INITIATION&MGMT: CPT

## 2020-10-01 PROCEDURE — 82803 BLOOD GASES ANY COMBINATION: CPT

## 2020-10-01 PROCEDURE — 36600 WITHDRAWAL OF ARTERIAL BLOOD: CPT

## 2020-10-01 PROCEDURE — 74011250637 HC RX REV CODE- 250/637: Performed by: THORACIC SURGERY (CARDIOTHORACIC VASCULAR SURGERY)

## 2020-10-01 PROCEDURE — 65610000006 HC RM INTENSIVE CARE

## 2020-10-01 PROCEDURE — 74011250636 HC RX REV CODE- 250/636: Performed by: THORACIC SURGERY (CARDIOTHORACIC VASCULAR SURGERY)

## 2020-10-01 PROCEDURE — 99291 CRITICAL CARE FIRST HOUR: CPT | Performed by: INTERNAL MEDICINE

## 2020-10-01 PROCEDURE — 2709999900 HC NON-CHARGEABLE SUPPLY

## 2020-10-01 PROCEDURE — 74011250636 HC RX REV CODE- 250/636: Performed by: INTERNAL MEDICINE

## 2020-10-01 PROCEDURE — 71045 X-RAY EXAM CHEST 1 VIEW: CPT

## 2020-10-01 PROCEDURE — P9047 ALBUMIN (HUMAN), 25%, 50ML: HCPCS | Performed by: INTERNAL MEDICINE

## 2020-10-01 PROCEDURE — 82962 GLUCOSE BLOOD TEST: CPT

## 2020-10-01 PROCEDURE — 74011000250 HC RX REV CODE- 250: Performed by: THORACIC SURGERY (CARDIOTHORACIC VASCULAR SURGERY)

## 2020-10-01 PROCEDURE — 83735 ASSAY OF MAGNESIUM: CPT

## 2020-10-01 PROCEDURE — 74011250637 HC RX REV CODE- 250/637: Performed by: PHYSICIAN ASSISTANT

## 2020-10-01 PROCEDURE — 74011250636 HC RX REV CODE- 250/636

## 2020-10-01 PROCEDURE — 85027 COMPLETE CBC AUTOMATED: CPT

## 2020-10-01 PROCEDURE — 80048 BASIC METABOLIC PNL TOTAL CA: CPT

## 2020-10-01 RX ORDER — AMOXICILLIN 250 MG
2 CAPSULE ORAL 2 TIMES DAILY
Status: DISCONTINUED | OUTPATIENT
Start: 2020-10-01 | End: 2020-10-08

## 2020-10-01 RX ORDER — FUROSEMIDE 10 MG/ML
40 INJECTION INTRAMUSCULAR; INTRAVENOUS 2 TIMES DAILY
Status: DISCONTINUED | OUTPATIENT
Start: 2020-10-01 | End: 2020-10-02

## 2020-10-01 RX ORDER — LORAZEPAM 1 MG/1
0.5 TABLET ORAL ONCE
Status: ACTIVE | OUTPATIENT
Start: 2020-10-01 | End: 2020-10-01

## 2020-10-01 RX ORDER — ALBUMIN HUMAN 250 G/1000ML
25 SOLUTION INTRAVENOUS ONCE
Status: COMPLETED | OUTPATIENT
Start: 2020-10-01 | End: 2020-10-01

## 2020-10-01 RX ORDER — LORAZEPAM 2 MG/ML
INJECTION INTRAMUSCULAR
Status: COMPLETED
Start: 2020-10-01 | End: 2020-10-01

## 2020-10-01 RX ORDER — LORAZEPAM 2 MG/ML
1 INJECTION INTRAMUSCULAR
Status: DISCONTINUED | OUTPATIENT
Start: 2020-10-01 | End: 2020-10-09

## 2020-10-01 RX ADMIN — Medication 1 AMPULE: at 20:53

## 2020-10-01 RX ADMIN — METOPROLOL TARTRATE 25 MG: 25 TABLET, FILM COATED ORAL at 20:52

## 2020-10-01 RX ADMIN — DOCUSATE SODIUM 50 MG AND SENNOSIDES 8.6 MG 2 TABLET: 8.6; 5 TABLET, FILM COATED ORAL at 20:52

## 2020-10-01 RX ADMIN — TRAMADOL HYDROCHLORIDE 100 MG: 50 TABLET, FILM COATED ORAL at 13:15

## 2020-10-01 RX ADMIN — PERFLUTREN 1 ML: 6.52 INJECTION, SUSPENSION INTRAVENOUS at 10:00

## 2020-10-01 RX ADMIN — LORAZEPAM 1 MG: 1 TABLET ORAL at 12:13

## 2020-10-01 RX ADMIN — ALBUMIN (HUMAN) 25 G: 0.25 INJECTION, SOLUTION INTRAVENOUS at 04:50

## 2020-10-01 RX ADMIN — FAMOTIDINE 20 MG: 10 INJECTION INTRAVENOUS at 09:06

## 2020-10-01 RX ADMIN — LORAZEPAM 1 MG: 2 INJECTION INTRAMUSCULAR; INTRAVENOUS at 23:26

## 2020-10-01 RX ADMIN — SODIUM CHLORIDE 10 MG/HR: 900 INJECTION, SOLUTION INTRAVENOUS at 18:05

## 2020-10-01 RX ADMIN — SODIUM CHLORIDE 10 MG/HR: 900 INJECTION, SOLUTION INTRAVENOUS at 20:40

## 2020-10-01 RX ADMIN — OXYCODONE HYDROCHLORIDE AND ACETAMINOPHEN 1 TABLET: 5; 325 TABLET ORAL at 02:31

## 2020-10-01 RX ADMIN — Medication 1 AMPULE: at 09:06

## 2020-10-01 RX ADMIN — SODIUM CHLORIDE 5 MG/HR: 900 INJECTION, SOLUTION INTRAVENOUS at 10:06

## 2020-10-01 RX ADMIN — LORAZEPAM 1 MG: 1 TABLET ORAL at 17:58

## 2020-10-01 RX ADMIN — FUROSEMIDE 40 MG: 10 INJECTION INTRAMUSCULAR; INTRAVENOUS at 06:06

## 2020-10-01 RX ADMIN — Medication 10 ML: at 16:26

## 2020-10-01 RX ADMIN — FUROSEMIDE 40 MG: 10 INJECTION INTRAMUSCULAR; INTRAVENOUS at 17:35

## 2020-10-01 RX ADMIN — INSULIN HUMAN 4 UNITS: 100 INJECTION, SOLUTION PARENTERAL at 17:34

## 2020-10-01 RX ADMIN — Medication 30 ML: at 21:09

## 2020-10-01 RX ADMIN — Medication 10 ML: at 06:05

## 2020-10-01 RX ADMIN — SODIUM CHLORIDE 10 MG/HR: 900 INJECTION, SOLUTION INTRAVENOUS at 12:45

## 2020-10-01 RX ADMIN — ROSUVASTATIN CALCIUM 40 MG: 20 TABLET, COATED ORAL at 21:10

## 2020-10-01 RX ADMIN — FAMOTIDINE 20 MG: 10 INJECTION INTRAVENOUS at 20:53

## 2020-10-01 RX ADMIN — SODIUM CHLORIDE 10 MG/HR: 900 INJECTION, SOLUTION INTRAVENOUS at 16:00

## 2020-10-01 RX ADMIN — INSULIN HUMAN 4 UNITS: 100 INJECTION, SOLUTION PARENTERAL at 21:29

## 2020-10-01 RX ADMIN — ASPIRIN 81 MG: 81 TABLET, CHEWABLE ORAL at 09:06

## 2020-10-01 NOTE — PROGRESS NOTES
Bedside and Verbal shift change report received from Kristina Snyder [facility].

## 2020-10-01 NOTE — PROGRESS NOTES
Bedside verbal shift change report received from/given to Silvia Pollard . Report included the following information SBAR, Kardex, Procedure Summary, Intake/Output, MAR and Cardiac Rhythm Accel junctional.  Current drips verified and hemodynamics reviewed.

## 2020-10-01 NOTE — PROGRESS NOTES
Pt drowsy, but following commands, responding to voice and oriented to person upon am assessment. Pt now very lethargic with no command following. O2 sat 97% on 70% on bipap. VSS, pt has diuresed 350cc after 40mg iv lasix this am. Dr. Brennan Staley called and informed, stat repeat abg ordered. Arianna Lindsay, RT informed. 0935-Pt spontaneously alert, confused and agitated. Oriented to person and time, reoriented to place and situation and pt calming down. Following commands at this time.

## 2020-10-01 NOTE — PROGRESS NOTES
CV Progress Note    Subjective: Incisional pain:  moderate  Appetite:  poor  Activity:    mostly bedrest      Objective:     Vitals:  Blood pressure (!) 180/74, pulse (!) 101, temperature 97.5 °F (36.4 °C), resp. rate (!) 44, height 5' 3\" (1.6 m), weight 191 lb 12.8 oz (87 kg), SpO2 94 %. Temp (24hrs), Av.6 °F (36.4 °C), Min:97.5 °F (36.4 °C), Max:97.7 °F (36.5 °C)        Plan/Recommendations/Medical Decision Making:     Active Problems:    Paroxysmal atrial fibrillation (HCC) (2020)      Paroxysmal A-fib (HCC) (2020)      Atherosclerosis of native coronary artery of native heart with unstable angina pectoris ([facility] [address] (2020)      Encounter for weaning from ventilator ([facility] [address] (2020)      Hypoxemia (2020)      S/P CABG x 4 (2020)      CAD (coronary artery disease) (2020)      AMOR on CPAP (2020)      Pulmonary edema (10/1/2020)      Anxiety (10/1/2020)        Continue present treatment  some periods of confusion. Still requires high volumes O2  See orders for details. Taco Kaplan.  Fredi Goldberg MD

## 2020-10-01 NOTE — OP NOTES
[facility]  OPERATIVE REPORT    Name:  Doyle Whitney  MR#:  [id number]  :  1960  ACCOUNT #:  [de-identified]  DATE OF SERVICE:  2020    PREOPERATIVE DIAGNOSES:  Coronary artery occlusive disease, paroxysmal atrial fibrillation. POSTOPERATIVE DIAGNOSES:  Coronary artery occlusive disease, paroxysmal atrial fibrillation. PROCEDURE PERFORMED:  Four-vessel coronary artery bypass grafting using reverse saphenous vein graft to the distal posterior descending coronary, sequential saphenous vein graft to the obtuse marginal coronary and the posterolateral coronary and left internal mammary artery to the diagonal coronary; maze procedure with ablation of the right and left pulmonary veins; closure of the left atrial appendage with the AtriCure MitraClip; (left radial arterial line placed by Anesthesia); temporary right ventricular pacing wires. SURGEON:  Collette Borrego. Dusty Mayfield MD    ASSISTANT:       ANESTHESIA:  General.    CARDIOPULMONARY BYPASS TIME:  142 minutes. AORTIC CROSS-CLAMP TIME:  101 minutes. COMPLICATIONS:   .    SPECIMENS REMOVED:   .    IMPLANTS:   .    ESTIMATED BLOOD LOSS:  Minimal.    PREOPERATIVE HISTORY:  This is a 78-year-old lady who is admitted to the hospital with chest pain and atrial fibrillation with rapid ventricular response. She was cardioverted. Subsequent cardiac catheterization showed triple-vessel coronary disease, a small left anterior descending coronary with no disease. Surgical revascularization of the right circumflex and a large first diagonal recommended along with ablation for her paroxysmal atrial fibrillation. WHAT WAS FOUND/WHAT WAS DONE:  The heart was exposed through a median sternotomy. The heart was normal in size, contracted well. Right and left pulmonary vein ablation was carried out with the AtriCure radiofrequency clamp. The left atrial appendage was clipped.   Four coronaries were grafted, an individual graft was placed to the very distal posterior descending coronary, a sequential saphenous vein graft was placed first to the obtuse marginal coronary and then onto the posterolateral coronary and finally the internal mammary artery was placed to a large first diagonal coronary. The patient came off bypass without trouble. PROCEDURE IN DETAIL:  The patient was premedicated and brought to the operating room. The patient was placed supine on the table. Appropriate monitoring lines were placed. A radial artery catheter was placed with flow tract capabilities. General endotracheal anesthesia was given. Anterior body and both legs were prepped with Betadine. The patient was draped into a sterile field. Saphenous vein was taken from the lower leg. The vein was prepared and the skin of the leg was closed with subcuticular closure technique. The chest was opened in the midline. A sternotomy was carried out. The left pleural cavity was opened widely. The mammary artery was taken down. Next, the pericardium was opened vertically and retracted. Heparin at 300 units per kg was given. The patient was cannulated and placed on bypass. The aorta was cross-clamped. Blood cardioplegia was given antegrade. The posterolateral coronary from the circumflex was identified, opened for a 5-mm length and reverse vein was sutured to this with 7-0 Prolene. Adjacent to this was the obtuse marginal coronary. This coronary was opened for a 5-mm length and using the same vein segment, a side-to-side anastomosis was carried out. Further cardioplegia was given. Very distally on the posterior descending coronary, the vessel was suitable for grafting. It was opened for a 5-mm length. Reverse vein was sutured to this vessel with 7-0 Prolene. Finally, the internal mammary artery was placed to the large first diagonal coronary with a running 7-0 Prolene. Aortic cross-clamp was released and a partial clamp was placed on the ascending aorta.   Two 4-mm buttons of aortic wall were removed. The proximal end of both vein grafts were sutured to the aorta with 6-0 Prolene. The patient was then weaned from bypass without difficulty. It should be stated that prior to going on bypass in the first place with the patient on cardiopulmonary bypass, but before cross-clamping, ablation been carried out to both right and left pulmonary veins using the AtriCure clamp. There was also ablation of the base of the left atrial appendage followed by a clip of the base of the left atrial appendage. When coming off bypass, the patient was stable hemodynamically. All blood was returned to the patient after which cannulas were then removed. The pursestrings tied. Protamine was given to reverse the heparin. Temporary pacing wires had been placed on the right ventricle. 32-Lao tubes were left to drain the left chest cavity and the mediastinum. Sternum was then reapproximated with interrupted stainless steel wire. Soft tissue was closed with Vicryl and the skin was closed with Vicryl using subcuticular closure technique. The patient tolerated the procedure well, was moved to the intensive care in stable condition. Sponge, needle, and instrument counts were correct. MD BRIE [email or web address]  D:  10/01/2020 7:42  T:  10/01/2020 8:45  JOB #:  [id number]  CC:   [facility]

## 2020-10-01 NOTE — PROGRESS NOTES
Dr. Ginger Medeiros at bedside to assess pt. Switched mode from Cpap to BiPap for pt comfort. CXR with atelectasis. See eMAR for new orders. Pt resting in bed with no distress noted.

## 2020-10-01 NOTE — PROGRESS NOTES
Pt O2 sats ranging 85-95% and still requiring lots of O2. Pt wore CPAP most of night with 15L oxygen bled in, switched back to AirVo 60L/95% but sats remain 85-90%. Pt woke up very anxious, demanding for her daughter to be called. Daughter called by charge DENYS Astudillo and updated on pt condition. ABG, CXR done. ABG results:   ABG:    Lab Results   Component Value Date/Time    PHI 7.43 10/01/2020 02:42 AM    PCO2I 32.2 (L) 10/01/2020 02:42 AM    PO2I 46 (L) 10/01/2020 02:42 AM    HCO3I 21.5 (L) 10/01/2020 02:42 AM    FIO2I 100 10/01/2020 02:42 AM       RT at bedside and placed pt on CPAP. Percocet 5mg given for pain. O2 slightly improved.

## 2020-10-01 NOTE — PROGRESS NOTES
Karrie Daly  Admission Date: 9/29/2020             Daily Progress Note: 10/1/2020    The patient's chart is reviewed and the patient is discussed with the staff.     Patient is seen at the request of Dr. Jannette Hoffman for respiratory management status post cardiac surgery. Patient had CABG x 4. Currently is sedated in CV-ICU and orally intubated receiving  mechanical ventilation. Pt presented to the ER at [facility] [place] on 9/18/2020 with chest pain and dyspnea. She was found to be in afib RVR. She was started on cardizem and admitted by cardiology. 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 Pt was seen by CT surgery and deemed appropriate for CABG. Subjective:     Still on high oxygen and tonight on CPAP and 90%. Anxious. Urine output is about 750 ml. CXR just dose and worse pulmonary edema. She does use a CPAP at home without oxygen and is not sure of the pressure. Before I just saw her, she pulled off the mask and saturation at 83% right away. I put it back on her and adjusted it and changed from CPAP to BIPAP and saturation up to 96% and taper FIO2 to 70% and saturation at 93% and more comfortable now. Still having anxiety and did get 1 mg PO. She reports she takes benzo about 2 x per week about 1 mg of ativan.      Current Facility-Administered Medications   Medication Dose Route Frequency    traMADoL (ULTRAM) tablet 100 mg  100 mg Oral Q6H PRN    oxyCODONE-acetaminophen (PERCOCET) 5-325 mg per tablet 1 Tab  1 Tab Oral Q4H PRN    insulin regular (NOVOLIN R, HUMULIN R) injection   SubCUTAneous AC&HS    LORazepam (ATIVAN) tablet 1 mg  1 mg Oral Q6H PRN    alcohol 62% (NOZIN) nasal  1 Ampule  1 Ampule Topical Q12H    rosuvastatin (CRESTOR) tablet 40 mg  40 mg Oral QHS    dextrose 5% - 0.45% NaCl with KCl 20 mEq/L infusion  25 mL/hr IntraVENous CONTINUOUS    sodium chloride (NS) flush 5-40 mL  5-40 mL IntraVENous Q8H    sodium chloride (NS) flush 5-40 mL  5-40 mL IntraVENous PRN    naloxone (NARCAN) injection 0.4 mg  0.4 mg IntraVENous PRN    sodium bicarbonate (8.4%) injection 50 mEq  50 mEq IntraVENous PRN    nitroglycerin (Tridil) 200 mcg/ml infusion   mcg/min IntraVENous TITRATE    amiodarone (CORDARONE) tablet 200 mg  200 mg Oral Q12H    metoprolol tartrate (LOPRESSOR) tablet 25 mg  25 mg Oral Q12H    ondansetron (ZOFRAN) injection 4 mg  4 mg IntraVENous Q4H PRN    dextrose (D50W) injection syrg 12.5 g  25 mL IntraVENous PRN    aspirin chewable tablet 81 mg  81 mg Oral DAILY    magnesium sulfate 1 g/100 ml IVPB (premix or compounded)  1 g IntraVENous PRN    famotidine (PF) (PEPCID) 20 mg in 0.9% sodium chloride 10 mL injection  20 mg IntraVENous Q12H    EPINEPHrine (ADRENALIN) 4 mg in 0.9% sodium chloride 250 mL infusion (PRE-MIX)  0.01-0.05 mcg/kg/min IntraVENous TITRATE    acetaminophen (TYLENOL) tablet 650 mg  650 mg Oral Q4H PRN    niCARdipine in Saline (CARDENE) 25 MG/250 mL infusion kit  5-15 mg/hr IntraVENous TITRATE     Review of Systems  Constitutional: negative for fever, chills, sweats  Cardiovascular: negative for chest pain, palpitations, syncope, edema  Gastrointestinal:  negative for dysphagia, reflux, vomiting, diarrhea, abdominal pain, or melena  Neurologic:  negative for focal weakness, numbness, headache    Positive for:  Dyspnea, anxiety, agitation, restlessness.     Objective:     Vitals:    10/01/20 0130 10/01/20 0201 10/01/20 0257 10/01/20 0329   BP: (!) 155/67 (!) 140/63  (!) 154/67   Pulse: 70 73  75   Resp: 23 27  23   Temp:       SpO2: 94% 95% 94% 95%   Weight:       Height:           Intake/Output Summary (Last 24 hours) at 10/1/2020 0340  Last data filed at 9/30/2020 2259  Gross per 24 hour   Intake 554.28 ml   Output 1275 ml   Net -720.72 ml     Physical Exam:   Constitution:  the patient is well developed and in no acute distress  EENMT:  Sclera clear, pupils equal, oral mucosa moist  Respiratory: clear, diminished but no overt crackles  Cardiovascular:  RRR without M,G,R  Gastrointestinal: soft and non-tender; with positive bowel sounds. Musculoskeletal: warm without cyanosis. There is no lower extremity edema. Skin:  no jaundice or rashes, sternal wound   Neurologic: no gross neuro deficits     Psychiatric:  alert and oriented x 4    CXR: has worse edema on x-ray today          LAB  Recent Labs     09/30/20  2123 09/30/20  0730 09/30/20  0624 09/30/20  0543 09/30/20  0444   GLUCPOC 164* 116* 114* 132* 128*      Recent Labs     10/01/20  0250 09/30/20  0239 09/29/20 2108 09/29/20  1707 09/29/20  1341  09/28/20  1055   WBC 24.2* 17.2*  --   --  12.6*  --   --    HGB 12.1 12.1 12.5 13.0 11.9   < >  --    HCT 37.8 38.8 39.4 41.0 35.8   < >  --     170  --   --  147*  --   --    INR  --   --   --   --  1.3  --  1.0    < > = values in this interval not displayed. Recent Labs     10/01/20  0250 09/30/20  0239 09/29/20  2108 09/29/20  1707 09/29/20  1341    143  --   --  146*   K 4.3 4.4 4.2 4.1 4.1    113*  --   --  113*   CO2 26 28  --   --  26   * 128*  --   --  99   BUN 20 12  --   --  15   CREA 1.27* 1.03*  --   --  1.11*   MG  --  2.3 2.5* 2.9* 3.7*   CA 7.6* 7.1*  --   --  7.7*     Recent Labs     10/01/20  0242 09/29/20  1733 09/29/20  1327   PHI 7.43 7.34* 7.38   PCO2I 32.2* 44.3 39.6   PO2I 46* 90 80   HCO3I 21.5* 23.6 23.4     No results for input(s): LCAD, LAC in the last 72 hours.       Assessment:  (Medical Decision Making)     Hospital Problems  Date Reviewed: 9/22/2020          Codes Class Noted POA    AMOR on CPAP ICD-10-CM: G47.33, Z99.89  ICD-9-CM: 327.23, V46.8  9/30/2020 Unknown        Paroxysmal A-fib (HCC) ICD-10-CM: I48.0  ICD-9-CM: 427.31  9/29/2020 Unknown        Atherosclerosis of native coronary artery of native heart with unstable angina pectoris ([facility] [address] ICD-10-CM: I25.110  ICD-9-CM: 414.01, 411.1  9/29/2020 Unknown        Encounter for weaning from ventilator [facility]) ICD-10-CM: Z99.11  ICD-9-CM: V46.13  9/29/2020 Unknown        Hypoxemia ICD-10-CM: R09.02  ICD-9-CM: 799.02  9/29/2020 Unknown        S/P CABG x 4 ICD-10-CM: Z95.1  ICD-9-CM: V45.81  9/29/2020 Unknown        Suspected sleep apnea ICD-10-CM: R29.818  ICD-9-CM: 781.99  9/29/2020 Unknown        CAD (coronary artery disease) ICD-10-CM: I25.10  ICD-9-CM: 414.00  9/29/2020 Unknown        Paroxysmal atrial fibrillation (HCC) ICD-10-CM: I48.0  ICD-9-CM: 427.31  9/18/2020 Unknown            Patinet with CAD/AFIB/AMOR on home CPAP/anxiety issues s/p CABG x4. CXR noted going to pulmonary edema. Anxiety is an issue now  Plan:  (Medical Decision Making)     --change from CPAP to BIPAP as per above and tolerating. On 70% and now 12/8. Continue to taper as tolerated. Note she has a CPAP at home -- she does not know the pressure and family can bring it in. Hold off using till off oxygen, otherwise may need oxygen adapter  --has anxiety and can given additional 0.5 mg PO if she is still anxious in 30 minutes. Told to turn off lights and let patient goto sleep. --start lasix 40 IV q12 but if urine output tapers, may need lasix drip. Other cardiac meds per CT surgery. Consider cardiology consult if feel appropriate. --chest tubes per surgery  --hold off PT and mobilizing patient since oxygenation improves. --f/u WBC since going up. No fevers and likely reactive. If higher tomorrow or has fevers then culture and start abx.   --continue remaining treatment. All questions answered for the patient. Care discussed with nursing/respiratory and CVICU charge nurse. Condition is critical  Time spent with care is 33 minutes. More than 50% of the time documented was spent in face-to-face contact with the patient and in the care of the patient on the floor/unit where the patient is located.     Nguyen Lanza MD

## 2020-10-01 NOTE — PROGRESS NOTES
Dr Doug Deal at bedside and updated,aware that [address] now at 100%. He wants her moving, not to stay in 45degree position, highfowlers, bed in chair position, 80 degree turning left and right. Pt more alert and appropriate, continues to apologize. Reassure and explain all. To chair position in bed and dillan well.

## 2020-10-01 NOTE — PROGRESS NOTES
A follow up visit was made to the patient. Emotional support, spiritual presence and   prayer were provided for the patient. She was on the bi pap and not alert.       LIV Cowan

## 2020-10-02 ENCOUNTER — APPOINTMENT (OUTPATIENT)
Dept: GENERAL RADIOLOGY | Age: 60
DRG: 228 | End: 2020-10-02
Attending: THORACIC SURGERY (CARDIOTHORACIC VASCULAR SURGERY)
Payer: COMMERCIAL

## 2020-10-02 ENCOUNTER — APPOINTMENT (OUTPATIENT)
Dept: GENERAL RADIOLOGY | Age: 60
DRG: 228 | End: 2020-10-02
Attending: INTERNAL MEDICINE
Payer: COMMERCIAL

## 2020-10-02 PROBLEM — J96.01 ACUTE RESPIRATORY FAILURE WITH HYPOXIA (HCC): Status: ACTIVE | Noted: 2020-10-02

## 2020-10-02 PROBLEM — Z99.11 ENCOUNTER FOR WEANING FROM VENTILATOR (HCC): Status: RESOLVED | Noted: 2020-09-29 | Resolved: 2020-10-02

## 2020-10-02 PROBLEM — R91.8 BILATERAL PULMONARY INFILTRATES ON CHEST X-RAY: Status: ACTIVE | Noted: 2020-10-02

## 2020-10-02 LAB
ABO + RH BLD: NORMAL
ANION GAP SERPL CALC-SCNC: 7 MMOL/L (ref 7–16)
ARTERIAL PATENCY WRIST A: YES
ARTERIAL PATENCY WRIST A: YES
BASE DEFICIT BLD-SCNC: 2 MMOL/L
BASE DEFICIT BLD-SCNC: 4 MMOL/L
BDY SITE: ABNORMAL
BDY SITE: ABNORMAL
BLD PROD TYP BPU: NORMAL
BLD PROD TYP BPU: NORMAL
BLOOD GROUP ANTIBODIES SERPL: NORMAL
BPU ID: NORMAL
BPU ID: NORMAL
BRONCH. LAVAGE DIFF.,BR: NORMAL
BUN SERPL-MCNC: 25 MG/DL (ref 8–23)
CALCIUM SERPL-MCNC: 7.5 MG/DL (ref 8.3–10.4)
CHLORIDE SERPL-SCNC: 104 MMOL/L (ref 98–107)
CO2 BLD-SCNC: 20 MMOL/L
CO2 BLD-SCNC: 20 MMOL/L
CO2 SERPL-SCNC: 27 MMOL/L (ref 21–32)
COLLECT TIME,HTIME: 1040
COLLECT TIME,HTIME: 210
CREAT SERPL-MCNC: 1.32 MG/DL (ref 0.6–1)
CROSSMATCH RESULT,%XM: NORMAL
CROSSMATCH RESULT,%XM: NORMAL
EOSINOPHIL NFR BRONCH MANUAL: 0 %
ERYTHROCYTE [DISTWIDTH] IN BLOOD BY AUTOMATED COUNT: 15.1 % (ref 11.9–14.6)
EXHALED MINUTE VOLUME, VE: 10.6 L/MIN
EXHALED MINUTE VOLUME, VE: 26 L/MIN
GAS FLOW.O2 O2 DELIVERY SYS: ABNORMAL L/MIN
GAS FLOW.O2 O2 DELIVERY SYS: ABNORMAL L/MIN
GAS FLOW.O2 SETTING OXYMISER: 16 BPM
GAS FLOW.O2 SETTING OXYMISER: 22 BPM
GLUCOSE BLD STRIP.AUTO-MCNC: 129 MG/DL (ref 65–100)
GLUCOSE BLD STRIP.AUTO-MCNC: 129 MG/DL (ref 65–100)
GLUCOSE BLD STRIP.AUTO-MCNC: 145 MG/DL (ref 65–100)
GLUCOSE BLD STRIP.AUTO-MCNC: 153 MG/DL (ref 65–100)
GLUCOSE SERPL-MCNC: 153 MG/DL (ref 65–100)
HCO3 BLD-SCNC: 19.2 MMOL/L (ref 22–26)
HCO3 BLD-SCNC: 19.7 MMOL/L (ref 22–26)
HCT VFR BLD AUTO: 31.5 % (ref 35.8–46.3)
HGB BLD-MCNC: 10.7 G/DL (ref 11.7–15.4)
INSPIRATION.DURATION SETTING TIME VENT: 0.95 SEC
LYMPHOCYTES NFR BRONCH MANUAL: 7 %
MACROPHAGES NFR BRONCH MANUAL: 8 %
MAGNESIUM SERPL-MCNC: 2.2 MG/DL (ref 1.8–2.4)
MCH RBC QN AUTO: 30.1 PG (ref 26.1–32.9)
MCHC RBC AUTO-ENTMCNC: 34 G/DL (ref 31.4–35)
MCV RBC AUTO: 88.5 FL (ref 79.6–97.8)
NEUTROPHILS NFR BRONCH MANUAL: 85 %
NRBC # BLD: 0 K/UL (ref 0–0.2)
O2/TOTAL GAS SETTING VFR VENT: 100 %
O2/TOTAL GAS SETTING VFR VENT: 100 %
PCO2 BLD: 25.2 MMHG (ref 35–45)
PCO2 BLD: 28.3 MMHG (ref 35–45)
PEEP RESPIRATORY: 12 CMH2O
PEEP RESPIRATORY: 14 CMH2O
PH BLD: 7.44 [PH] (ref 7.35–7.45)
PH BLD: 7.5 [PH] (ref 7.35–7.45)
PIP ISTAT,IPIP: 22
PLATELET # BLD AUTO: 149 K/UL (ref 150–450)
PMV BLD AUTO: 11.5 FL (ref 9.4–12.3)
PO2 BLD: 61 MMHG (ref 75–100)
PO2 BLD: 81 MMHG (ref 75–100)
POTASSIUM SERPL-SCNC: 3.7 MMOL/L (ref 3.5–5.1)
PRESSURE CONTROL, IPC: 2
PROCALCITONIN SERPL-MCNC: 6.14 NG/ML
RBC # BLD AUTO: 3.56 M/UL (ref 4.05–5.2)
SAO2 % BLD: 94 % (ref 95–98)
SAO2 % BLD: 97 % (ref 95–98)
SERVICE CMNT-IMP: ABNORMAL
SODIUM SERPL-SCNC: 138 MMOL/L (ref 136–145)
SPECIMEN EXP DATE BLD: NORMAL
SPECIMEN TYPE: ABNORMAL
SPECIMEN TYPE: ABNORMAL
SPONTANEOUS TIMED, IST: 16
STATUS OF UNIT,%ST: NORMAL
STATUS OF UNIT,%ST: NORMAL
UNIT DIVISION, %UDIV: 0
UNIT DIVISION, %UDIV: 0
VENTILATION MODE VENT: ABNORMAL
VT SETTING VENT: 500 ML
WBC # BLD AUTO: 18.4 K/UL (ref 4.3–11.1)

## 2020-10-02 PROCEDURE — 99291 CRITICAL CARE FIRST HOUR: CPT | Performed by: INTERNAL MEDICINE

## 2020-10-02 PROCEDURE — 36415 COLL VENOUS BLD VENIPUNCTURE: CPT

## 2020-10-02 PROCEDURE — 0BH17EZ INSERTION OF ENDOTRACHEAL AIRWAY INTO TRACHEA, VIA NATURAL OR ARTIFICIAL OPENING: ICD-10-PCS | Performed by: INTERNAL MEDICINE

## 2020-10-02 PROCEDURE — 71045 X-RAY EXAM CHEST 1 VIEW: CPT

## 2020-10-02 PROCEDURE — 77030034850

## 2020-10-02 PROCEDURE — 87086 URINE CULTURE/COLONY COUNT: CPT

## 2020-10-02 PROCEDURE — 89051 BODY FLUID CELL COUNT: CPT

## 2020-10-02 PROCEDURE — 87040 BLOOD CULTURE FOR BACTERIA: CPT

## 2020-10-02 PROCEDURE — 31624 DX BRONCHOSCOPE/LAVAGE: CPT | Performed by: INTERNAL MEDICINE

## 2020-10-02 PROCEDURE — 74011636637 HC RX REV CODE- 636/637: Performed by: THORACIC SURGERY (CARDIOTHORACIC VASCULAR SURGERY)

## 2020-10-02 PROCEDURE — 94660 CPAP INITIATION&MGMT: CPT

## 2020-10-02 PROCEDURE — 74011000250 HC RX REV CODE- 250: Performed by: INTERNAL MEDICINE

## 2020-10-02 PROCEDURE — 77030040361 HC SLV COMPR DVT MDII -B

## 2020-10-02 PROCEDURE — 74011000250 HC RX REV CODE- 250

## 2020-10-02 PROCEDURE — 74011250636 HC RX REV CODE- 250/636: Performed by: INTERNAL MEDICINE

## 2020-10-02 PROCEDURE — 77030012699 HC VLV SUC CNTRL OCOA -A: Performed by: INTERNAL MEDICINE

## 2020-10-02 PROCEDURE — 84145 PROCALCITONIN (PCT): CPT

## 2020-10-02 PROCEDURE — 74011250637 HC RX REV CODE- 250/637: Performed by: INTERNAL MEDICINE

## 2020-10-02 PROCEDURE — 87070 CULTURE OTHR SPECIMN AEROBIC: CPT

## 2020-10-02 PROCEDURE — 0B9D8ZX DRAINAGE OF RIGHT MIDDLE LUNG LOBE, VIA NATURAL OR ARTIFICIAL OPENING ENDOSCOPIC, DIAGNOSTIC: ICD-10-PCS | Performed by: INTERNAL MEDICINE

## 2020-10-02 PROCEDURE — 85027 COMPLETE CBC AUTOMATED: CPT

## 2020-10-02 PROCEDURE — 77030012890

## 2020-10-02 PROCEDURE — 2709999900 HC NON-CHARGEABLE SUPPLY: Performed by: INTERNAL MEDICINE

## 2020-10-02 PROCEDURE — 74011000258 HC RX REV CODE- 258: Performed by: INTERNAL MEDICINE

## 2020-10-02 PROCEDURE — 31500 INSERT EMERGENCY AIRWAY: CPT | Performed by: INTERNAL MEDICINE

## 2020-10-02 PROCEDURE — 74011250636 HC RX REV CODE- 250/636

## 2020-10-02 PROCEDURE — 94002 VENT MGMT INPAT INIT DAY: CPT

## 2020-10-02 PROCEDURE — 5A1955Z RESPIRATORY VENTILATION, GREATER THAN 96 CONSECUTIVE HOURS: ICD-10-PCS | Performed by: INTERNAL MEDICINE

## 2020-10-02 PROCEDURE — 80048 BASIC METABOLIC PNL TOTAL CA: CPT

## 2020-10-02 PROCEDURE — 74011250637 HC RX REV CODE- 250/637: Performed by: THORACIC SURGERY (CARDIOTHORACIC VASCULAR SURGERY)

## 2020-10-02 PROCEDURE — 36592 COLLECT BLOOD FROM PICC: CPT

## 2020-10-02 PROCEDURE — 74011250637 HC RX REV CODE- 250/637: Performed by: PHYSICIAN ASSISTANT

## 2020-10-02 PROCEDURE — 83735 ASSAY OF MAGNESIUM: CPT

## 2020-10-02 PROCEDURE — 87486 CHLMYD PNEUM DNA AMP PROBE: CPT

## 2020-10-02 PROCEDURE — 36600 WITHDRAWAL OF ARTERIAL BLOOD: CPT

## 2020-10-02 PROCEDURE — 87102 FUNGUS ISOLATION CULTURE: CPT

## 2020-10-02 PROCEDURE — 87541 LEGION PNEUMO DNA AMP PROB: CPT

## 2020-10-02 PROCEDURE — 87633 RESP VIRUS 12-25 TARGETS: CPT

## 2020-10-02 PROCEDURE — C9113 INJ PANTOPRAZOLE SODIUM, VIA: HCPCS | Performed by: INTERNAL MEDICINE

## 2020-10-02 PROCEDURE — 77030018798 HC PMP KT ENTRL FED COVD -A

## 2020-10-02 PROCEDURE — 99152 MOD SED SAME PHYS/QHP 5/>YRS: CPT | Performed by: INTERNAL MEDICINE

## 2020-10-02 PROCEDURE — 2709999900 HC NON-CHARGEABLE SUPPLY

## 2020-10-02 PROCEDURE — 99153 MOD SED SAME PHYS/QHP EA: CPT | Performed by: INTERNAL MEDICINE

## 2020-10-02 PROCEDURE — 82803 BLOOD GASES ANY COMBINATION: CPT

## 2020-10-02 PROCEDURE — 82962 GLUCOSE BLOOD TEST: CPT

## 2020-10-02 PROCEDURE — 74011250636 HC RX REV CODE- 250/636: Performed by: THORACIC SURGERY (CARDIOTHORACIC VASCULAR SURGERY)

## 2020-10-02 PROCEDURE — 76040000025: Performed by: INTERNAL MEDICINE

## 2020-10-02 PROCEDURE — 74011000258 HC RX REV CODE- 258: Performed by: THORACIC SURGERY (CARDIOTHORACIC VASCULAR SURGERY)

## 2020-10-02 PROCEDURE — 87206 SMEAR FLUORESCENT/ACID STAI: CPT

## 2020-10-02 PROCEDURE — 65610000006 HC RM INTENSIVE CARE

## 2020-10-02 RX ORDER — MIDAZOLAM IN 0.9 % SOD.CHLORID 1 MG/ML
0-10 PLASTIC BAG, INJECTION (ML) INTRAVENOUS
Status: DISCONTINUED | OUTPATIENT
Start: 2020-10-02 | End: 2020-10-08

## 2020-10-02 RX ORDER — NOREPINEPHRINE BITARTRATE/D5W 4MG/250ML
.5-16 PLASTIC BAG, INJECTION (ML) INTRAVENOUS
Status: DISCONTINUED | OUTPATIENT
Start: 2020-10-02 | End: 2020-10-08

## 2020-10-02 RX ORDER — FENTANYL CITRATE-0.9 % NACL/PF 25 MCG/ML
0-200 PLASTIC BAG, INJECTION (ML) INJECTION
Status: DISCONTINUED | OUTPATIENT
Start: 2020-10-02 | End: 2020-10-08

## 2020-10-02 RX ORDER — ETOMIDATE 2 MG/ML
40 INJECTION INTRAVENOUS ONCE
Status: COMPLETED | OUTPATIENT
Start: 2020-10-02 | End: 2020-10-02

## 2020-10-02 RX ORDER — ETOMIDATE 2 MG/ML
INJECTION INTRAVENOUS
Status: COMPLETED
Start: 2020-10-02 | End: 2020-10-02

## 2020-10-02 RX ORDER — FENTANYL CITRATE 50 UG/ML
100 INJECTION, SOLUTION INTRAMUSCULAR; INTRAVENOUS ONCE
Status: COMPLETED | OUTPATIENT
Start: 2020-10-02 | End: 2020-10-02

## 2020-10-02 RX ORDER — SUCCINYLCHOLINE CHLORIDE 20 MG/ML INJECTION SOLUTION
100 SOLUTION
Status: COMPLETED | OUTPATIENT
Start: 2020-10-02 | End: 2020-10-02

## 2020-10-02 RX ORDER — SODIUM CHLORIDE 0.9 % (FLUSH) 0.9 %
5-40 SYRINGE (ML) INJECTION AS NEEDED
Status: DISCONTINUED | OUTPATIENT
Start: 2020-10-02 | End: 2020-10-08

## 2020-10-02 RX ORDER — LORAZEPAM 1 MG/1
1 TABLET ORAL
Status: DISCONTINUED | OUTPATIENT
Start: 2020-10-02 | End: 2020-10-09

## 2020-10-02 RX ORDER — FENTANYL CITRATE 50 UG/ML
INJECTION, SOLUTION INTRAMUSCULAR; INTRAVENOUS
Status: ACTIVE
Start: 2020-10-02 | End: 2020-10-02

## 2020-10-02 RX ORDER — PANTOPRAZOLE SODIUM 40 MG/10ML
40 INJECTION, POWDER, LYOPHILIZED, FOR SOLUTION INTRAVENOUS EVERY 24 HOURS
Status: DISCONTINUED | OUTPATIENT
Start: 2020-10-02 | End: 2020-10-02

## 2020-10-02 RX ORDER — FUROSEMIDE 10 MG/ML
60 INJECTION INTRAMUSCULAR; INTRAVENOUS 2 TIMES DAILY
Status: DISCONTINUED | OUTPATIENT
Start: 2020-10-02 | End: 2020-10-03

## 2020-10-02 RX ORDER — MIDAZOLAM HYDROCHLORIDE 1 MG/ML
2 INJECTION, SOLUTION INTRAMUSCULAR; INTRAVENOUS
Status: DISCONTINUED | OUTPATIENT
Start: 2020-10-02 | End: 2020-10-09

## 2020-10-02 RX ORDER — LIDOCAINE HYDROCHLORIDE 40 MG/ML
SOLUTION TOPICAL
Status: COMPLETED | OUTPATIENT
Start: 2020-10-02 | End: 2020-10-02

## 2020-10-02 RX ORDER — SUCCINYLCHOLINE CHLORIDE 20 MG/ML INJECTION SOLUTION
SOLUTION
Status: COMPLETED
Start: 2020-10-02 | End: 2020-10-02

## 2020-10-02 RX ORDER — SODIUM CHLORIDE 0.9 % (FLUSH) 0.9 %
5-40 SYRINGE (ML) INJECTION EVERY 8 HOURS
Status: DISCONTINUED | OUTPATIENT
Start: 2020-10-02 | End: 2020-10-08

## 2020-10-02 RX ADMIN — ETOMIDATE 40 MG: 2 INJECTION, SOLUTION INTRAVENOUS at 09:25

## 2020-10-02 RX ADMIN — DOCUSATE SODIUM 50 MG AND SENNOSIDES 8.6 MG 2 TABLET: 8.6; 5 TABLET, FILM COATED ORAL at 21:22

## 2020-10-02 RX ADMIN — Medication 10 ML: at 15:00

## 2020-10-02 RX ADMIN — ROSUVASTATIN CALCIUM 40 MG: 20 TABLET, COATED ORAL at 21:22

## 2020-10-02 RX ADMIN — DEXTROSE MONOHYDRATE 2 MCG/MIN: 50 INJECTION, SOLUTION INTRAVENOUS at 09:40

## 2020-10-02 RX ADMIN — Medication 20 ML: at 21:22

## 2020-10-02 RX ADMIN — Medication 10 ML: at 07:04

## 2020-10-02 RX ADMIN — LORAZEPAM 1 MG: 1 TABLET ORAL at 08:13

## 2020-10-02 RX ADMIN — INSULIN HUMAN 2 UNITS: 100 INJECTION, SOLUTION PARENTERAL at 07:57

## 2020-10-02 RX ADMIN — TRAMADOL HYDROCHLORIDE 100 MG: 50 TABLET, FILM COATED ORAL at 08:13

## 2020-10-02 RX ADMIN — Medication 10 ML: at 21:22

## 2020-10-02 RX ADMIN — SODIUM CHLORIDE 50 MCG/HR: 900 INJECTION, SOLUTION INTRAVENOUS at 11:22

## 2020-10-02 RX ADMIN — DOCUSATE SODIUM 50 MG AND SENNOSIDES 8.6 MG 2 TABLET: 8.6; 5 TABLET, FILM COATED ORAL at 08:13

## 2020-10-02 RX ADMIN — SUCCINYLCHOLINE CHLORIDE 20 MG/ML INJECTION SOLUTION 100 MG: SOLUTION at 09:26

## 2020-10-02 RX ADMIN — FUROSEMIDE 60 MG: 10 INJECTION INTRAMUSCULAR; INTRAVENOUS at 17:58

## 2020-10-02 RX ADMIN — MIDAZOLAM 2 MG: 1 INJECTION INTRAMUSCULAR; INTRAVENOUS at 12:15

## 2020-10-02 RX ADMIN — Medication 100 MG: at 09:26

## 2020-10-02 RX ADMIN — Medication 1 AMPULE: at 21:22

## 2020-10-02 RX ADMIN — PANTOPRAZOLE SODIUM 40 MG: 40 INJECTION, POWDER, FOR SOLUTION INTRAVENOUS at 08:13

## 2020-10-02 RX ADMIN — CEFTRIAXONE 2 G: 2 INJECTION, POWDER, FOR SOLUTION INTRAMUSCULAR; INTRAVENOUS at 09:06

## 2020-10-02 RX ADMIN — FENTANYL CITRATE 50 MCG: 50 INJECTION, SOLUTION INTRAMUSCULAR; INTRAVENOUS at 09:22

## 2020-10-02 RX ADMIN — SODIUM CHLORIDE 500 ML: 900 INJECTION, SOLUTION INTRAVENOUS at 09:35

## 2020-10-02 RX ADMIN — SODIUM CHLORIDE 0.2 MCG/KG/HR: 900 INJECTION, SOLUTION INTRAVENOUS at 00:47

## 2020-10-02 RX ADMIN — Medication 1 AMPULE: at 08:13

## 2020-10-02 RX ADMIN — ASPIRIN 81 MG: 81 TABLET, CHEWABLE ORAL at 08:13

## 2020-10-02 RX ADMIN — FUROSEMIDE 60 MG: 10 INJECTION INTRAMUSCULAR; INTRAVENOUS at 08:14

## 2020-10-02 RX ADMIN — SODIUM CHLORIDE 1 MG/HR: 900 INJECTION, SOLUTION INTRAVENOUS at 15:09

## 2020-10-02 RX ADMIN — ETOMIDATE 40 MG: 2 INJECTION INTRAVENOUS at 09:25

## 2020-10-02 RX ADMIN — LIDOCAINE HYDROCHLORIDE: 40 SOLUTION TOPICAL at 12:15

## 2020-10-02 NOTE — PROGRESS NOTES
Physician Progress Note      Tequila Treviño  CSN #:                  [id number]  :                       1960  ADMIT DATE:       2020 5:22 AM  DISCH DATE:  RESPONDING  PROVIDER #:        Dmitriy Srivastava MD          QUERY TEXT:    Pt admitted for CABG. Pt noted to have pulmonary edema. If possible, please document in the progress notes and discharge summary if you are evaluating and/or treating any of the following: The medical record reflects the following:  Risk Factors: s/p CABG x4, afib, AMOR  Clinical Indicators: hypoxic on abg 7.43/33/59/22, CXR Interval development of pulmonary edema. Treatment: Lasix, increasing O2.     Thank you,  Nacho Deal RN CDS  [phone]  Options provided:  -- Acute pulmonary edema due to heart disease  -- Acute pulmonary edema due to heart failure  -- Chronic pulmonary edema due to heart disease  -- Chronic pulmonary edema due to heart failure  -- Noncardiogenic acute pulmonary edema due to ***  -- Other - I will add my own diagnosis  -- Disagree - Not applicable / Not valid  -- Disagree - Clinically unable to determine / Unknown  -- Refer to Clinical Documentation Reviewer    PROVIDER RESPONSE TEXT:    This patient has acute pulmonary edema due to heart failure    Query created by: Birgit Rodriguez on 10/1/2020 12:52 PM      Electronically signed by:  Dmitriy Srivastava MD 10/2/2020 5:49 AM

## 2020-10-02 NOTE — PROGRESS NOTES
CM continues to follow for discharge planning and/or CM needs. Per chart review, no CM needs noted at this time. Pt currently re-intubated. Will continue to monitor and update as needed.

## 2020-10-02 NOTE — PROCEDURES
PROCEDURE    Bronchoscopy with airway inspection /BAL. INDICATION   Acute respiratory failure with hypoxia and B pulmonary infiltrates. EQUIPMENT:  Olympus Q 180 Bronchoscope    ANESTHESIA    Concious sedation with: Fentanyl drip - see ICU records. Versed 2mg IV; Lidocaine 100 mg to tracheo-bronchial tree and vocal cords    IMAGING:  CXR 10/2/2020        AIRWAY INSPECTION    After obtaining informed consent, through an endotracheal tube, an Olympus Q 180 Bronchoscope was introduced into the trachea without complication. RIGHT    LOCATION NORM/ABNORM DESCRIPTION   Larynx ETT    VOCAL CORDS ETT    TRACHEA NL    LESLEY NL    RMSB NL    RUL NL    BI NL    RML NL    RLL NL    SUP SEGM RLL NL    MED BASAL NL    ANTERIOR BASAL NL    LATERAL BASAL NL    POSTERIOR BASAL NL               LEFT    LOCATION NORM/ABNORM DESCRIPTION   LMSB NL    EL NL    LINGULA NL    LLL NL    SUPERIOR SEG LLL NL    JIA-MEDIAL LLL NL    LATERAL LLL NL    POSTERIOR LLL NL      There were little to no secretions seen throughout the airways. The following samples were obtained:    BAL: RML    Samples were sent for:  Cell count, diff, routine culture, afb, fungus, atypical pneumonia, legionella, respiratory viral PCR. The procedure was completed without complication and the patient tolerated the procedure well. EBL: None    Recommendations: Follow up the above studies to help determine if the patient's pulmonary infiltrates are due to infection or bland pulmonary edema. There were no signs of alveolar hemorrhage on 3 subsequent aliquants of fluid returned.      Brady Ramos MD

## 2020-10-02 NOTE — PROCEDURES
Procedure: Emergent intubation (CPT 99469)    Indication: acute respiratory failure with hypoxia    Anesthesia:  Fentanyl 50mcg, Etomidate 40mg, Succinylcholine 100mg     After assessing the airway, the patient underwent preoxygenation with 100% FiO2 for 5 min. Fentanyl and etomidate were given sequentially. After adequate sedation intubation was performed. A 3.0 Glidescope blade laryngoscope was used to visualize the epiglottis and vocal chords. After positive identification of the vocal chords, a 7.5 ET tube was placed into the trachea with direct visualization. The tube was seen passing through the vocal chords without difficulty. CO2 colorimetry was employed immediately to verify tube in airway with appropriate color change indicating detection of CO2. Water vapor was seen within the ET tube, and auscultation of the abdomen revealed no bubbling sounds. Auscultation and inspection of the chest after intubation showed symmetric chest excursion and symmetric air entry bilaterally. The tube was secured at 23 cm at the lip. Chest X-ray has been ordered and is pending. The patient has been placed on a mechanical ventilator. There were no complications.     Karuna Gilbert MD

## 2020-10-02 NOTE — PROGRESS NOTES
CV Progress Note    Admit Date: 9/29/2020    POD 1    Subjective:     Patient present conditions: Awake, Alert and Cooperative. Review of Systems   Cardiac: Vital signs stable. Lines out  Respiratory: Chest x-ray congested. Minimal chest tube drainage. intubated  Neuro: Moves all four extremities. Incision: Dry. GI: Taking liquids. Objective:     Vitals:  Blood pressure (!) 103/51, pulse (!) 55, temperature 98 °F (36.7 °C), resp. rate 21, height 5' 3\" (1.6 m), weight 188 lb 4.4 oz (85.4 kg), SpO2 96 %. I/O:  No intake/output data recorded. 09/30 1901 - 10/02 0700  In: 1408.5 [P.O.:60; I.V.:1348.5]  Out: 2680 [Urine:2680]    Heart: No Murmur. Lung: Working with IS. Neuro: Cooperative. Incisions: Dry.     ECG/Telemetry: Unchanged from Pre-Op    Labs:  Recent Results (from the past 12 hour(s))   POC G3    Collection Time: 10/02/20  2:18 AM   Result Value Ref Range    Device: BIPAP      FIO2 (POC) 100 %    pH (POC) 7.50 (H) 7.35 - 7.45      pCO2 (POC) 25.2 (L) 35 - 45 MMHG    pO2 (POC) 61 (L) 75 - 100 MMHG    HCO3 (POC) 19.7 (L) 22 - 26 MMOL/L    sO2 (POC) 94 (L) 95 - 98 %    Base deficit (POC) 2 mmol/L    Set Rate 16 bpm    PEEP/CPAP (POC) 12 cmH2O    PIP (POC) 22      Allens test (POC) YES      Inspiratory Time 0.95 sec    Site RIGHT RADIAL      Specimen type (POC) ARTERIAL      Performed by Kate     CO2, POC 20 MMOL/L    Spontaneous timed 16      Pressure control 2      Critical value read back 02:15     Respiratory comment: NurseNotified     Exhaled minute volume 26.00 L/min    COLLECT TIME 210     MAGNESIUM    Collection Time: 10/02/20  4:34 AM   Result Value Ref Range    Magnesium 2.2 1.8 - 2.4 mg/dL   PROCALCITONIN    Collection Time: 10/02/20  4:34 AM   Result Value Ref Range    Procalcitonin 6.14 ng/mL   CBC W/O DIFF    Collection Time: 10/02/20  4:34 AM   Result Value Ref Range    WBC 18.4 (H) 4.3 - 11.1 K/uL    RBC 3.56 (L) 4.05 - 5.2 M/uL    HGB 10.7 (L) 11.7 - 15.4 g/dL    HCT 31.5 (L) 35.8 - 46.3 %    MCV 88.5 79.6 - 97.8 FL    MCH 30.1 26.1 - 32.9 PG    MCHC 34.0 31.4 - 35.0 g/dL    RDW 15.1 (H) 11.9 - 14.6 %    PLATELET 680 (L) 369 - 450 K/uL    MPV 11.5 9.4 - 12.3 FL    ABSOLUTE NRBC 0.00 0.0 - 0.2 K/uL   METABOLIC PANEL, BASIC    Collection Time: 10/02/20  4:34 AM   Result Value Ref Range    Sodium 138 136 - 145 mmol/L    Potassium 3.7 3.5 - 5.1 mmol/L    Chloride 104 98 - 107 mmol/L    CO2 27 21 - 32 mmol/L    Anion gap 7 7 - 16 mmol/L    Glucose 153 (H) 65 - 100 mg/dL    BUN 25 (H) 8 - 23 MG/DL    Creatinine 1.32 (H) 0.6 - 1.0 MG/DL    GFR est AA 52 (L) >60 ml/min/1.73m2    GFR est non-AA 43 (L) >60 ml/min/1.73m2    Calcium 7.5 (L) 8.3 - 10.4 MG/DL   GLUCOSE, POC    Collection Time: 10/02/20  6:04 AM   Result Value Ref Range    Glucose (POC) 153 (H) 65 - 100 mg/dL   POC G3    Collection Time: 10/02/20 10:44 AM   Result Value Ref Range    Device: VENT      FIO2 (POC) 100 %    pH (POC) 7.44 7.35 - 7.45      pCO2 (POC) 28.3 (L) 35 - 45 MMHG    pO2 (POC) 81 75 - 100 MMHG    HCO3 (POC) 19.2 (L) 22 - 26 MMOL/L    sO2 (POC) 97 95 - 98 %    Base deficit (POC) 4 mmol/L    Mode Pressure regulated volume control      Tidal volume 500 ml    Set Rate 22 bpm    PEEP/CPAP (POC) 14 cmH2O    Allens test (POC) YES      Site LEFT RADIAL      Specimen type (POC) ARTERIAL      Performed by Gloria     CO2, POC 20 MMOL/L    Exhaled minute volume 10.60 L/min    COLLECT TIME 1,040     GLUCOSE, POC    Collection Time: 10/02/20 11:37 AM   Result Value Ref Range    Glucose (POC) 145 (H) 65 - 100 mg/dL       Assessment:      required re intubation. Bronch planned    Will begin tube feedings      Plan/Recommendations/Medical Decision Making:     Continue present treatment         See orders    Bang Oliva.  Velma Mark MD

## 2020-10-02 NOTE — PROGRESS NOTES
Updated daughter Elisa Edwards via phone of intubation. Explained agitation, deoxygentation issues. All questions and concerns answered. Verbalized understanding. Elisa Edwards will call her other sister to update.

## 2020-10-02 NOTE — PROGRESS NOTES
Dr. [employer] called; patient is very labored and tachypneic. O2 sat is 86% on 100% BIPAP. Orders obtained for medications for emergency intubation. RT called.

## 2020-10-02 NOTE — PROGRESS NOTES
Called to assist with bedside bronchoscopy. Critical care consent noted on chart. Time out performed. Pts vitals monitored throughout procedure. Scope # 17 used. Procedure tolerated with no adverse rxn. 2 mg versed given Iv per MD order to pts iv line. BAL sent to the lab x1 and labeled appropriately. Pt's RN Baby Arnt at bedside throughout procedure.

## 2020-10-02 NOTE — PROGRESS NOTES
Problem: Ventilator Management  Goal: *Adequate oxygenation and ventilation  Outcome: Progressing Towards Goal  Goal: *Patient maintains clear airway/free of aspiration  Outcome: Progressing Towards Goal  Goal: *Absence of infection signs and symptoms  Outcome: Progressing Towards Goal  Goal: *Normal spontaneous ventilation  Outcome: Progressing Towards Goal     Problem: Patient Education: Go to Patient Education Activity  Goal: Patient/Family Education  Outcome: Progressing Towards Goal

## 2020-10-02 NOTE — PROGRESS NOTES
Kia Daly  Admission Date: 9/29/2020             Daily Progress Note: 10/2/2020    The patient's chart is reviewed and the patient is discussed with the staff.     Patient is seen at the request of Dr. Carmelina Gray for respiratory management status post cardiac surgery. Patient had CABG x 4. Currently is sedated in CV-ICU and orally intubated receiving  mechanical ventilation. Pt presented to the ER at [facility] on 9/18/2020 with chest pain and dyspnea. She was found to be in afib RVR. She was started on cardizem and admitted by cardiology. 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 Pt was seen by CT surgery and deemed appropriate for CABG. Subjective:     Remains on BIPAP this AM. Precedex started overnight for ongoing agitation but had to back of due to bradycardia. Sats better after adequately sedated but still on 100% O2. Sat just 99 when calm.      Current Facility-Administered Medications   Medication Dose Route Frequency    dexmedeTOMidine (PRECEDEX) 400 mcg in 0.9% sodium chloride 100 mL infusion  0.2-0.7 mcg/kg/hr IntraVENous TITRATE    furosemide (LASIX) injection 40 mg  40 mg IntraVENous BID    senna-docusate (PERICOLACE) 8.6-50 mg per tablet 2 Tab  2 Tab Oral BID    LORazepam (ATIVAN) injection 1 mg  1 mg IntraVENous Q4H PRN    traMADoL (ULTRAM) tablet 100 mg  100 mg Oral Q6H PRN    oxyCODONE-acetaminophen (PERCOCET) 5-325 mg per tablet 1 Tab  1 Tab Oral Q4H PRN    insulin regular (NOVOLIN R, HUMULIN R) injection   SubCUTAneous AC&HS    LORazepam (ATIVAN) tablet 1 mg  1 mg Oral Q6H PRN    alcohol 62% (NOZIN) nasal  1 Ampule  1 Ampule Topical Q12H    rosuvastatin (CRESTOR) tablet 40 mg  40 mg Oral QHS    dextrose 5% - 0.45% NaCl with KCl 20 mEq/L infusion  25 mL/hr IntraVENous CONTINUOUS    sodium chloride (NS) flush 5-40 mL  5-40 mL IntraVENous Q8H    sodium chloride (NS) flush 5-40 mL  5-40 mL IntraVENous PRN    naloxone (NARCAN) injection 0.4 mg  0.4 mg IntraVENous PRN    sodium bicarbonate (8.4%) injection 50 mEq  50 mEq IntraVENous PRN    nitroglycerin (Tridil) 200 mcg/ml infusion   mcg/min IntraVENous TITRATE    amiodarone (CORDARONE) tablet 200 mg  200 mg Oral Q12H    metoprolol tartrate (LOPRESSOR) tablet 25 mg  25 mg Oral Q12H    ondansetron (ZOFRAN) injection 4 mg  4 mg IntraVENous Q4H PRN    dextrose (D50W) injection syrg 12.5 g  25 mL IntraVENous PRN    aspirin chewable tablet 81 mg  81 mg Oral DAILY    magnesium sulfate 1 g/100 ml IVPB (premix or compounded)  1 g IntraVENous PRN    famotidine (PF) (PEPCID) 20 mg in 0.9% sodium chloride 10 mL injection  20 mg IntraVENous Q12H    EPINEPHrine (ADRENALIN) 4 mg in 0.9% sodium chloride 250 mL infusion (PRE-MIX)  0.01-0.05 mcg/kg/min IntraVENous TITRATE    acetaminophen (TYLENOL) tablet 650 mg  650 mg Oral Q4H PRN    niCARdipine in Saline (CARDENE) 25 MG/250 mL infusion kit  5-15 mg/hr IntraVENous TITRATE     Review of Systems  Constitutional: negative for fever, chills, sweats  Cardiovascular: negative for chest pain, palpitations, syncope, edema  Gastrointestinal:  negative for dysphagia, reflux, vomiting, diarrhea, abdominal pain, or melena  Neurologic:  negative for focal weakness, numbness, headache    Positive for:  Dyspnea, anxiety, agitation, restlessness.     Objective:     Vitals:    10/02/20 0430 10/02/20 0445 10/02/20 0500 10/02/20 0546   BP: (!) 122/57 (!) 120/58 120/60    Pulse: (!) 51 (!) 55 (!) 51    Resp: (!) 37 (!) 40 (!) 34    Temp:       SpO2: 98% 93% 92% 94%   Weight:       Height:           Intake/Output Summary (Last 24 hours) at 10/2/2020 0556  Last data filed at 10/2/2020 0400  Gross per 24 hour   Intake    Output 2605 ml   Net -2605 ml     Physical Exam:   Constitution:  the patient is well developed and in no acute distress  EENMT:  Sclera clear, pupils equal, oral mucosa moist  Respiratory: clear, diminished but no overt crackles  Cardiovascular:  RRR without M,G,R  Gastrointestinal: soft and non-tender; with positive bowel sounds. Musculoskeletal: warm without cyanosis. There is no lower extremity edema. Skin:  no jaundice or rashes, sternal wound   Neurologic: no gross neuro deficits     Psychiatric:  alert and oriented x 4    CXR:     10/2:          10/1:            LAB  Recent Labs     10/01/20  2127 10/01/20  1730 10/01/20  1115 10/01/20  0613 09/30/20  2123   GLUCPOC 232* 217* 149* 151* 164*      Recent Labs     10/02/20  0434 10/01/20  0250 09/30/20  0239 09/29/20  2108  09/29/20  1341   WBC 18.4* 24.2* 17.2*  --   --  12.6*   HGB 10.7* 12.1 12.1 12.5   < > 11.9   HCT 31.5* 37.8 38.8 39.4   < > 35.8   * 172 170  --   --  147*   INR  --   --   --   --   --  1.3    < > = values in this interval not displayed. Recent Labs     10/02/20  0434 10/01/20  0250 09/30/20  0239    136 143   K 3.7 4.3 4.4    105 113*   CO2 27 26 28   * 144* 128*   BUN 25* 20 12   CREA 1.32* 1.27* 1.03*   MG 2.2 2.1 2.3   CA 7.5* 7.6* 7.1*     Recent Labs     10/02/20  0218 10/01/20  0951 10/01/20  0242   PHI 7.50* 7.43 7.43   PCO2I 25.2* 32.9* 32.2*   PO2I 61* 59* 46*   HCO3I 19.7* 21.7* 21.5*     No results for input(s): LCAD, LAC in the last 72 hours.     CRP 6.14    Assessment:  (Medical Decision Making)     Hospital Problems  Date Reviewed: 9/22/2020          Codes Class Noted POA    Pulmonary edema ICD-10-CM: J81.1  ICD-9-CM: 522  10/1/2020 Unknown    Ongoing    Anxiety ICD-10-CM: F41.9  ICD-9-CM: 300.00  10/1/2020 Unknown    Ongoing    AMOR on CPAP ICD-10-CM: G47.33, Z99.89  ICD-9-CM: 327.23, V46.8  9/30/2020 Unknown    Now on BIPAP    Paroxysmal A-fib (HCC) ICD-10-CM: I48.0  ICD-9-CM: 427.31  9/29/2020 Unknown        Atherosclerosis of native coronary artery of native heart with unstable angina pectoris ([facility] [address] ICD-10-CM: I25.110  ICD-9-CM: 414.01, 411.1  9/29/2020 Unknown        Hypoxemia ICD-10-CM: R09.02  ICD-9-CM: 799.02  9/29/2020 Unknown Severe    * (Principal) S/P CABG x 4 ICD-10-CM: Z95.1  ICD-9-CM: V45.81  9/29/2020 Unknown        CAD (coronary artery disease) ICD-10-CM: I25.10  ICD-9-CM: 414.00  9/29/2020 Unknown        Paroxysmal atrial fibrillation (HCC) ICD-10-CM: I48.0  ICD-9-CM: 427.31  9/18/2020 Unknown            Patinet with CAD/AFIB/AMOR on home CPAP/anxiety issues s/p CABG x4. CXR noted ongoing  pulmonary edema. Anxiety is an issue now    Plan:  (Medical Decision Making)     Increase EPAP to 12. Wean oxygen as able. Diurese; increase lasix. Control anxiety as best as possible. Keep precedex to a minimum. WBC up and PCT up. Start Rocephin after blood and urine cx. Restrain prn. Condition is critical  Time spent with care is 32 minutes. More than 50% of the time documented was spent in face-to-face contact with the patient and in the care of the patient on the floor/unit where the patient is located.     Sadie Guzman MD

## 2020-10-02 NOTE — PROGRESS NOTES
Comprehensive Nutrition Assessment    Type and Reason for Visit: Initial, Consult(TF management (Cardiology))    Nutrition Recommendations/Plan:   Initiate Glucerna 1.5 via NGT at 25 ml/hour, progress by 10ml/hour every 6 hours to goal rate of 45ml/hour. Water flush 30/hour. Prosource TF - 2 packets at 9A with 50 ml water before and after. At goal will provide 1620 kcal (100% estimated calorie needs), 111 grams protein (100% estimated protein needs) and 1639ml free fluid (~1ml/kcal). EN labs: BMP daily, Mg and Phos MWF. Nutrition Assessment:  Patient admitted for CABG. She has a PMH significant for HTNm CHF, HLD, afib, NSTEMI. S/p CABG 10/1. She was extubated after surgery, but had to be re-intubated today. She is also s/p bronch today. Spoke with daughter at bedside. She states that patient was eating at baseline prior to surgery. She does not know date of last BM, but does report that patient was complaining of constipation in the week leading up to surgery. Labs: Glucose 153, A1c 8.2  Medications: Rocephin, fentanyl, Lasix, SSI, Versed, Levo, Protonix, Pericolace    Estimated Daily Nutrient Needs:  Energy (kcal):  3204-6265(59-46 kcal/kg (85.4kg))  Protein (g):  >104(>2 g/kkg IBW (52.3kg))       Fluid (ml/day):  8834-6952(~1 ml/kcal)    Wounds:    Surgical wound       Current Nutrition Therapies:  DIET CARDIAC Regular; Consistent Carb 1800kcal  DIET NPO    Anthropometric Measures:  · Height:  5' 3\" (160 cm)  · Current Body Wt:  85.4 kg (188 lb 4.4 oz)(bed scale)   · Body mass index is 33.35 kg/m². · BMI Category:  Obese class 1 (BMI 30.0-34. 9)       Nutrition Diagnosis:   · Inadequate oral intake related to impaired respiratory function as evidenced by (intubated)    Nutrition Interventions:   Food and/or Nutrient Delivery: Start tube feeding  Coordination of Nutrition Care: (Discussed with Monico Carrillo RN)    Goals:  Meet at least 75% nutrition needs with EN until able to take adequate PO Nutrition Monitoring and Evaluation:   Food/Nutrient Intake Outcomes: Enteral nutrition intake/tolerance  Physical Signs/Symptoms Outcomes: Biochemical data, GI status    Discharge Planning:     Too soon to determine     [address] [place], LD on 10/2/2020 at 1:31 PM  Contact: [phone]

## 2020-10-02 NOTE — PROGRESS NOTES
A follow up visit was made to the patient. Emotional support, spiritual presence and   prayer were provided for the patient and her daughter, Job. The patient was not alert.       LIV Childers

## 2020-10-02 NOTE — PROGRESS NOTES
Ventilator check complete; patient has a #7.5 ET tube secured at the 24 at the lip. Patient is sedated. Patient is not able to follow commands. Breath sounds are coarse and diminished. Trachea is midline, Negative for subcutaneous air, and chest excursion is symmetric. Patient is also Negative for cyanosis and is Negative for pitting edema. All alarms are set and audible. Resuscitation bag is at the head of the bed.       Ventilator Settings  Mode FIO2 Rate Tidal Volume Pressure PEEP I:E Ratio   PRVC  90 %    450 ml  10 cm H2O  14 cm H20  1:2.0      Peak airway pressure: 27 cm H2O   Minute ventilation: 9.1 l/min     ABG:   @ 1044  Ph 7.44  CO2 28  PO2 81  HCO3 19      [facility] Back, RT

## 2020-10-02 NOTE — PROGRESS NOTES
Problem: Falls - Risk of  Goal: *Absence of Falls  Description: Document Zoraida De La Cruz Fall Risk and appropriate interventions in the flowsheet. Outcome: Progressing Towards Goal  Note: Fall Risk Interventions:  Mobility Interventions: Bed/chair exit alarm, Patient to call before getting OOB    Mentation Interventions: Bed/chair exit alarm    Medication Interventions: Bed/chair exit alarm, Patient to call before getting OOB    Elimination Interventions: Bed/chair exit alarm, Call light in reach              Problem: Patient Education: Go to Patient Education Activity  Goal: Patient/Family Education  Outcome: Progressing Towards Goal     Problem: Pressure Injury - Risk of  Goal: *Prevention of pressure injury  Description: Document Jayy Scale and appropriate interventions in the flowsheet.   Outcome: Progressing Towards Goal  Note: Pressure Injury Interventions:  Sensory Interventions: Assess changes in LOC, Float heels, Keep linens dry and wrinkle-free         Activity Interventions: Increase time out of bed, Pressure redistribution bed/mattress(bed type), PT/OT evaluation    Mobility Interventions: Chair cushion, Float heels, HOB 30 degrees or less, PT/OT evaluation, Pressure redistribution bed/mattress (bed type)    Nutrition Interventions: Document food/fluid/supplement intake, Discuss nutritional consult with provider, Offer support with meals,snacks and hydration    Friction and Shear Interventions: Feet elevated on foot rest, Lift sheet, HOB 30 degrees or less                Problem: Patient Education: Go to Patient Education Activity  Goal: Patient/Family Education  Outcome: Progressing Towards Goal     Problem: Non-Violent Restraints  Goal: *Removal from restraints as soon as assessed to be safe  Outcome: Progressing Towards Goal  Goal: *No harm/injury to patient while restraints in use  Outcome: Progressing Towards Goal  Goal: *Patient's dignity will be maintained  Outcome: Progressing Towards Goal  Goal: *Patient Specific Goal Patient will not attempt to pull out any tubes and or wires; will demonstrate and verbalize understanding  Outcome: Progressing Towards Goal  Goal: Non-violent Restaints:Standard Interventions  Outcome: Progressing Towards Goal  Goal: Non-violent Restraints:Patient Interventions  Outcome: Progressing Towards Goal  Goal: Patient/Family Education  Outcome: Progressing Towards Goal

## 2020-10-03 ENCOUNTER — APPOINTMENT (OUTPATIENT)
Dept: GENERAL RADIOLOGY | Age: 60
DRG: 228 | End: 2020-10-03
Attending: THORACIC SURGERY (CARDIOTHORACIC VASCULAR SURGERY)
Payer: COMMERCIAL

## 2020-10-03 PROBLEM — I95.9 HYPOTENSION: Status: ACTIVE | Noted: 2020-10-03

## 2020-10-03 LAB
ANION GAP SERPL CALC-SCNC: 6 MMOL/L (ref 7–16)
ARTERIAL PATENCY WRIST A: NORMAL
BASE EXCESS BLD CALC-SCNC: 0 MMOL/L
BDY SITE: NORMAL
BNP SERPL-MCNC: 6526 PG/ML (ref 5–125)
BUN SERPL-MCNC: 36 MG/DL (ref 8–23)
CALCIUM SERPL-MCNC: 7.8 MG/DL (ref 8.3–10.4)
CHLORIDE SERPL-SCNC: 105 MMOL/L (ref 98–107)
CO2 BLD-SCNC: 26 MMOL/L
CO2 SERPL-SCNC: 29 MMOL/L (ref 21–32)
COLLECT TIME,HTIME: 400
CREAT SERPL-MCNC: 1.68 MG/DL (ref 0.6–1)
ERYTHROCYTE [DISTWIDTH] IN BLOOD BY AUTOMATED COUNT: 15.1 % (ref 11.9–14.6)
EXHALED MINUTE VOLUME, VE: 8.2 L/MIN
GAS FLOW.O2 O2 DELIVERY SYS: NORMAL L/MIN
GAS FLOW.O2 SETTING OXYMISER: 18 BPM
GLUCOSE BLD STRIP.AUTO-MCNC: 178 MG/DL (ref 65–100)
GLUCOSE BLD STRIP.AUTO-MCNC: 181 MG/DL (ref 65–100)
GLUCOSE BLD STRIP.AUTO-MCNC: 188 MG/DL (ref 65–100)
GLUCOSE BLD STRIP.AUTO-MCNC: 202 MG/DL (ref 65–100)
GLUCOSE SERPL-MCNC: 142 MG/DL (ref 65–100)
HCO3 BLD-SCNC: 24.9 MMOL/L (ref 22–26)
HCT VFR BLD AUTO: 31.8 % (ref 35.8–46.3)
HGB BLD-MCNC: 10.2 G/DL (ref 11.7–15.4)
INSPIRATION.DURATION SETTING TIME VENT: 1.11 SEC
MAGNESIUM SERPL-MCNC: 2.3 MG/DL (ref 1.8–2.4)
MCH RBC QN AUTO: 29 PG (ref 26.1–32.9)
MCHC RBC AUTO-ENTMCNC: 32.1 G/DL (ref 31.4–35)
MCV RBC AUTO: 90.3 FL (ref 79.6–97.8)
NRBC # BLD: 0 K/UL (ref 0–0.2)
O2/TOTAL GAS SETTING VFR VENT: 85 %
PCO2 BLD: 38.8 MMHG (ref 35–45)
PEEP RESPIRATORY: 14 CMH2O
PH BLD: 7.42 [PH] (ref 7.35–7.45)
PLATELET # BLD AUTO: 188 K/UL (ref 150–450)
PMV BLD AUTO: 11.3 FL (ref 9.4–12.3)
PO2 BLD: 81 MMHG (ref 75–100)
POTASSIUM SERPL-SCNC: 3.3 MMOL/L (ref 3.5–5.1)
PRESSURE CONTROL, IPC: 16
RBC # BLD AUTO: 3.52 M/UL (ref 4.05–5.2)
SAO2 % BLD: 96 % (ref 95–98)
SERVICE CMNT-IMP: NORMAL
SERVICE CMNT-IMP: NORMAL
SODIUM SERPL-SCNC: 140 MMOL/L (ref 136–145)
SPECIMEN TYPE: NORMAL
VENTILATION MODE VENT: NORMAL
WBC # BLD AUTO: 15.2 K/UL (ref 4.3–11.1)

## 2020-10-03 PROCEDURE — 77030018798 HC PMP KT ENTRL FED COVD -A

## 2020-10-03 PROCEDURE — 94003 VENT MGMT INPAT SUBQ DAY: CPT

## 2020-10-03 PROCEDURE — 74011250636 HC RX REV CODE- 250/636: Performed by: INTERNAL MEDICINE

## 2020-10-03 PROCEDURE — 36600 WITHDRAWAL OF ARTERIAL BLOOD: CPT

## 2020-10-03 PROCEDURE — C9113 INJ PANTOPRAZOLE SODIUM, VIA: HCPCS | Performed by: INTERNAL MEDICINE

## 2020-10-03 PROCEDURE — 74011250637 HC RX REV CODE- 250/637: Performed by: THORACIC SURGERY (CARDIOTHORACIC VASCULAR SURGERY)

## 2020-10-03 PROCEDURE — 74011250637 HC RX REV CODE- 250/637: Performed by: PHYSICIAN ASSISTANT

## 2020-10-03 PROCEDURE — 85027 COMPLETE CBC AUTOMATED: CPT

## 2020-10-03 PROCEDURE — 82803 BLOOD GASES ANY COMBINATION: CPT

## 2020-10-03 PROCEDURE — 65610000006 HC RM INTENSIVE CARE

## 2020-10-03 PROCEDURE — 99291 CRITICAL CARE FIRST HOUR: CPT | Performed by: INTERNAL MEDICINE

## 2020-10-03 PROCEDURE — 71045 X-RAY EXAM CHEST 1 VIEW: CPT

## 2020-10-03 PROCEDURE — 82962 GLUCOSE BLOOD TEST: CPT

## 2020-10-03 PROCEDURE — 74011000250 HC RX REV CODE- 250: Performed by: INTERNAL MEDICINE

## 2020-10-03 PROCEDURE — 83880 ASSAY OF NATRIURETIC PEPTIDE: CPT

## 2020-10-03 PROCEDURE — 36592 COLLECT BLOOD FROM PICC: CPT

## 2020-10-03 PROCEDURE — 74011636637 HC RX REV CODE- 636/637: Performed by: THORACIC SURGERY (CARDIOTHORACIC VASCULAR SURGERY)

## 2020-10-03 PROCEDURE — 74011000258 HC RX REV CODE- 258: Performed by: INTERNAL MEDICINE

## 2020-10-03 PROCEDURE — 74011250636 HC RX REV CODE- 250/636: Performed by: THORACIC SURGERY (CARDIOTHORACIC VASCULAR SURGERY)

## 2020-10-03 PROCEDURE — 80048 BASIC METABOLIC PNL TOTAL CA: CPT

## 2020-10-03 PROCEDURE — 2709999900 HC NON-CHARGEABLE SUPPLY

## 2020-10-03 PROCEDURE — 83735 ASSAY OF MAGNESIUM: CPT

## 2020-10-03 RX ORDER — POTASSIUM CHLORIDE 14.9 MG/ML
20 INJECTION INTRAVENOUS ONCE
Status: COMPLETED | OUTPATIENT
Start: 2020-10-03 | End: 2020-10-03

## 2020-10-03 RX ORDER — VANCOMYCIN 2 GRAM/500 ML IN 0.9 % SODIUM CHLORIDE INTRAVENOUS
2000 ONCE
Status: COMPLETED | OUTPATIENT
Start: 2020-10-03 | End: 2020-10-03

## 2020-10-03 RX ADMIN — Medication 10 ML: at 06:12

## 2020-10-03 RX ADMIN — Medication 10 ML: at 21:54

## 2020-10-03 RX ADMIN — CEFTRIAXONE 2 G: 2 INJECTION, POWDER, FOR SOLUTION INTRAMUSCULAR; INTRAVENOUS at 07:52

## 2020-10-03 RX ADMIN — DOCUSATE SODIUM 50 MG AND SENNOSIDES 8.6 MG 2 TABLET: 8.6; 5 TABLET, FILM COATED ORAL at 08:03

## 2020-10-03 RX ADMIN — ACETAMINOPHEN 650 MG: 325 TABLET, FILM COATED ORAL at 11:06

## 2020-10-03 RX ADMIN — DEXTROSE MONOHYDRATE 4 MCG/MIN: 50 INJECTION, SOLUTION INTRAVENOUS at 16:18

## 2020-10-03 RX ADMIN — POTASSIUM CHLORIDE 20 MEQ: 14.9 INJECTION, SOLUTION INTRAVENOUS at 07:52

## 2020-10-03 RX ADMIN — Medication 1 AMPULE: at 21:54

## 2020-10-03 RX ADMIN — VANCOMYCIN HYDROCHLORIDE 2000 MG: 10 INJECTION, POWDER, LYOPHILIZED, FOR SOLUTION INTRAVENOUS at 14:00

## 2020-10-03 RX ADMIN — PIPERACILLIN SODIUM AND TAZOBACTAM SODIUM 3.38 G: 3; .375 INJECTION, POWDER, LYOPHILIZED, FOR SOLUTION INTRAVENOUS at 14:36

## 2020-10-03 RX ADMIN — FUROSEMIDE 60 MG: 10 INJECTION INTRAMUSCULAR; INTRAVENOUS at 08:29

## 2020-10-03 RX ADMIN — DOCUSATE SODIUM 50 MG AND SENNOSIDES 8.6 MG 2 TABLET: 8.6; 5 TABLET, FILM COATED ORAL at 21:54

## 2020-10-03 RX ADMIN — Medication 10 ML: at 21:55

## 2020-10-03 RX ADMIN — Medication 1 AMPULE: at 08:03

## 2020-10-03 RX ADMIN — INSULIN HUMAN 4 UNITS: 100 INJECTION, SOLUTION PARENTERAL at 11:07

## 2020-10-03 RX ADMIN — Medication 10 ML: at 14:41

## 2020-10-03 RX ADMIN — ASPIRIN 81 MG: 81 TABLET, CHEWABLE ORAL at 08:03

## 2020-10-03 RX ADMIN — INSULIN HUMAN 2 UNITS: 100 INJECTION, SOLUTION PARENTERAL at 18:27

## 2020-10-03 RX ADMIN — SODIUM CHLORIDE 75 MCG/HR: 900 INJECTION, SOLUTION INTRAVENOUS at 10:35

## 2020-10-03 RX ADMIN — INSULIN HUMAN 2 UNITS: 100 INJECTION, SOLUTION PARENTERAL at 06:14

## 2020-10-03 RX ADMIN — INSULIN HUMAN 2 UNITS: 100 INJECTION, SOLUTION PARENTERAL at 22:00

## 2020-10-03 RX ADMIN — PIPERACILLIN SODIUM AND TAZOBACTAM SODIUM 3.38 G: 3; .375 INJECTION, POWDER, LYOPHILIZED, FOR SOLUTION INTRAVENOUS at 22:44

## 2020-10-03 RX ADMIN — PANTOPRAZOLE SODIUM 40 MG: 40 INJECTION, POWDER, FOR SOLUTION INTRAVENOUS at 08:02

## 2020-10-03 RX ADMIN — ROSUVASTATIN CALCIUM 40 MG: 20 TABLET, COATED ORAL at 21:53

## 2020-10-03 NOTE — PROGRESS NOTES
Critical Care Daily Progress Note: 10/3/2020  Admission Date: 9/29/2020     The patient's chart is reviewed and the patient is discussed with the staff.       55 y/o s/p cabg 9/29,   reintubated yesterday 10/2  Subjective:   Remains on vent , cr rising, on 2 maggie levophed    Current Facility-Administered Medications   Medication Dose Route Frequency    potassium chloride 20 mEq in 100 ml IVPB  20 mEq IntraVENous ONCE    cefTRIAXone (ROCEPHIN) 2 g in 0.9% sodium chloride (MBP/ADV) 50 mL  2 g IntraVENous Q24H    furosemide (LASIX) injection 60 mg  60 mg IntraVENous BID    LORazepam (ATIVAN) tablet 1 mg  1 mg Oral Q4H PRN    pantoprazole (PROTONIX) 40 mg in 0.9% sodium chloride 10 mL injection  40 mg IntraVENous Q24H    fentaNYL in normal saline (pf) 25 mcg/mL infusion  0-200 mcg/hr IntraVENous TITRATE    NOREPINephrine (LEVOPHED) 4 mg in 5% dextrose 250 mL infusion  0.5-16 mcg/min IntraVENous TITRATE    midazolam in normal saline (VERSED) 1 mg/mL infusion  0-10 mg/hr IntraVENous TITRATE    sodium chloride (NS) flush 5-40 mL  5-40 mL IntraVENous Q8H    sodium chloride (NS) flush 5-40 mL  5-40 mL IntraVENous PRN    midazolam (VERSED) injection 2 mg  2 mg IntraVENous Multiple    NUTRITIONAL SUPPORT ELECTROLYTE PRN ORDERS   Does Not Apply PRN    senna-docusate (PERICOLACE) 8.6-50 mg per tablet 2 Tab  2 Tab Oral BID    LORazepam (ATIVAN) injection 1 mg  1 mg IntraVENous Q4H PRN    traMADoL (ULTRAM) tablet 100 mg  100 mg Oral Q6H PRN    oxyCODONE-acetaminophen (PERCOCET) 5-325 mg per tablet 1 Tab  1 Tab Oral Q4H PRN    insulin regular (NOVOLIN R, HUMULIN R) injection   SubCUTAneous AC&HS    alcohol 62% (NOZIN) nasal  1 Ampule  1 Ampule Topical Q12H    rosuvastatin (CRESTOR) tablet 40 mg  40 mg Oral QHS    dextrose 5% - 0.45% NaCl with KCl 20 mEq/L infusion  25 mL/hr IntraVENous CONTINUOUS    sodium chloride (NS) flush 5-40 mL  5-40 mL IntraVENous Q8H    sodium chloride (NS) flush 5-40 mL  5-40 mL IntraVENous PRN    naloxone (NARCAN) injection 0.4 mg  0.4 mg IntraVENous PRN    sodium bicarbonate (8.4%) injection 50 mEq  50 mEq IntraVENous PRN    nitroglycerin (Tridil) 200 mcg/ml infusion   mcg/min IntraVENous TITRATE    amiodarone (CORDARONE) tablet 200 mg  200 mg Oral Q12H    metoprolol tartrate (LOPRESSOR) tablet 25 mg  25 mg Oral Q12H    ondansetron (ZOFRAN) injection 4 mg  4 mg IntraVENous Q4H PRN    dextrose (D50W) injection syrg 12.5 g  25 mL IntraVENous PRN    aspirin chewable tablet 81 mg  81 mg Oral DAILY    magnesium sulfate 1 g/100 ml IVPB (premix or compounded)  1 g IntraVENous PRN    EPINEPHrine (ADRENALIN) 4 mg in 0.9% sodium chloride 250 mL infusion (PRE-MIX)  0.01-0.05 mcg/kg/min IntraVENous TITRATE    acetaminophen (TYLENOL) tablet 650 mg  650 mg Oral Q4H PRN    niCARdipine in Saline (CARDENE) 25 MG/250 mL infusion kit  5-15 mg/hr IntraVENous TITRATE       Review of Systems   Unobtainable due to patient status. Objective:     Vitals:    10/03/20 0645 10/03/20 0700 10/03/20 0715 10/03/20 0740   BP: (!) 99/54 (!) 96/54 (!) 95/54    Pulse: 84 84 85 85   Resp: 16 21 18 18   Temp:       SpO2: 96% 96% 96% 96%   Weight:       Height:             Intake/Output Summary (Last 24 hours) at 10/3/2020 0743  Last data filed at 10/3/2020 0600  Gross per 24 hour   Intake 1724.66 ml   Output 1305 ml   Net 419.66 ml         Physical Exam:          Constitutional:  intubated and mechanically ventilated.   EENMT:  Sclera clear, pupils equal, oral mucosa moist  Respiratory:clear  Cardiovascular:  RRR with no M,G,R;  Gastrointestinal:  soft with no tenderness; positive bowel sounds present  Musculoskeletal:  warm with no cyanosis, no lower extremity edema  Skin:  no jaundice or ecchymosis  Neurologic: no gross neuro deficits     Psychiatric:  sedated    LINES:    ETT    DRIPS:    Levophed, versed , fentanyl    CXR:        Ventilator Settings  Mode FIO2 Rate Tidal Volume Pressure PEEP   Pressure control  85 %    450 ml  14 cm H2O  14 cm H20      Peak airway pressure: 29.9 cm H2O   Minute ventilation: 8.8 l/min     ABG:   Recent Labs     10/03/20  0403 10/02/20  1044 10/02/20  0218   PHI 7.42 7.44 7.50*   PCO2I 38.8 28.3* 25.2*   PO2I 81 81 61*   HCO3I 24.9 19.2* 19.7*       LAB  Recent Labs     10/03/20  0612 10/02/20  2121 10/02/20  1608 10/02/20  1137 10/02/20  0604   GLUCPOC 181* 129* 129* 145* 153*     Recent Labs     10/03/20  0417 10/02/20  0434 10/01/20  0250   WBC 15.2* 18.4* 24.2*   HGB 10.2* 10.7* 12.1   HCT 31.8* 31.5* 37.8    149* 172     Recent Labs     10/03/20  0417 10/02/20  0434 10/01/20  0250    138 136   K 3.3* 3.7 4.3    104 105   CO2 29 27 26   * 153* 144*   BUN 36* 25* 20   CREA 1.68* 1.32* 1.27*   MG 2.3 2.2 2.1   CA 7.8* 7.5* 7.6*     No results for input(s): LCAD, LAC in the last 72 hours.       Patient Active Problem List   Diagnosis Code    Paroxysmal atrial fibrillation (HCC) I48.0    Essential hypertension I10    Mixed hyperlipidemia E78.2    Uterine cancer ([facility] [address] C55    Chest pain R07.9    Atrial fibrillation (HCC) I48.91    Paroxysmal A-fib (HCC) I48.0    Atherosclerosis of native coronary artery of native heart with unstable angina pectoris (HCC) I25.110    S/P CABG x 4 Z95.1    Suspected sleep apnea R29.818    CAD (coronary artery disease) I25.10    AMOR on CPAP G47.33, Z99.89    Pulmonary edema J81.1    Anxiety F41.9    Acute respiratory failure with hypoxia (HCC) J96.01    Bilateral pulmonary infiltrates on chest x-ray R91.8    Hypotension I95.9         Assessment:  (Medical Decision Making)     Hospital Problems  Date Reviewed: 9/22/2020          Codes Class Noted POA    Hypotension ICD-10-CM: I95.9  ICD-9-CM: 458.9  10/3/2020 Unknown    On pressors    Acute respiratory failure with hypoxia [facility]) ICD-10-CM: J96.01  ICD-9-CM: 518.81  10/2/2020 Unknown    Critically ill on 85% fio2    Bilateral pulmonary infiltrates on chest x-ray ICD-10-CM: R91.8  ICD-9-CM: 793.19  10/2/2020 Unknown    R>L    Pulmonary edema ICD-10-CM: J81.1  ICD-9-CM: 754  10/1/2020 Unknown        Anxiety ICD-10-CM: F41.9  ICD-9-CM: 300.00  10/1/2020 Unknown        AMOR on CPAP ICD-10-CM: G47.33, Z99.89  ICD-9-CM: 327.23, V46.8  9/30/2020 Unknown        Paroxysmal A-fib (HCC) ICD-10-CM: I48.0  ICD-9-CM: 427.31  9/29/2020 Unknown        Atherosclerosis of native coronary artery of native heart with unstable angina pectoris (HCC) ICD-10-CM: I25.110  ICD-9-CM: 414.01, 411.1  9/29/2020 Unknown        * (Principal) S/P CABG x 4 ICD-10-CM: Z95.1  ICD-9-CM: V45.81  9/29/2020 Unknown        CAD (coronary artery disease) ICD-10-CM: I25.10  ICD-9-CM: 414.00  9/29/2020 Unknown        Paroxysmal atrial fibrillation ([facility] [address] ICD-10-CM: I48.0  ICD-9-CM: 427.31  9/18/2020 Unknown              Plan:  (Medical Decision Making)   1    Wean fio2 as tolerated  2    Check bnp  3    rocephine day 2  4    Levophed  5    May need renal consult at some point  6    Lasix bid  --  Critical care time 41 minutes  More than 50% of the time documented was spent in face-to-face contact with the patient and in the care of the patient on the floor/unit where the patient is located.     Ophelia Pierce MD

## 2020-10-03 NOTE — PROGRESS NOTES
POD 1 Day Post-Op    Procedure:  Procedure(s):  BRONCHOSCOPY ROOM 104  BRONCHIAL ALVEOLAR LAVAGE      Subjective:     Sedated on vent      Objective:     Patient Vitals for the past 8 hrs:   BP Temp Pulse Resp SpO2 Weight   10/03/20 0859 (!) 101/51  88 18 96 %    10/03/20 0815 (!) 98/48  84 18 93 %    10/03/20 0759 (!) 95/53 97.9 °F (36.6 °C) 79 (!) 7 96 %    10/03/20 0744 (!) 89/50  88 19 94 %    10/03/20 0740   85 18 96 %    10/03/20 0729 (!) 94/54  85 18 96 %    10/03/20 0715 (!) 95/54  85 18 96 %    10/03/20 0714   86 12 96 %    10/03/20 0700 (!) 96/54  84 21 96 %    10/03/20 0645 (!) 99/54  84 16 96 %    10/03/20 0630 (!) 95/52  84 18 96 %    10/03/20 0620 (!) 92/53 98.1 °F (36.7 °C) 83 14 96 %    10/03/20 0615 (!) 90/49  83 15 96 %    10/03/20 0600 (!) 91/55  83 23 98 %    10/03/20 0555 (!) 84/52  79 18 96 %    10/03/20 0550 (!) 82/48  75 18 96 %    10/03/20 0545 (!) 85/50  75 18 96 % 185 lb 10 oz (84.2 kg)   10/03/20 0540 (!) 82/45  76 19 95 %    10/03/20 0500 (!) 119/57  78 18 95 %    10/03/20 0445 (!) 118/55  77 18 96 %    10/03/20 0430 127/60  78 18 96 %    10/03/20 0415 121/60  79 18 95 %    10/03/20 0405   78 18 95 %    10/03/20 0400 117/62  79 18 96 %    10/03/20 0345 118/61  79 14 96 %    10/03/20 0330 117/63  79 18 97 %    10/03/20 0315 (!) 117/59  80 14 96 %    10/03/20 0300 (!) 120/58  80 14 96 %    10/03/20 0245 (!) 118/56  80 18 96 %    10/03/20 0230 (!) 119/56  81 18 96 %    10/03/20 0215 (!) 118/56  81 18 96 %    10/03/20 0200 123/60  81 18 95 %    10/03/20 0145 (!) 114/55  82 11 95 %      Temp (24hrs), Av.5 °F (36.9 °C), Min:97.9 °F (36.6 °C), Max:99.1 °F (37.3 °C)        Hemodynamics    PAP  CO (l/min): 4.8 l/min CO  CI (l/min/m2): 2.6 l/min/m2 CI    10/03 0701 - 10/03 1900  In: 80   Out: 65 [Urine:65]  10/01 1901 - 10/03 0700  In: 3073.1 [I.V.:.1]  Out: 2310 [Urine:2310]    CT Drainage              total of all CT's    Heart:  regular rate and rhythm, S1, S2 normal, no murmur, click, rub or gallop  Lung:  clear to auscultation bilaterally  Neuro: Grossly non focal  Incisions: Clean, dry, and intact    Labs:  Recent Results (from the past 24 hour(s))   POC G3    Collection Time: 10/02/20 10:44 AM   Result Value Ref Range    Device: VENT      FIO2 (POC) 100 %    pH (POC) 7.44 7.35 - 7.45      pCO2 (POC) 28.3 (L) 35 - 45 MMHG    pO2 (POC) 81 75 - 100 MMHG    HCO3 (POC) 19.2 (L) 22 - 26 MMOL/L    sO2 (POC) 97 95 - 98 %    Base deficit (POC) 4 mmol/L    Mode Pressure regulated volume control      Tidal volume 500 ml    Set Rate 22 bpm    PEEP/CPAP (POC) 14 cmH2O    Allens test (POC) YES      Site LEFT RADIAL      Specimen type (POC) ARTERIAL      Performed by Gloria     CO2, POC 20 MMOL/L    Exhaled minute volume 10.60 L/min    COLLECT TIME 1,040     GLUCOSE, POC    Collection Time: 10/02/20 11:37 AM   Result Value Ref Range    Glucose (POC) 145 (H) 65 - 100 mg/dL   BRONCH. LAVAGE DIFF.     Collection Time: 10/02/20 12:45 PM   Result Value Ref Range    BRCH LAVAGE DIFF          BRCH NEUTROPHIL 85 %    BRCH LYMPHS 7 %    BRCH MACROPHAGES 8 %    BRCH EOSINS 0 %   GLUCOSE, POC    Collection Time: 10/02/20  4:08 PM   Result Value Ref Range    Glucose (POC) 129 (H) 65 - 100 mg/dL   GLUCOSE, POC    Collection Time: 10/02/20  9:21 PM   Result Value Ref Range    Glucose (POC) 129 (H) 65 - 100 mg/dL   POC G3    Collection Time: 10/03/20  4:03 AM   Result Value Ref Range    Device: VENT      FIO2 (POC) 85 %    pH (POC) 7.42 7.35 - 7.45      pCO2 (POC) 38.8 35 - 45 MMHG    pO2 (POC) 81 75 - 100 MMHG    HCO3 (POC) 24.9 22 - 26 MMOL/L    sO2 (POC) 96 95 - 98 %    Base excess (POC) 0 mmol/L    Mode ASSIST CONTROL      Set Rate 18 bpm    PEEP/CPAP (POC) 14 cmH2O    Allens test (POC) NOT APPLICABLE      Inspiratory Time 1.11 sec    Site RIGHT BRACHIAL      Specimen type (POC) ARTERIAL      Performed by Tabitha STEVENSON, POC 26 MMOL/L    Pressure control 16      Respiratory comment: NurseNotified     Exhaled minute volume 8.20 L/min    COLLECT TIME 400     MAGNESIUM    Collection Time: 10/03/20  4:17 AM   Result Value Ref Range    Magnesium 2.3 1.8 - 2.4 mg/dL   METABOLIC PANEL, BASIC    Collection Time: 10/03/20  4:17 AM   Result Value Ref Range    Sodium 140 136 - 145 mmol/L    Potassium 3.3 (L) 3.5 - 5.1 mmol/L    Chloride 105 98 - 107 mmol/L    CO2 29 21 - 32 mmol/L    Anion gap 6 (L) 7 - 16 mmol/L    Glucose 142 (H) 65 - 100 mg/dL    BUN 36 (H) 8 - 23 MG/DL    Creatinine 1.68 (H) 0.6 - 1.0 MG/DL    GFR est AA 40 (L) >60 ml/min/1.73m2    GFR est non-AA 33 (L) >60 ml/min/1.73m2    Calcium 7.8 (L) 8.3 - 10.4 MG/DL   CBC W/O DIFF    Collection Time: 10/03/20  4:17 AM   Result Value Ref Range    WBC 15.2 (H) 4.3 - 11.1 K/uL    RBC 3.52 (L) 4.05 - 5.2 M/uL    HGB 10.2 (L) 11.7 - 15.4 g/dL    HCT 31.8 (L) 35.8 - 46.3 %    MCV 90.3 79.6 - 97.8 FL    MCH 29.0 26.1 - 32.9 PG    MCHC 32.1 31.4 - 35.0 g/dL    RDW 15.1 (H) 11.9 - 14.6 %    PLATELET 511 936 - 891 K/uL    MPV 11.3 9.4 - 12.3 FL    ABSOLUTE NRBC 0.00 0.0 - 0.2 K/uL   GLUCOSE, POC    Collection Time: 10/03/20  6:12 AM   Result Value Ref Range    Glucose (POC) 181 (H) 65 - 100 mg/dL       Assessment:     Principal Problem:    S/P CABG x 4 (9/29/2020)    Active Problems:    Paroxysmal atrial fibrillation (HCC) (9/18/2020)      Paroxysmal A-fib (HCC) (9/29/2020)      Atherosclerosis of native coronary artery of native heart with unstable angina pectoris ([facility] [address] (9/29/2020)      CAD (coronary artery disease) (9/29/2020)      AMOR on CPAP (9/30/2020)      Pulmonary edema (10/1/2020)      Anxiety (10/1/2020)      Acute respiratory failure with hypoxia (HCC) (10/2/2020)      Bilateral pulmonary infiltrates on chest x-ray (10/2/2020)      Hypotension (10/3/2020)        Plan/Recommendations/Medical Decision Making:     Soft BP with rising Cr, stop Lasix, supportive care, stable    See orders

## 2020-10-03 NOTE — PROGRESS NOTES
Switched to pressure control of 16. Respiratory pattern is more regular and patient seems more comfortable with this mode.

## 2020-10-03 NOTE — PROGRESS NOTES
Pharmacokinetic Consult to Pharmacist    Jose Churchilles is a 56 y.o. female being treated for with vancomycin    Height: 5' 3\" (160 cm)  Weight: 84.2 kg (185 lb 10 oz)  Lab Results   Component Value Date/Time    BUN 36 (H) 10/03/2020 04:17 AM    Creatinine 1.68 (H) 10/03/2020 04:17 AM    WBC 15.2 (H) 10/03/2020 04:17 AM    Procalcitonin 6.14 10/02/2020 04:34 AM      Estimated Creatinine Clearance: 35.7 mL/min (A) (based on SCr of 1.68 mg/dL (H)). No results found for: Christina Davis    Day 1 of vancomycin. Goal trough is 15-20 mcg/ml      Loaded pt with vancomycin 2000 mg x 1. Scr rising, will place on intermittent vancomycin dosing. Check random 24 hours post load    Pharmacy will continue to follow patient and order levels when clinically indicated.     Thank you,  Iman Dixon  Clinical Pharmacist  [phone]

## 2020-10-03 NOTE — PROGRESS NOTES
Ventilator check complete; patient has a #7.5 ET tube secured at the 23 at the lip. Patient is sedated. Patient is not able to follow commands. Breath sounds are coarse. Trachea is midline, Negative for subcutaneous air, and chest excursion is symmetric. Patient is also Negative for cyanosis and is Positive for pitting edema. All alarms are set and audible. Resuscitation bag is at the head of the bed. Ventilator Settings  Mode FIO2 Rate Tidal Volume Pressure PEEP I:E Ratio   Pressure control  85 %   18  16 cmH20  14 cm H20  1:2      Peak airway pressure: 29.9 cm H2O   Minute ventilation: 8.8 l/min     ABG: No results for input(s): PH, PCO2, PO2, HCO3 in the last 72 hours.       Raford Crumbly

## 2020-10-04 ENCOUNTER — APPOINTMENT (OUTPATIENT)
Dept: GENERAL RADIOLOGY | Age: 60
DRG: 228 | End: 2020-10-04
Attending: INTERNAL MEDICINE
Payer: COMMERCIAL

## 2020-10-04 ENCOUNTER — APPOINTMENT (OUTPATIENT)
Dept: CT IMAGING | Age: 60
DRG: 228 | End: 2020-10-04
Attending: INTERNAL MEDICINE
Payer: COMMERCIAL

## 2020-10-04 LAB
ANION GAP SERPL CALC-SCNC: 5 MMOL/L (ref 7–16)
ARTERIAL PATENCY WRIST A: ABNORMAL
ATRIAL RATE: 28 BPM
BACTERIA SPEC CULT: NORMAL
BACTERIA SPEC CULT: NORMAL
BASE EXCESS BLD CALC-SCNC: 5 MMOL/L
BDY SITE: ABNORMAL
BUN SERPL-MCNC: 31 MG/DL (ref 8–23)
CALCIUM SERPL-MCNC: 7.7 MG/DL (ref 8.3–10.4)
CALCULATED R AXIS, ECG10: 77 DEGREES
CALCULATED T AXIS, ECG11: 24 DEGREES
CHLORIDE SERPL-SCNC: 104 MMOL/L (ref 98–107)
CO2 BLD-SCNC: 31 MMOL/L
CO2 SERPL-SCNC: 32 MMOL/L (ref 21–32)
COLLECT TIME,HTIME: 156
CREAT SERPL-MCNC: 1.63 MG/DL (ref 0.6–1)
DIAGNOSIS, 93000: NORMAL
ERYTHROCYTE [DISTWIDTH] IN BLOOD BY AUTOMATED COUNT: 15.2 % (ref 11.9–14.6)
EXHALED MINUTE VOLUME, VE: 6.9 L/MIN
GAS FLOW.O2 O2 DELIVERY SYS: ABNORMAL L/MIN
GAS FLOW.O2 SETTING OXYMISER: 18 BPM
GLUCOSE BLD STRIP.AUTO-MCNC: 172 MG/DL (ref 65–100)
GLUCOSE BLD STRIP.AUTO-MCNC: 174 MG/DL (ref 65–100)
GLUCOSE BLD STRIP.AUTO-MCNC: 175 MG/DL (ref 65–100)
GLUCOSE BLD STRIP.AUTO-MCNC: 178 MG/DL (ref 65–100)
GLUCOSE SERPL-MCNC: 158 MG/DL (ref 65–100)
GRAM STN SPEC: NORMAL
HCO3 BLD-SCNC: 29.9 MMOL/L (ref 22–26)
HCT VFR BLD AUTO: 29.4 % (ref 35.8–46.3)
HGB BLD-MCNC: 9.4 G/DL (ref 11.7–15.4)
INSPIRATION.DURATION SETTING TIME VENT: 1.11 SEC
MAGNESIUM SERPL-MCNC: 2.4 MG/DL (ref 1.8–2.4)
MCH RBC QN AUTO: 28.7 PG (ref 26.1–32.9)
MCHC RBC AUTO-ENTMCNC: 32 G/DL (ref 31.4–35)
MCV RBC AUTO: 89.6 FL (ref 79.6–97.8)
NRBC # BLD: 0 K/UL (ref 0–0.2)
O2/TOTAL GAS SETTING VFR VENT: 70 %
PCO2 BLD: 45.6 MMHG (ref 35–45)
PEEP RESPIRATORY: 14 CMH2O
PH BLD: 7.43 [PH] (ref 7.35–7.45)
PLATELET # BLD AUTO: 194 K/UL (ref 150–450)
PMV BLD AUTO: 10.9 FL (ref 9.4–12.3)
PO2 BLD: 232 MMHG (ref 75–100)
POTASSIUM SERPL-SCNC: 3.2 MMOL/L (ref 3.5–5.1)
PRESSURE CONTROL, IPC: 16
Q-T INTERVAL, ECG07: 446 MS
QRS DURATION, ECG06: 112 MS
QTC CALCULATION (BEZET), ECG08: 524 MS
RBC # BLD AUTO: 3.28 M/UL (ref 4.05–5.2)
SAO2 % BLD: 100 % (ref 95–98)
SERVICE CMNT-IMP: ABNORMAL
SERVICE CMNT-IMP: ABNORMAL
SERVICE CMNT-IMP: NORMAL
SERVICE CMNT-IMP: NORMAL
SODIUM SERPL-SCNC: 141 MMOL/L (ref 136–145)
SPECIMEN TYPE: ABNORMAL
VANCOMYCIN SERPL-MCNC: 10.7 UG/ML
VENTILATION MODE VENT: ABNORMAL
VENTRICULAR RATE, ECG03: 83 BPM
WBC # BLD AUTO: 11.1 K/UL (ref 4.3–11.1)

## 2020-10-04 PROCEDURE — 74011000250 HC RX REV CODE- 250: Performed by: INTERNAL MEDICINE

## 2020-10-04 PROCEDURE — 71045 X-RAY EXAM CHEST 1 VIEW: CPT

## 2020-10-04 PROCEDURE — 93005 ELECTROCARDIOGRAM TRACING: CPT | Performed by: THORACIC SURGERY (CARDIOTHORACIC VASCULAR SURGERY)

## 2020-10-04 PROCEDURE — 36592 COLLECT BLOOD FROM PICC: CPT

## 2020-10-04 PROCEDURE — 83735 ASSAY OF MAGNESIUM: CPT

## 2020-10-04 PROCEDURE — 77030010545

## 2020-10-04 PROCEDURE — 74011250636 HC RX REV CODE- 250/636: Performed by: INTERNAL MEDICINE

## 2020-10-04 PROCEDURE — 99291 CRITICAL CARE FIRST HOUR: CPT | Performed by: INTERNAL MEDICINE

## 2020-10-04 PROCEDURE — 82803 BLOOD GASES ANY COMBINATION: CPT

## 2020-10-04 PROCEDURE — 74011250637 HC RX REV CODE- 250/637: Performed by: PHYSICIAN ASSISTANT

## 2020-10-04 PROCEDURE — 74011250637 HC RX REV CODE- 250/637: Performed by: THORACIC SURGERY (CARDIOTHORACIC VASCULAR SURGERY)

## 2020-10-04 PROCEDURE — 74011000258 HC RX REV CODE- 258: Performed by: INTERNAL MEDICINE

## 2020-10-04 PROCEDURE — 74011250636 HC RX REV CODE- 250/636: Performed by: THORACIC SURGERY (CARDIOTHORACIC VASCULAR SURGERY)

## 2020-10-04 PROCEDURE — 2709999900 HC NON-CHARGEABLE SUPPLY

## 2020-10-04 PROCEDURE — C9113 INJ PANTOPRAZOLE SODIUM, VIA: HCPCS | Performed by: INTERNAL MEDICINE

## 2020-10-04 PROCEDURE — 77030018798 HC PMP KT ENTRL FED COVD -A

## 2020-10-04 PROCEDURE — 80202 ASSAY OF VANCOMYCIN: CPT

## 2020-10-04 PROCEDURE — 85027 COMPLETE CBC AUTOMATED: CPT

## 2020-10-04 PROCEDURE — 74011636637 HC RX REV CODE- 636/637: Performed by: THORACIC SURGERY (CARDIOTHORACIC VASCULAR SURGERY)

## 2020-10-04 PROCEDURE — 36600 WITHDRAWAL OF ARTERIAL BLOOD: CPT

## 2020-10-04 PROCEDURE — 80048 BASIC METABOLIC PNL TOTAL CA: CPT

## 2020-10-04 PROCEDURE — 82962 GLUCOSE BLOOD TEST: CPT

## 2020-10-04 PROCEDURE — 71250 CT THORAX DX C-: CPT

## 2020-10-04 PROCEDURE — 94003 VENT MGMT INPAT SUBQ DAY: CPT

## 2020-10-04 PROCEDURE — 65610000006 HC RM INTENSIVE CARE

## 2020-10-04 RX ORDER — POTASSIUM CHLORIDE 29.8 MG/ML
40 INJECTION INTRAVENOUS ONCE
Status: COMPLETED | OUTPATIENT
Start: 2020-10-04 | End: 2020-10-04

## 2020-10-04 RX ORDER — VANCOMYCIN/0.9 % SOD CHLORIDE 1.5G/250ML
1500 PLASTIC BAG, INJECTION (ML) INTRAVENOUS EVERY 24 HOURS
Status: DISCONTINUED | OUTPATIENT
Start: 2020-10-04 | End: 2020-10-05

## 2020-10-04 RX ADMIN — DEXTROSE MONOHYDRATE 2 MCG/MIN: 50 INJECTION, SOLUTION INTRAVENOUS at 10:44

## 2020-10-04 RX ADMIN — Medication 1 AMPULE: at 07:48

## 2020-10-04 RX ADMIN — ACETAMINOPHEN 650 MG: 325 TABLET, FILM COATED ORAL at 15:21

## 2020-10-04 RX ADMIN — PIPERACILLIN SODIUM AND TAZOBACTAM SODIUM 3.38 G: 3; .375 INJECTION, POWDER, LYOPHILIZED, FOR SOLUTION INTRAVENOUS at 22:47

## 2020-10-04 RX ADMIN — INSULIN HUMAN 2 UNITS: 100 INJECTION, SOLUTION PARENTERAL at 11:41

## 2020-10-04 RX ADMIN — Medication 10 ML: at 21:26

## 2020-10-04 RX ADMIN — PIPERACILLIN SODIUM AND TAZOBACTAM SODIUM 3.38 G: 3; .375 INJECTION, POWDER, LYOPHILIZED, FOR SOLUTION INTRAVENOUS at 07:47

## 2020-10-04 RX ADMIN — DOCUSATE SODIUM 50 MG AND SENNOSIDES 8.6 MG 2 TABLET: 8.6; 5 TABLET, FILM COATED ORAL at 07:50

## 2020-10-04 RX ADMIN — INSULIN HUMAN 2 UNITS: 100 INJECTION, SOLUTION PARENTERAL at 21:25

## 2020-10-04 RX ADMIN — INSULIN HUMAN 2 UNITS: 100 INJECTION, SOLUTION PARENTERAL at 16:18

## 2020-10-04 RX ADMIN — ASPIRIN 81 MG: 81 TABLET, CHEWABLE ORAL at 07:49

## 2020-10-04 RX ADMIN — ROSUVASTATIN CALCIUM 40 MG: 20 TABLET, COATED ORAL at 21:19

## 2020-10-04 RX ADMIN — PIPERACILLIN SODIUM AND TAZOBACTAM SODIUM 3.38 G: 3; .375 INJECTION, POWDER, LYOPHILIZED, FOR SOLUTION INTRAVENOUS at 15:22

## 2020-10-04 RX ADMIN — INSULIN HUMAN 2 UNITS: 100 INJECTION, SOLUTION PARENTERAL at 06:37

## 2020-10-04 RX ADMIN — ACETAMINOPHEN 650 MG: 325 TABLET, FILM COATED ORAL at 11:19

## 2020-10-04 RX ADMIN — DOCUSATE SODIUM 50 MG AND SENNOSIDES 8.6 MG 2 TABLET: 8.6; 5 TABLET, FILM COATED ORAL at 21:19

## 2020-10-04 RX ADMIN — Medication 10 ML: at 06:37

## 2020-10-04 RX ADMIN — POTASSIUM CHLORIDE 40 MEQ: 400 INJECTION, SOLUTION INTRAVENOUS at 07:55

## 2020-10-04 RX ADMIN — PANTOPRAZOLE SODIUM 40 MG: 40 INJECTION, POWDER, FOR SOLUTION INTRAVENOUS at 07:48

## 2020-10-04 RX ADMIN — Medication 10 ML: at 14:26

## 2020-10-04 RX ADMIN — VANCOMYCIN HYDROCHLORIDE 1500 MG: 10 INJECTION, POWDER, LYOPHILIZED, FOR SOLUTION INTRAVENOUS at 16:13

## 2020-10-04 RX ADMIN — Medication 1 AMPULE: at 21:19

## 2020-10-04 RX ADMIN — SODIUM CHLORIDE 75 MCG/HR: 900 INJECTION, SOLUTION INTRAVENOUS at 17:45

## 2020-10-04 RX ADMIN — SODIUM CHLORIDE 75 MCG/HR: 900 INJECTION, SOLUTION INTRAVENOUS at 02:30

## 2020-10-04 NOTE — PROGRESS NOTES
Ventilator check complete; patient has a #7.5 ET tube secured at the 24 at the lip. Patient is sedated. Patient is not able to follow commands. Breath sounds are coarse. Trachea is midline, Negative for subcutaneous air, and chest excursion is symmetric. Patient is also Negative for cyanosis and is Negative for pitting edema. All alarms are set and audible. Resuscitation bag is at the head of the bed. Ventilator Settings  Mode FIO2 Rate Tidal Volume Pressure PEEP I:E Ratio   Pressure control  70 %(weaned from 75)    450 ml  14 cm H2O  14 cm H20  1:2      Peak airway pressure: 29.9 cm H2O   Minute ventilation: 7.9 l/min     ABG: No results for input(s): PH, PCO2, PO2, HCO3 in the last 72 hours.       Elaine Priest, RT

## 2020-10-04 NOTE — PROGRESS NOTES
POD 2 Days Post-Op    Procedure:  Procedure(s):  BRONCHOSCOPY ROOM 104  BRONCHIAL ALVEOLAR LAVAGE      Subjective:     Sedated on vent      Objective:     Patient Vitals for the past 8 hrs:   BP Pulse Resp SpO2   10/04/20 0717  81 18 98 %   10/04/20 0630 124/60 79 18 98 %   10/04/20 0615  79 19 98 %   10/04/20 0600 (!) 118/58 79 18 98 %   10/04/20 0545  80 18 97 %   10/04/20 0530 (!) 115/56 80 18 97 %   10/04/20 0515  80 18 97 %   10/04/20 0500 (!) 118/58 80 21 96 %   10/04/20 0445 134/64 80 18 96 %   10/04/20 0430  81 18 96 %   10/04/20 0415  74 19 98 %   10/04/20 0400 (!) 119/56 83 21 99 %   10/04/20 0345 (!) 140/63 82 18 99 %   10/04/20 0330 (!) 131/59 83 18 99 %   10/04/20 0315 134/61 83 18 99 %   10/04/20 0300 (!) 125/58 85 18 99 %   10/04/20 0245 (!) 116/57 84 18 99 %     Temp (24hrs), Av.4 °F (37.4 °C), Min:98.9 °F (37.2 °C), Max:100.5 °F (38.1 °C)        Hemodynamics    PAP  CO (l/min): 4.8 l/min CO  CI (l/min/m2): 2.6 l/min/m2 CI    No intake/output data recorded.   10/02 1901 - 10/04 0700  In: 3673.1 [I.V.:715.1]  Out: 5011 [Urine:2685]    CT Drainage              total of all CT's    Heart:  regular rate and rhythm, S1, S2 normal, no murmur, click, rub or gallop  Lung:  clear to auscultation bilaterally  Neuro: Grossly non focal  Incisions: Clean, dry, and intact    Labs:  Recent Results (from the past 24 hour(s))   NT-PRO BNP    Collection Time: 10/03/20  1:48 PM   Result Value Ref Range    NT pro-BNP 6,526 (H) 5 - 125 PG/ML   GLUCOSE, POC    Collection Time: 10/03/20  6:15 PM   Result Value Ref Range    Glucose (POC) 188 (H) 65 - 100 mg/dL   GLUCOSE, POC    Collection Time: 10/03/20  9:59 PM   Result Value Ref Range    Glucose (POC) 178 (H) 65 - 100 mg/dL   POC G3    Collection Time: 10/04/20  1:55 AM   Result Value Ref Range    Device: VENT      FIO2 (POC) 70 %    pH (POC) 7.43 7.35 - 7.45      pCO2 (POC) 45.6 (H) 35 - 45 MMHG    pO2 (POC) 232 (H) 75 - 100 MMHG    HCO3 (POC) 29.9 (H) 22 - 26 MMOL/L    sO2 (POC) 100 (H) 95 - 98 %    Base excess (POC) 5 mmol/L    Mode ASSIST CONTROL      Set Rate 18 bpm    PEEP/CPAP (POC) 14 cmH2O    Allens test (POC) NOT APPLICABLE      Inspiratory Time 1.11 sec    Site RIGHT BRACHIAL      Specimen type (POC) ARTERIAL      Performed by Tabitha STEVENSON, POC 31 MMOL/L    Pressure control 16      Respiratory comment: NurseNotified     Exhaled minute volume 6.90 L/min    COLLECT TIME 156     MAGNESIUM    Collection Time: 10/04/20  4:20 AM   Result Value Ref Range    Magnesium 2.4 1.8 - 2.4 mg/dL   METABOLIC PANEL, BASIC    Collection Time: 10/04/20  4:20 AM   Result Value Ref Range    Sodium 141 136 - 145 mmol/L    Potassium 3.2 (L) 3.5 - 5.1 mmol/L    Chloride 104 98 - 107 mmol/L    CO2 32 21 - 32 mmol/L    Anion gap 5 (L) 7 - 16 mmol/L    Glucose 158 (H) 65 - 100 mg/dL    BUN 31 (H) 8 - 23 MG/DL    Creatinine 1.63 (H) 0.6 - 1.0 MG/DL    GFR est AA 41 (L) >60 ml/min/1.73m2    GFR est non-AA 34 (L) >60 ml/min/1.73m2    Calcium 7.7 (L) 8.3 - 10.4 MG/DL   CBC W/O DIFF    Collection Time: 10/04/20  4:25 AM   Result Value Ref Range    WBC 11.1 4.3 - 11.1 K/uL    RBC 3.28 (L) 4.05 - 5.2 M/uL    HGB 9.4 (L) 11.7 - 15.4 g/dL    HCT 29.4 (L) 35.8 - 46.3 %    MCV 89.6 79.6 - 97.8 FL    MCH 28.7 26.1 - 32.9 PG    MCHC 32.0 31.4 - 35.0 g/dL    RDW 15.2 (H) 11.9 - 14.6 %    PLATELET 301 608 - 530 K/uL    MPV 10.9 9.4 - 12.3 FL    ABSOLUTE NRBC 0.00 0.0 - 0.2 K/uL   GLUCOSE, POC    Collection Time: 10/04/20  6:36 AM   Result Value Ref Range    Glucose (POC) 172 (H) 65 - 100 mg/dL   EKG, 12 LEAD, SUBSEQUENT    Collection Time: 10/04/20  7:24 AM   Result Value Ref Range    Ventricular Rate 83 BPM    Atrial Rate 28 BPM    QRS Duration 112 ms    Q-T Interval 446 ms    QTC Calculation (Bezet) 524 ms    Calculated R Axis 77 degrees    Calculated T Axis 24 degrees    Diagnosis       Accelerated Junctional rhythm  Possible Inferior infarct , age undetermined  Prolonged QT  Abnormal ECG  When compared with ECG of 29-SEP-2020 14:50,  Junctional rhythm has replaced Electronic ventricular pacemaker         Assessment:     Principal Problem:    S/P CABG x 4 (9/29/2020)    Active Problems:    Paroxysmal atrial fibrillation (HCC) (9/18/2020)      Paroxysmal A-fib (HCC) (9/29/2020)      Atherosclerosis of native coronary artery of native heart with unstable angina pectoris ([address] (9/29/2020)      CAD (coronary artery disease) (9/29/2020)      AMOR on CPAP (9/30/2020)      Pulmonary edema (10/1/2020)      Anxiety (10/1/2020)      Acute respiratory failure with hypoxia ([address] (10/2/2020)      Bilateral pulmonary infiltrates on chest x-ray (10/2/2020)      Hypotension (10/3/2020)        Plan/Recommendations/Medical Decision Making:     Weaning vent, supportive care    See orders

## 2020-10-04 NOTE — PROGRESS NOTES
Critical Care Daily Progress Note: 10/4/2020  Admission Date: 9/29/2020     The patient's chart is reviewed and the patient is discussed with the staff.       57 y/o s/p cabg 9/29,   reintubated  10/2 with pulmonary edema  Subjective:   I/o 2953/1955 -negative 1 L since admit, remains on vent but now down to 40% fio2  Remains critically ill    Current Facility-Administered Medications   Medication Dose Route Frequency    potassium chloride 40 mEq IVPB  40 mEq IntraVENous ONCE    Vancomycin Intermittent Dosing   Other Rx Dosing/Monitoring    piperacillin-tazobactam (ZOSYN) 3.375 g in 0.9% sodium chloride (MBP/ADV) 100 mL  3.375 g IntraVENous Q8H    LORazepam (ATIVAN) tablet 1 mg  1 mg Oral Q4H PRN    pantoprazole (PROTONIX) 40 mg in 0.9% sodium chloride 10 mL injection  40 mg IntraVENous Q24H    fentaNYL in normal saline (pf) 25 mcg/mL infusion  0-200 mcg/hr IntraVENous TITRATE    NOREPINephrine (LEVOPHED) 4 mg in 5% dextrose 250 mL infusion  0.5-16 mcg/min IntraVENous TITRATE    midazolam in normal saline (VERSED) 1 mg/mL infusion  0-10 mg/hr IntraVENous TITRATE    sodium chloride (NS) flush 5-40 mL  5-40 mL IntraVENous Q8H    sodium chloride (NS) flush 5-40 mL  5-40 mL IntraVENous PRN    midazolam (VERSED) injection 2 mg  2 mg IntraVENous Multiple    NUTRITIONAL SUPPORT ELECTROLYTE PRN ORDERS   Does Not Apply PRN    senna-docusate (PERICOLACE) 8.6-50 mg per tablet 2 Tab  2 Tab Oral BID    LORazepam (ATIVAN) injection 1 mg  1 mg IntraVENous Q4H PRN    traMADoL (ULTRAM) tablet 100 mg  100 mg Oral Q6H PRN    oxyCODONE-acetaminophen (PERCOCET) 5-325 mg per tablet 1 Tab  1 Tab Oral Q4H PRN    insulin regular (NOVOLIN R, HUMULIN R) injection   SubCUTAneous AC&HS    alcohol 62% (NOZIN) nasal  1 Ampule  1 Ampule Topical Q12H    rosuvastatin (CRESTOR) tablet 40 mg  40 mg Oral QHS    dextrose 5% - 0.45% NaCl with KCl 20 mEq/L infusion  25 mL/hr IntraVENous CONTINUOUS    sodium chloride (NS) flush 5-40 mL  5-40 mL IntraVENous Q8H    sodium chloride (NS) flush 5-40 mL  5-40 mL IntraVENous PRN    naloxone (NARCAN) injection 0.4 mg  0.4 mg IntraVENous PRN    sodium bicarbonate (8.4%) injection 50 mEq  50 mEq IntraVENous PRN    nitroglycerin (Tridil) 200 mcg/ml infusion   mcg/min IntraVENous TITRATE    amiodarone (CORDARONE) tablet 200 mg  200 mg Oral Q12H    metoprolol tartrate (LOPRESSOR) tablet 25 mg  25 mg Oral Q12H    ondansetron (ZOFRAN) injection 4 mg  4 mg IntraVENous Q4H PRN    dextrose (D50W) injection syrg 12.5 g  25 mL IntraVENous PRN    aspirin chewable tablet 81 mg  81 mg Oral DAILY    magnesium sulfate 1 g/100 ml IVPB (premix or compounded)  1 g IntraVENous PRN    EPINEPHrine (ADRENALIN) 4 mg in 0.9% sodium chloride 250 mL infusion (PRE-MIX)  0.01-0.05 mcg/kg/min IntraVENous TITRATE    acetaminophen (TYLENOL) tablet 650 mg  650 mg Oral Q4H PRN    niCARdipine in Saline (CARDENE) 25 MG/250 mL infusion kit  5-15 mg/hr IntraVENous TITRATE       Review of Systems   Unobtainable due to patient status. Objective:     Vitals:    10/04/20 0600 10/04/20 0615 10/04/20 0630 10/04/20 0717   BP: (!) 118/58  124/60    Pulse: 79 79 79 81   Resp: 18 19 18 18   Temp:       SpO2: 98% 98% 98% 98%   Weight:       Height:             Intake/Output Summary (Last 24 hours) at 10/4/2020 0727  Last data filed at 10/4/2020 0630  Gross per 24 hour   Intake 2953.12 ml   Output 1955 ml   Net 998.12 ml         Physical Exam:          Constitutional:  intubated and mechanically ventilated.   EENMT:  Sclera clear, pupils equal, oral mucosa moist  Respiratory: crackles  Cardiovascular:  RRR with no M,G,R;  Gastrointestinal:  soft with no tenderness; positive bowel sounds present  Musculoskeletal:  warm with no cyanosis, no lower extremity edema  Skin:  no jaundice or ecchymosis  Neurologic: no gross neuro deficits     Psychiatric:  unresponsvie     LINES:    ETT    DRIPS:    Versed , fentanyl, levophed 4    CXR:        Ventilator Settings  Mode FIO2 Rate Tidal Volume Pressure PEEP   Pressure control  45 %    450 ml  14 cm H2O  14 cm H20      Peak airway pressure: 31.1 cm H2O   Minute ventilation: 7.6 l/min     ABG:   Recent Labs     10/04/20  0155 10/03/20  0403 10/02/20  1044   PHI 7.43 7.42 7.44   PCO2I 45.6* 38.8 28.3*   PO2I 232* 81 81   HCO3I 29.9* 24.9 19.2*       LAB  Recent Labs     10/04/20  0636 10/03/20  2159 10/03/20  1815 10/03/20  1031 10/03/20  0612   GLUCPOC 172* 178* 188* 202* 181*     Recent Labs     10/04/20  0425 10/03/20  0417 10/02/20  0434   WBC 11.1 15.2* 18.4*   HGB 9.4* 10.2* 10.7*   HCT 29.4* 31.8* 31.5*    188 149*     Recent Labs     10/04/20  0420 10/03/20  0417 10/02/20  0434    140 138   K 3.2* 3.3* 3.7    105 104   CO2 32 29 27   * 142* 153*   BUN 31* 36* 25*   CREA 1.63* 1.68* 1.32*   MG 2.4 2.3 2.2   CA 7.7* 7.8* 7.5*     No results for input(s): LCAD, LAC in the last 72 hours.       Patient Active Problem List   Diagnosis Code    Paroxysmal atrial fibrillation (HCC) I48.0    Essential hypertension I10    Mixed hyperlipidemia E78.2    Uterine cancer ([facility] [address] C55    Chest pain R07.9    Atrial fibrillation (HCC) I48.91    Paroxysmal A-fib (HCC) I48.0    Atherosclerosis of native coronary artery of native heart with unstable angina pectoris (HCC) I25.110    S/P CABG x 4 Z95.1    Suspected sleep apnea R29.818    CAD (coronary artery disease) I25.10    AMOR on CPAP G47.33, Z99.89    Pulmonary edema J81.1    Anxiety F41.9    Acute respiratory failure with hypoxia (HCC) J96.01    Bilateral pulmonary infiltrates on chest x-ray R91.8    Hypotension I95.9         Assessment:  (Medical Decision Making)     Hospital Problems  Date Reviewed: 9/22/2020          Codes Class Noted POA    Hypotension ICD-10-CM: I95.9  ICD-9-CM: 458.9  10/3/2020 Unknown    On levophed    Acute respiratory failure with hypoxia ([facility] [facility] ICD-10-CM: J96.01  ICD-9-CM: 518.81  10/2/2020 Unknown    Vent support- critically ill    Bilateral pulmonary infiltrates on chest x-ray ICD-10-CM: R91.8  ICD-9-CM: 793.19  10/2/2020 Unknown    Now worse on left than R    Pulmonary edema ICD-10-CM: J81.1  ICD-9-CM: 514  10/1/2020 Unknown    + fluid balance yesterday    Anxiety ICD-10-CM: F41.9  ICD-9-CM: 300.00  10/1/2020 Unknown        AMOR on CPAP ICD-10-CM: G47.33, Z99.89  ICD-9-CM: 327.23, V46.8  9/30/2020 Unknown        Paroxysmal A-fib (HCC) ICD-10-CM: I48.0  ICD-9-CM: 427.31  9/29/2020 Unknown        Atherosclerosis of native coronary artery of native heart with unstable angina pectoris (HCC) ICD-10-CM: I25.110  ICD-9-CM: 414.01, 411.1  9/29/2020 Unknown        * (Principal) S/P CABG x 4 ICD-10-CM: Z95.1  ICD-9-CM: V45.81  9/29/2020 Unknown        CAD (coronary artery disease) ICD-10-CM: I25.10  ICD-9-CM: 414.00  9/29/2020 Unknown        Paroxysmal atrial fibrillation ([facility] [address] ICD-10-CM: I48.0  ICD-9-CM: 427.31  9/18/2020 Unknown              Plan:  (Medical Decision Making)   1     Vent support , consider pressure support trial  2     Chest ct  3      levophed  4     Sedation vacation soon,   --  Critical care time 38 minutes  More than 50% of the time documented was spent in face-to-face contact with the patient and in the care of the patient on the floor/unit where the patient is located.     Jorge Rosado MD

## 2020-10-04 NOTE — PROGRESS NOTES
Ventilator check complete; patient has a #7.5 ET tube secured at the 24 at the lip. Patient is sedated. Patient is not able to follow commands. Breath sounds are coarse. Trachea is midline, Negative for subcutaneous air, and chest excursion is symmetric. Patient is also Negative for cyanosis and is Positive for pitting edema. All alarms are set and audible. Resuscitation bag is at the head of the bed. Ventilator Settings  Mode FIO2 Rate Tidal Volume Pressure PEEP I:E Ratio   Pressure control  45 %   18  16 cmH20  14 cm H20  1:2      Peak airway pressure: 31.1 cm H2O   Minute ventilation: 7.6 l/min     ABG: No results for input(s): PH, PCO2, PO2, HCO3 in the last 72 hours.       Boubacar Robles

## 2020-10-04 NOTE — PROGRESS NOTES
Pharmacokinetic Consult to Pharmacist    Anthony Jewels is a 56 y.o. female being treated for with vancomycin    Height: 5' 3\" (160 cm)  Weight: 84.2 kg (185 lb 10 oz)  Lab Results   Component Value Date/Time    BUN 31 (H) 10/04/2020 04:20 AM    Creatinine 1.63 (H) 10/04/2020 04:20 AM    WBC 11.1 10/04/2020 04:25 AM    Procalcitonin 6.14 10/02/2020 04:34 AM      Estimated Creatinine Clearance: 36.8 mL/min (A) (based on SCr of 1.63 mg/dL (H)). Lab Results   Component Value Date/Time    Vancomycin, random 10.7 10/04/2020 02:34 PM       Day 2 of vancomycin. Goal trough is 15-20 mcg/ml      Vancomycin level of 10.7 ~24 hours after the loading dose. Will initiate 1500 mg every 24 hours for now. Pharmacy will continue to follow patient and order levels when clinically indicated.     Thank you,  Orestes Sunshine, PharmD, [address]  Clinical Pharmacy Specialist  [phone]

## 2020-10-04 NOTE — PROGRESS NOTES
Ventilator check complete; patient has a #7.5 ET tube secured at the 23 at the teeth. Patient is sedated. Patient is not able to follow commands. Breath sounds are diminished. Trachea is midline, Negative for subcutaneous air, and chest excursion is symmetric. Patient is also Negative for cyanosis and is Negative for pitting edema. All alarms are set and audible. Resuscitation bag is at the head of the bed.       Ventilator Settings  Mode FIO2 Rate Tidal Volume Pressure PEEP I:E Ratio   Pressure control  40 % 16  14 cm H2O  14 cm H20  1:2      Peak airway pressure: 30 cm H2O   Minute ventilation: 7.6 l/min     Bryan Scale, RT

## 2020-10-05 ENCOUNTER — APPOINTMENT (OUTPATIENT)
Dept: GENERAL RADIOLOGY | Age: 60
DRG: 228 | End: 2020-10-05
Attending: THORACIC SURGERY (CARDIOTHORACIC VASCULAR SURGERY)
Payer: COMMERCIAL

## 2020-10-05 ENCOUNTER — APPOINTMENT (OUTPATIENT)
Dept: GENERAL RADIOLOGY | Age: 60
DRG: 228 | End: 2020-10-05
Attending: INTERNAL MEDICINE
Payer: COMMERCIAL

## 2020-10-05 PROBLEM — N17.9 AKI (ACUTE KIDNEY INJURY) (HCC): Status: ACTIVE | Noted: 2020-10-05

## 2020-10-05 LAB
ANION GAP SERPL CALC-SCNC: 4 MMOL/L (ref 7–16)
ARTERIAL PATENCY WRIST A: YES
BASE EXCESS BLD CALC-SCNC: 3 MMOL/L
BDY SITE: ABNORMAL
BUN SERPL-MCNC: 38 MG/DL (ref 8–23)
CALCIUM SERPL-MCNC: 7.8 MG/DL (ref 8.3–10.4)
CHLORIDE SERPL-SCNC: 105 MMOL/L (ref 98–107)
CO2 BLD-SCNC: 29 MMOL/L
CO2 SERPL-SCNC: 31 MMOL/L (ref 21–32)
COLLECT TIME,HTIME: 350
CREAT SERPL-MCNC: 1.96 MG/DL (ref 0.6–1)
ERYTHROCYTE [DISTWIDTH] IN BLOOD BY AUTOMATED COUNT: 15.6 % (ref 11.9–14.6)
EXHALED MINUTE VOLUME, VE: 7.6 L/MIN
GAS FLOW.O2 O2 DELIVERY SYS: ABNORMAL L/MIN
GAS FLOW.O2 SETTING OXYMISER: 18 BPM
GLUCOSE BLD STRIP.AUTO-MCNC: 162 MG/DL (ref 65–100)
GLUCOSE BLD STRIP.AUTO-MCNC: 165 MG/DL (ref 65–100)
GLUCOSE BLD STRIP.AUTO-MCNC: 187 MG/DL (ref 65–100)
GLUCOSE BLD STRIP.AUTO-MCNC: 211 MG/DL (ref 65–100)
GLUCOSE SERPL-MCNC: 155 MG/DL (ref 65–100)
HCO3 BLD-SCNC: 27.9 MMOL/L (ref 22–26)
HCT VFR BLD AUTO: 28.8 % (ref 35.8–46.3)
HGB BLD-MCNC: 9.3 G/DL (ref 11.7–15.4)
INSPIRATION.DURATION SETTING TIME VENT: 1.11 SEC
MAGNESIUM SERPL-MCNC: 2.8 MG/DL (ref 1.8–2.4)
MCH RBC QN AUTO: 29.1 PG (ref 26.1–32.9)
MCHC RBC AUTO-ENTMCNC: 32.3 G/DL (ref 31.4–35)
MCV RBC AUTO: 90 FL (ref 79.6–97.8)
NRBC # BLD: 0.03 K/UL (ref 0–0.2)
O2/TOTAL GAS SETTING VFR VENT: 40 %
PCO2 BLD: 43.9 MMHG (ref 35–45)
PEEP RESPIRATORY: 14 CMH2O
PH BLD: 7.41 [PH] (ref 7.35–7.45)
PHOSPHATE SERPL-MCNC: 2 MG/DL (ref 2.3–3.7)
PLATELET # BLD AUTO: 234 K/UL (ref 150–450)
PMV BLD AUTO: 11 FL (ref 9.4–12.3)
PO2 BLD: 109 MMHG (ref 75–100)
POTASSIUM SERPL-SCNC: 3.5 MMOL/L (ref 3.5–5.1)
PRESSURE CONTROL, IPC: 16
PROCALCITONIN SERPL-MCNC: 6.76 NG/ML
RBC # BLD AUTO: 3.2 M/UL (ref 4.05–5.2)
SAO2 % BLD: 98 % (ref 95–98)
SERVICE CMNT-IMP: ABNORMAL
SERVICE CMNT-IMP: ABNORMAL
SODIUM SERPL-SCNC: 140 MMOL/L (ref 136–145)
SPECIMEN TYPE: ABNORMAL
VENTILATION MODE VENT: ABNORMAL
WBC # BLD AUTO: 13 K/UL (ref 4.3–11.1)

## 2020-10-05 PROCEDURE — 82803 BLOOD GASES ANY COMBINATION: CPT

## 2020-10-05 PROCEDURE — 82962 GLUCOSE BLOOD TEST: CPT

## 2020-10-05 PROCEDURE — 80048 BASIC METABOLIC PNL TOTAL CA: CPT

## 2020-10-05 PROCEDURE — B548ZZA ULTRASONOGRAPHY OF SUPERIOR VENA CAVA, GUIDANCE: ICD-10-PCS | Performed by: INTERNAL MEDICINE

## 2020-10-05 PROCEDURE — 65610000006 HC RM INTENSIVE CARE

## 2020-10-05 PROCEDURE — 2709999900 HC NON-CHARGEABLE SUPPLY

## 2020-10-05 PROCEDURE — 85027 COMPLETE CBC AUTOMATED: CPT

## 2020-10-05 PROCEDURE — 74011250637 HC RX REV CODE- 250/637: Performed by: INTERNAL MEDICINE

## 2020-10-05 PROCEDURE — 36592 COLLECT BLOOD FROM PICC: CPT

## 2020-10-05 PROCEDURE — 84145 PROCALCITONIN (PCT): CPT

## 2020-10-05 PROCEDURE — 83735 ASSAY OF MAGNESIUM: CPT

## 2020-10-05 PROCEDURE — 84100 ASSAY OF PHOSPHORUS: CPT

## 2020-10-05 PROCEDURE — 74011250637 HC RX REV CODE- 250/637: Performed by: PHYSICIAN ASSISTANT

## 2020-10-05 PROCEDURE — 71045 X-RAY EXAM CHEST 1 VIEW: CPT

## 2020-10-05 PROCEDURE — 36573 INSJ PICC RS&I 5 YR+: CPT | Performed by: INTERNAL MEDICINE

## 2020-10-05 PROCEDURE — C9113 INJ PANTOPRAZOLE SODIUM, VIA: HCPCS | Performed by: INTERNAL MEDICINE

## 2020-10-05 PROCEDURE — 74011250636 HC RX REV CODE- 250/636

## 2020-10-05 PROCEDURE — 94003 VENT MGMT INPAT SUBQ DAY: CPT

## 2020-10-05 PROCEDURE — 74011636637 HC RX REV CODE- 636/637: Performed by: THORACIC SURGERY (CARDIOTHORACIC VASCULAR SURGERY)

## 2020-10-05 PROCEDURE — 74011250636 HC RX REV CODE- 250/636: Performed by: INTERNAL MEDICINE

## 2020-10-05 PROCEDURE — 02HV33Z INSERTION OF INFUSION DEVICE INTO SUPERIOR VENA CAVA, PERCUTANEOUS APPROACH: ICD-10-PCS | Performed by: INTERNAL MEDICINE

## 2020-10-05 PROCEDURE — 77030018798 HC PMP KT ENTRL FED COVD -A

## 2020-10-05 PROCEDURE — 99233 SBSQ HOSP IP/OBS HIGH 50: CPT | Performed by: INTERNAL MEDICINE

## 2020-10-05 PROCEDURE — 36600 WITHDRAWAL OF ARTERIAL BLOOD: CPT

## 2020-10-05 PROCEDURE — 74011250636 HC RX REV CODE- 250/636: Performed by: THORACIC SURGERY (CARDIOTHORACIC VASCULAR SURGERY)

## 2020-10-05 PROCEDURE — 74011000258 HC RX REV CODE- 258: Performed by: THORACIC SURGERY (CARDIOTHORACIC VASCULAR SURGERY)

## 2020-10-05 PROCEDURE — 74011000250 HC RX REV CODE- 250: Performed by: INTERNAL MEDICINE

## 2020-10-05 PROCEDURE — 74011000258 HC RX REV CODE- 258: Performed by: INTERNAL MEDICINE

## 2020-10-05 PROCEDURE — 74011250637 HC RX REV CODE- 250/637: Performed by: THORACIC SURGERY (CARDIOTHORACIC VASCULAR SURGERY)

## 2020-10-05 PROCEDURE — C1751 CATH, INF, PER/CENT/MIDLINE: HCPCS

## 2020-10-05 PROCEDURE — 74011000250 HC RX REV CODE- 250: Performed by: THORACIC SURGERY (CARDIOTHORACIC VASCULAR SURGERY)

## 2020-10-05 RX ORDER — SODIUM CHLORIDE 0.9 % (FLUSH) 0.9 %
30 SYRINGE (ML) INJECTION AS NEEDED
Status: DISCONTINUED | OUTPATIENT
Start: 2020-10-05 | End: 2020-10-12 | Stop reason: HOSPADM

## 2020-10-05 RX ORDER — SODIUM CHLORIDE 0.9 % (FLUSH) 0.9 %
30 SYRINGE (ML) INJECTION EVERY 8 HOURS
Status: DISCONTINUED | OUTPATIENT
Start: 2020-10-05 | End: 2020-10-12 | Stop reason: HOSPADM

## 2020-10-05 RX ORDER — MIDAZOLAM HYDROCHLORIDE 1 MG/ML
INJECTION, SOLUTION INTRAMUSCULAR; INTRAVENOUS
Status: COMPLETED
Start: 2020-10-05 | End: 2020-10-05

## 2020-10-05 RX ORDER — MIDAZOLAM HYDROCHLORIDE 1 MG/ML
2 INJECTION, SOLUTION INTRAMUSCULAR; INTRAVENOUS ONCE
Status: COMPLETED | OUTPATIENT
Start: 2020-10-05 | End: 2020-10-05

## 2020-10-05 RX ORDER — POTASSIUM CHLORIDE 20 MEQ/1
40 TABLET, EXTENDED RELEASE ORAL
Status: COMPLETED | OUTPATIENT
Start: 2020-10-05 | End: 2020-10-05

## 2020-10-05 RX ORDER — POTASSIUM CHLORIDE 14.9 MG/ML
20 INJECTION INTRAVENOUS ONCE
Status: COMPLETED | OUTPATIENT
Start: 2020-10-05 | End: 2020-10-05

## 2020-10-05 RX ADMIN — Medication 30 ML: at 14:32

## 2020-10-05 RX ADMIN — ACETAMINOPHEN 650 MG: 325 TABLET, FILM COATED ORAL at 22:24

## 2020-10-05 RX ADMIN — ROSUVASTATIN CALCIUM 40 MG: 20 TABLET, COATED ORAL at 21:56

## 2020-10-05 RX ADMIN — Medication 10 ML: at 22:11

## 2020-10-05 RX ADMIN — FUROSEMIDE 10 MG/HR: 10 INJECTION, SOLUTION INTRAMUSCULAR; INTRAVENOUS at 20:38

## 2020-10-05 RX ADMIN — Medication 30 ML: at 22:11

## 2020-10-05 RX ADMIN — DOCUSATE SODIUM 50 MG AND SENNOSIDES 8.6 MG 2 TABLET: 8.6; 5 TABLET, FILM COATED ORAL at 08:11

## 2020-10-05 RX ADMIN — PIPERACILLIN SODIUM AND TAZOBACTAM SODIUM 3.38 G: 3; .375 INJECTION, POWDER, LYOPHILIZED, FOR SOLUTION INTRAVENOUS at 08:12

## 2020-10-05 RX ADMIN — Medication 1 AMPULE: at 08:12

## 2020-10-05 RX ADMIN — INSULIN HUMAN 4 UNITS: 100 INJECTION, SOLUTION PARENTERAL at 22:16

## 2020-10-05 RX ADMIN — MIDAZOLAM HYDROCHLORIDE 2 MG: 1 INJECTION, SOLUTION INTRAMUSCULAR; INTRAVENOUS at 19:41

## 2020-10-05 RX ADMIN — DOCUSATE SODIUM 50 MG AND SENNOSIDES 8.6 MG 2 TABLET: 8.6; 5 TABLET, FILM COATED ORAL at 21:57

## 2020-10-05 RX ADMIN — ASPIRIN 81 MG: 81 TABLET, CHEWABLE ORAL at 08:11

## 2020-10-05 RX ADMIN — Medication 10 ML: at 22:12

## 2020-10-05 RX ADMIN — POTASSIUM CHLORIDE 40 MEQ: 20 TABLET, EXTENDED RELEASE ORAL at 11:57

## 2020-10-05 RX ADMIN — FUROSEMIDE 10 MG/HR: 10 INJECTION, SOLUTION INTRAMUSCULAR; INTRAVENOUS at 12:15

## 2020-10-05 RX ADMIN — Medication 10 ML: at 14:32

## 2020-10-05 RX ADMIN — Medication 1 AMPULE: at 21:57

## 2020-10-05 RX ADMIN — INSULIN HUMAN 2 UNITS: 100 INJECTION, SOLUTION PARENTERAL at 05:58

## 2020-10-05 RX ADMIN — INSULIN HUMAN 2 UNITS: 100 INJECTION, SOLUTION PARENTERAL at 17:20

## 2020-10-05 RX ADMIN — Medication 10 ML: at 06:00

## 2020-10-05 RX ADMIN — POTASSIUM CHLORIDE 20 MEQ: 14.9 INJECTION, SOLUTION INTRAVENOUS at 04:30

## 2020-10-05 RX ADMIN — PIPERACILLIN SODIUM AND TAZOBACTAM SODIUM 3.38 G: 3; .375 INJECTION, POWDER, LYOPHILIZED, FOR SOLUTION INTRAVENOUS at 22:10

## 2020-10-05 RX ADMIN — ACETAMINOPHEN 650 MG: 325 TABLET, FILM COATED ORAL at 08:11

## 2020-10-05 RX ADMIN — ACETAMINOPHEN 650 MG: 325 TABLET, FILM COATED ORAL at 03:37

## 2020-10-05 RX ADMIN — PIPERACILLIN SODIUM AND TAZOBACTAM SODIUM 3.38 G: 3; .375 INJECTION, POWDER, LYOPHILIZED, FOR SOLUTION INTRAVENOUS at 15:24

## 2020-10-05 RX ADMIN — SODIUM CHLORIDE 2.5 MG/HR: 900 INJECTION, SOLUTION INTRAVENOUS at 18:09

## 2020-10-05 RX ADMIN — SODIUM CHLORIDE 7.5 MG/HR: 900 INJECTION, SOLUTION INTRAVENOUS at 21:48

## 2020-10-05 RX ADMIN — PANTOPRAZOLE SODIUM 40 MG: 40 INJECTION, POWDER, FOR SOLUTION INTRAVENOUS at 08:12

## 2020-10-05 RX ADMIN — INSULIN HUMAN 2 UNITS: 100 INJECTION, SOLUTION PARENTERAL at 12:04

## 2020-10-05 NOTE — PROGRESS NOTES
Called and spoke with Dr Lizandro Guidry about patient becoming hypertensive. Sedation was restarted for patient; r/t agitation with respiratory rate in the 40s. MD said to restart cardene for HTN.   Monitoring patient for changes

## 2020-10-05 NOTE — PROGRESS NOTES
Comprehensive Nutrition Assessment    Type and Reason for Visit: Reassess(TF management (cardio surgery))    Nutrition Recommendations/Plan:   Continue TF and Prosource. Hold TF if MAP under 65. Nutrition Assessment:  Patient admitted for CABG. She has a PMH significant for HTNm CHF, HLD, afib, NSTEMI. S/p CABG 10/1. She was extubated after surgery, but had to be re-intubated 10/2. Patient remains intubated and sedated. New fevers over the weekend per RN. RN reports some high residuals over night. Reports ~190 ml residual this am. Report Levo was turned off over night, but had to be resume this am for low PB. Labs: Glucose 155, GFR 27  Medications: Zosyn, fentanyl, Lasix, SSI, Versed, Levo, Protonix, Pericolace  Abdomen: Active SA, passing gas per RN, still no BM    Estimated Daily Nutrient Needs:  Energy (kcal):  9293-2393(76-93 kcal/kg (85.4kg))  Protein (g):  >104(>2 g/kkg IBW (52.3kg))       Fluid (ml/day):  4919-2259(~1 ml/kcal)    Wounds:    Surgical wound       Current Nutrition Therapies:  DIET NPO  DIET TUBE FEEDING Open order for details. Give 2 packets Prosource TF at 9A with 50 ml water flush before and after. Keep HOB > 30 degrees. Check residuals every 4 hours. Hold TF for > 500 ml x 1 or 250 ml x 2 consecutive checks. Also place patien. .. Current Tube Feeding (TF) Orders:   · Feeding Route: Nasogastric  · Formula: Glucerna 1.5  · Schedule:Continuous    · Regimen: 45 ml/hr  · Additives/Modulars: Protein(PSTF x packets daily, 50 ml water flush before and after)  · Water Flushes: 30 ml/hr  · Goal TF & Flush Orders Provides: 1620 kcal (100% estimated calorie needs), 111 grams protein (100% estimated protein needs) and 1639ml free fluid (~1ml/kcal). Anthropometric Measures:  · Height:  5' 3\" (160 cm)  · Current Body Wt:  86.6 kg (190 lb 14.7 oz)(bed scale)   · Body mass index is 33.82 kg/m². · BMI Category:  Obese class 1 (BMI 30.0-34. 9)       Nutrition Diagnosis:   · Inadequate oral intake related to impaired respiratory function as evidenced by (intubated)    Nutrition Interventions:   Food and/or Nutrient Delivery: Continue NPO, Continue tube feeding  Coordination of Nutrition Care: (Discussed with Kinjal Parks RN)    Goals:  Continue to meet at least 75% nutrition needs with EN       Nutrition Monitoring and Evaluation:   Food/Nutrient Intake Outcomes: Enteral nutrition intake/tolerance  Physical Signs/Symptoms Outcomes: Biochemical data, GI status    Discharge Planning:     Too soon to determine     [address] [place], LD on 10/5/2020 at 10:28 AM  Contact: [phone]

## 2020-10-05 NOTE — PROGRESS NOTES
Critical Care Daily Progress Note: 10/5/2020  Admission Date: 9/29/2020     The patient's chart is reviewed and the patient is discussed with the staff. 55 y/o female Patient had CABG x 4 on 9/29. Currently is sedated in CV-ICU and orally intubated receiving  mechanical ventilation. Pt presented to the ER at [facility] on 9/18/2020 with chest pain and dyspnea. She was found to be in afib RVR. She was started on cardizem and admitted by cardiology. 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  She was extubated on day of surgery but reintubated 10/2  Subjective:   Remains on vent support  And 2 mics of levophed    Current Facility-Administered Medications   Medication Dose Route Frequency    potassium chloride 20 mEq in 100 ml IVPB  20 mEq IntraVENous ONCE    vancomycin (VANCOCIN) 1500 mg in  ml infusion  1,500 mg IntraVENous Q24H    piperacillin-tazobactam (ZOSYN) 3.375 g in 0.9% sodium chloride (MBP/ADV) 100 mL  3.375 g IntraVENous Q8H    LORazepam (ATIVAN) tablet 1 mg  1 mg Oral Q4H PRN    pantoprazole (PROTONIX) 40 mg in 0.9% sodium chloride 10 mL injection  40 mg IntraVENous Q24H    fentaNYL in normal saline (pf) 25 mcg/mL infusion  0-200 mcg/hr IntraVENous TITRATE    NOREPINephrine (LEVOPHED) 4 mg in 5% dextrose 250 mL infusion  0.5-16 mcg/min IntraVENous TITRATE    midazolam in normal saline (VERSED) 1 mg/mL infusion  0-10 mg/hr IntraVENous TITRATE    sodium chloride (NS) flush 5-40 mL  5-40 mL IntraVENous Q8H    sodium chloride (NS) flush 5-40 mL  5-40 mL IntraVENous PRN    midazolam (VERSED) injection 2 mg  2 mg IntraVENous Multiple    NUTRITIONAL SUPPORT ELECTROLYTE PRN ORDERS   Does Not Apply PRN    senna-docusate (PERICOLACE) 8.6-50 mg per tablet 2 Tab  2 Tab Oral BID    LORazepam (ATIVAN) injection 1 mg  1 mg IntraVENous Q4H PRN    traMADoL (ULTRAM) tablet 100 mg  100 mg Oral Q6H PRN    oxyCODONE-acetaminophen (PERCOCET) 5-325 mg per tablet 1 Tab  1 Tab Oral Q4H PRN    insulin regular (NOVOLIN R, HUMULIN R) injection   SubCUTAneous AC&HS    alcohol 62% (NOZIN) nasal  1 Ampule  1 Ampule Topical Q12H    rosuvastatin (CRESTOR) tablet 40 mg  40 mg Oral QHS    dextrose 5% - 0.45% NaCl with KCl 20 mEq/L infusion  25 mL/hr IntraVENous CONTINUOUS    sodium chloride (NS) flush 5-40 mL  5-40 mL IntraVENous Q8H    sodium chloride (NS) flush 5-40 mL  5-40 mL IntraVENous PRN    naloxone (NARCAN) injection 0.4 mg  0.4 mg IntraVENous PRN    sodium bicarbonate (8.4%) injection 50 mEq  50 mEq IntraVENous PRN    nitroglycerin (Tridil) 200 mcg/ml infusion   mcg/min IntraVENous TITRATE    amiodarone (CORDARONE) tablet 200 mg  200 mg Oral Q12H    metoprolol tartrate (LOPRESSOR) tablet 25 mg  25 mg Oral Q12H    ondansetron (ZOFRAN) injection 4 mg  4 mg IntraVENous Q4H PRN    dextrose (D50W) injection syrg 12.5 g  25 mL IntraVENous PRN    aspirin chewable tablet 81 mg  81 mg Oral DAILY    magnesium sulfate 1 g/100 ml IVPB (premix or compounded)  1 g IntraVENous PRN    EPINEPHrine (ADRENALIN) 4 mg in 0.9% sodium chloride 250 mL infusion (PRE-MIX)  0.01-0.05 mcg/kg/min IntraVENous TITRATE    acetaminophen (TYLENOL) tablet 650 mg  650 mg Oral Q4H PRN    niCARdipine in Saline (CARDENE) 25 MG/250 mL infusion kit  5-15 mg/hr IntraVENous TITRATE       Review of Systems   Unobtainable due to patient status. Objective:     Vitals:    10/05/20 0530 10/05/20 0545 10/05/20 0600 10/05/20 0615   BP: (!) 108/55 (!) 102/52 (!) 108/55 (!) 110/58   Pulse: 68 68 68 69   Resp: 18 18 19 18   Temp:       SpO2: 95% 95% 95% 96%   Weight:       Height:             Intake/Output Summary (Last 24 hours) at 10/5/2020 0625  Last data filed at 10/5/2020 0300  Gross per 24 hour   Intake 3016.32 ml   Output 810 ml   Net 2206.32 ml         Physical Exam:          Constitutional:  intubated and mechanically ventilated.   EENMT:  Sclera clear, pupils equal, oral mucosa moist  Respiratory:  crackles  Cardiovascular:  RRR with no M,G,R;  Gastrointestinal:  soft with no tenderness; positive bowel sounds present  Musculoskeletal:  warm with no cyanosis, no lower extremity edema  Skin:  no jaundice or ecchymosis  Neurologic: no gross neuro deficits     Psychiatric:  Wakes up some    LINES:    ETT, central    DRIPS:    Levophed, versed, fentanyl    CXR:   Imp[roved infiltrates today          Ventilator Settings  Mode FIO2 Rate Tidal Volume Pressure PEEP   Pressure control  40 %    450 ml  14 cm H2O  14 cm H20      Peak airway pressure: 30.2 cm H2O   Minute ventilation: 8.3 l/min     ABG:   Recent Labs     10/05/20  0351 10/04/20  0155 10/03/20  0403   PHI 7.41 7.43 7.42   PCO2I 43.9 45.6* 38.8   PO2I 109* 232* 81   HCO3I 27.9* 29.9* 24.9       LAB  Recent Labs     10/05/20  0555 10/04/20  2115 10/04/20  1538 10/04/20  1137 10/04/20  0636   GLUCPOC 187* 175* 174* 178* 172*     Recent Labs     10/05/20  0309 10/04/20  0425 10/03/20  0417   WBC 13.0* 11.1 15.2*   HGB 9.3* 9.4* 10.2*   HCT 28.8* 29.4* 31.8*    194 188     Recent Labs     10/05/20  0309 10/04/20  0420 10/03/20  0417    141 140   K 3.5 3.2* 3.3*    104 105   CO2 31 32 29   * 158* 142*   BUN 38* 31* 36*   CREA 1.96* 1.63* 1.68*   MG 2.8* 2.4 2.3   PHOS 2.0*  --   --    CA 7.8* 7.7* 7.8*     No results for input(s): LCAD, LAC in the last 72 hours.       Patient Active Problem List   Diagnosis Code    Paroxysmal atrial fibrillation (HCC) I48.0    Essential hypertension I10    Mixed hyperlipidemia E78.2    Uterine cancer ([facility] [address] C55    Chest pain R07.9    Atrial fibrillation (HCC) I48.91    Paroxysmal A-fib (HCC) I48.0    Atherosclerosis of native coronary artery of native heart with unstable angina pectoris (HCC) I25.110    S/P CABG x 4 Z95.1    Suspected sleep apnea R29.818    CAD (coronary artery disease) I25.10    AMOR on CPAP G47.33, Z99.89    Pulmonary edema J81.1    Anxiety F41.9    Acute respiratory failure with hypoxia (HCC) J96.01    Bilateral pulmonary infiltrates on chest x-ray R91.8    Hypotension I95.9    YURI (acute kidney injury) ([facility] N17.9         Assessment:  (Medical Decision Making)     Hospital Problems  Date Reviewed: 9/22/2020          Codes Class Noted POA    YURI (acute kidney injury) ([facility] ICD-10-CM: N17.9  ICD-9-CM: 584.9  10/5/2020 Unknown    Cr 1.96    Hypotension ICD-10-CM: I95.9  ICD-9-CM: 458.9  10/3/2020 Unknown        Acute respiratory failure with hypoxia ([facility] ICD-10-CM: J96.01  ICD-9-CM: 518.81  10/2/2020 Unknown        Bilateral pulmonary infiltrates on chest x-ray ICD-10-CM: R91.8  ICD-9-CM: 793.19  10/2/2020 Unknown    Improved-increased procal but cultures negative    Pulmonary edema ICD-10-CM: J81.1  ICD-9-CM: 521  10/1/2020 Unknown        Anxiety ICD-10-CM: F41.9  ICD-9-CM: 300.00  10/1/2020 Unknown        AMOR on CPAP ICD-10-CM: G47.33, Z99.89  ICD-9-CM: 327.23, V46.8  9/30/2020 Unknown        Paroxysmal A-fib (HCC) ICD-10-CM: I48.0  ICD-9-CM: 427.31  9/29/2020 Unknown        Atherosclerosis of native coronary artery of native heart with unstable angina pectoris (HCC) ICD-10-CM: I25.110  ICD-9-CM: 414.01, 411.1  9/29/2020 Unknown        * (Principal) S/P CABG x 4 ICD-10-CM: Z95.1  ICD-9-CM: V45.81  9/29/2020 Unknown        CAD (coronary artery disease) ICD-10-CM: I25.10  ICD-9-CM: 414.00  9/29/2020 Unknown        Paroxysmal atrial fibrillation ([facility] ICD-10-CM: I48.0  ICD-9-CM: 427.31  9/18/2020 Unknown              Plan:  (Medical Decision Making)   1    Needs renal consult   2    Vent support- can try on pressure support  3    Sedation vacation  4    Continue antibx for now- no mrsa -can probably stop vanc  --    More than 50% of the time documented was spent in face-to-face contact with the patient and in the care of the patient on the floor/unit where the patient is located.     Alfredo Shirley MD

## 2020-10-05 NOTE — PROGRESS NOTES
CV Progress Note    Subjective: Incisional pain:  moderate  Appetite:  on tube feedings  Activity:   sedated on vent      Objective:     Vitals:  Blood pressure (!) 109/55, pulse 62, temperature 100.1 °F (37.8 °C), resp. rate 18, height 5' 3\" (1.6 m), weight 190 lb 14.7 oz (86.6 kg), SpO2 98 %. Temp (24hrs), Av.3 °F (37.4 °C), Min:97.1 °F (36.2 °C), Max:101.3 °F (38.5 °C)        Plan/Recommendations/Medical Decision Making:     Principal Problem:    S/P CABG x 4 (2020)    Active Problems:    Paroxysmal atrial fibrillation ([address] (2020)      Paroxysmal A-fib ([address] (2020)      Atherosclerosis of native coronary artery of native heart with unstable angina pectoris ([address] (2020)      CAD (coronary artery disease) (2020)      AMOR on CPAP (2020)      Pulmonary edema (10/1/2020)      Anxiety (10/1/2020)      Acute respiratory failure with hypoxia ([address] (10/2/2020)      Bilateral pulmonary infiltrates on chest x-ray (10/2/2020)      Hypotension (10/3/2020)      YURI (acute kidney injury) ([address] (10/5/2020)        Continue present treatment  CXR looks better. Will give sedation vacation. Plan trach this week if unable to extubate  See orders for details. Milton Eugene.  Giovanna Vargas MD

## 2020-10-05 NOTE — PROGRESS NOTES
CM continues to follow for discharge planning and/or CM needs. Pt remains on vent at this time. No CM needs noted in chart or voiced at this time. Will continue to monitor and update as needed.

## 2020-10-05 NOTE — CONSULTS
Nephrology consult    Admission Date:  9/29/2020    Admission Diagnosis  Atherosclerosis of native coronary artery with unstable angina pectoris, unspecified whether native or transplanted heart (HCC) [I25.110]  Paroxysmal A-fib ([facility]) [I48.0]  Paroxysmal A-fib ([facility] [I48.0]  Atherosclerosis of native coronary artery of native heart with unstable angina pectoris ([facility] [I25.110]  CAD (coronary artery disease) [I25.10]  Paroxysmal atrial fibrillation ([facility] [I48.0]    We are asked by Dr. Dusty Mayfield    History of Present Illness: this is a 55 y/o female with MVCAD. She had a CABG x4V. Post op she was extubated but had to be re-intubated 10/2/20 for hypoxia. She has developed YURI and decreased urine out put and worsening respiratory failure. I have been consulted for YURI on CKD . Pt s baseline serum creatinine has been 1.3-1.8. Her creatinine is now up to 1.96.     Past Medical History:   Diagnosis Date    Anxiety     Borderline diabetes     [facility] ([facility]     Essential hypertension 9/18/2020    Heart failure ([facility]     EF 55-60% ECHO 9/18/20    History of anemia     Mixed hyperlipidemia 9/18/2020    Paroxysmal atrial fibrillation ([facility] 9/18/2020 9/18/20- SB at [facility]  appt 9/28/20    Sleep apnea     uses CPAP QHS      Past Surgical History:   Procedure Laterality Date    HX CYST REMOVAL      from scalp as kid    HX CYST REMOVAL      HX HEART CATHETERIZATION  09/18/2020    HX HYSTERECTOMY      HX ORTHOPAEDIC      left elbow surgery      Current Facility-Administered Medications   Medication Dose Route Frequency    furosemide (LASIX) 100 mg in 0.9% sodium chloride (MBP/ADV) 100 mL infusion  10 mg/hr IntraVENous CONTINUOUS    sodium chloride (NS) flush 30 mL  30 mL InterCATHeter Q8H    sodium chloride (NS) flush 30 mL  30 mL InterCATHeter PRN    piperacillin-tazobactam (ZOSYN) 3.375 g in 0.9% sodium chloride (MBP/ADV) 100 mL  3.375 g IntraVENous Q8H    LORazepam (ATIVAN) tablet 1 mg  1 mg Oral Q4H PRN  pantoprazole (PROTONIX) 40 mg in 0.9% sodium chloride 10 mL injection  40 mg IntraVENous Q24H    fentaNYL in normal saline (pf) 25 mcg/mL infusion  0-200 mcg/hr IntraVENous TITRATE    NOREPINephrine (LEVOPHED) 4 mg in 5% dextrose 250 mL infusion  0.5-16 mcg/min IntraVENous TITRATE    midazolam in normal saline (VERSED) 1 mg/mL infusion  0-10 mg/hr IntraVENous TITRATE    sodium chloride (NS) flush 5-40 mL  5-40 mL IntraVENous Q8H    sodium chloride (NS) flush 5-40 mL  5-40 mL IntraVENous PRN    midazolam (VERSED) injection 2 mg  2 mg IntraVENous Multiple    NUTRITIONAL SUPPORT ELECTROLYTE PRN ORDERS   Does Not Apply PRN    senna-docusate (PERICOLACE) 8.6-50 mg per tablet 2 Tab  2 Tab Oral BID    LORazepam (ATIVAN) injection 1 mg  1 mg IntraVENous Q4H PRN    traMADoL (ULTRAM) tablet 100 mg  100 mg Oral Q6H PRN    oxyCODONE-acetaminophen (PERCOCET) 5-325 mg per tablet 1 Tab  1 Tab Oral Q4H PRN    insulin regular (NOVOLIN R, HUMULIN R) injection   SubCUTAneous AC&HS    alcohol 62% (NOZIN) nasal  1 Ampule  1 Ampule Topical Q12H    rosuvastatin (CRESTOR) tablet 40 mg  40 mg Oral QHS    dextrose 5% - 0.45% NaCl with KCl 20 mEq/L infusion  25 mL/hr IntraVENous CONTINUOUS    sodium chloride (NS) flush 5-40 mL  5-40 mL IntraVENous Q8H    sodium chloride (NS) flush 5-40 mL  5-40 mL IntraVENous PRN    naloxone (NARCAN) injection 0.4 mg  0.4 mg IntraVENous PRN    sodium bicarbonate (8.4%) injection 50 mEq  50 mEq IntraVENous PRN    nitroglycerin (Tridil) 200 mcg/ml infusion   mcg/min IntraVENous TITRATE    amiodarone (CORDARONE) tablet 200 mg  200 mg Oral Q12H    metoprolol tartrate (LOPRESSOR) tablet 25 mg  25 mg Oral Q12H    ondansetron (ZOFRAN) injection 4 mg  4 mg IntraVENous Q4H PRN    dextrose (D50W) injection syrg 12.5 g  25 mL IntraVENous PRN    aspirin chewable tablet 81 mg  81 mg Oral DAILY    magnesium sulfate 1 g/100 ml IVPB (premix or compounded)  1 g IntraVENous PRN    EPINEPHrine (ADRENALIN) 4 mg in 0.9% sodium chloride 250 mL infusion (PRE-MIX)  0.01-0.05 mcg/kg/min IntraVENous TITRATE    acetaminophen (TYLENOL) tablet 650 mg  650 mg Oral Q4H PRN    niCARdipine in Saline (CARDENE) 25 MG/250 mL infusion kit  5-15 mg/hr IntraVENous TITRATE     Allergies   Allergen Reactions    Influenza Virus Vaccines Other (comments)     Gives her the flu. Social History     Tobacco Use    Smoking status: Current Every Day Smoker     Packs/day: 0.50    Smokeless tobacco: Never Used   Substance Use Topics    Alcohol use: Not Currently      Family History   Problem Relation Age of Onset    Coronary Artery Disease Mother     Cancer Mother     Hypertension Mother     Coronary Artery Disease Father     Hypertension Father     Coronary Artery Disease Brother     Hypertension Brother     Lung Disease Brother         Review of Systems  UTO pt intubated and sedated    Objective:     Vitals:    10/05/20 1203 10/05/20 1213 10/05/20 1229 10/05/20 1244   BP:  133/61 (!) 143/66 (!) 129/56   Pulse: 63 64 63 63   Resp: 18 22 18 18   Temp:       SpO2: 99%  100% 100%   Weight:       Height:           Intake/Output Summary (Last 24 hours) at 10/5/2020 1427  Last data filed at 10/5/2020 1100  Gross per 24 hour   Intake 2105.55 ml   Output 1025 ml   Net 1080.55 ml       Physical Exam  GEN :in no distressintubated  HEENT: anicteric sclerae, eomi. Oropharynx without lesions. Mucous membranes are moist.  Neck - supple without JVD, no thyromegaly. No lymphadenopathy.   CV - regular rate and rhythm, no murmur, no rub  Lung - clear bilaterally, lungs expand symmetrically  Chest wall - normal appearance  Abd - soft, nontender, bowel sounds present, no hepatosplenomegaly  Ext - no clubbing, no cyanosis, no edema  Neurologic - N/A  Genitourinary - bladder nonpalpable  Skin - no rashes, no purpura, no ecchymoses  Psychiatric: N/A      Data Review:   Recent Labs     10/05/20  0309 10/04/20  9865 10/03/20  0417   WBC 13.0* 11.1 15.2*   HGB 9.3* 9.4* 10.2*   HCT 28.8* 29.4* 31.8*    194 188     Recent Labs     10/05/20  0309 10/04/20  0420 10/03/20  0417    141 140   K 3.5 3.2* 3.3*    104 105   CO2 31 32 29   BUN 38* 31* 36*   CREA 1.96* 1.63* 1.68*   * 158* 142*   CA 7.8* 7.7* 7.8*   MG 2.8* 2.4 2.3   PHOS 2.0*  --   --      No results for input(s): PH, PCO2, PO2, PCO2 in the last 72 hours. Problem List:     Patient Active Problem List    Diagnosis Date Noted    [facility] 10/05/2020    Hypotension 10/03/2020    Acute respiratory failure with hypoxia ([facility] 10/02/2020    Bilateral pulmonary infiltrates on chest x-ray 10/02/2020    Pulmonary edema 10/01/2020    Anxiety 10/01/2020    AMOR on CPAP 09/30/2020    Paroxysmal A-fib ([address] 09/29/2020    Atherosclerosis of native coronary artery of native heart with unstable angina pectoris ([facility] 09/29/2020    S/P CABG x 4 09/29/2020    Suspected sleep apnea 09/29/2020    CAD (coronary artery disease) 09/29/2020    Paroxysmal atrial fibrillation ([address] 09/18/2020    Essential hypertension 09/18/2020    Mixed hyperlipidemia 09/18/2020    Uterine cancer ([facility] 09/18/2020    Chest pain 09/18/2020    Atrial fibrillation ([address] 09/18/2020       Impression:    Plan:   1. Respiratory Failure  2. Pulmonary edema  3. YURI on CKD start lasix drip will consider dialysis in the next 24-48 hour if no adequate urine response. Thanks for the consult.

## 2020-10-05 NOTE — PROGRESS NOTES
Ventilator check complete; patient has a #7.5 ET tube secured at the 23 at the teeth. Patient is sedated. Patient is able to nod her head for questions. Breath sounds are diminished. Trachea is midline, Negative for subcutaneous air, and chest excursion is symmetric. Patient is also Negative for cyanosis and is Negative for pitting edema. All alarms are set and audible. Resuscitation bag is at the head of the bed. Ventilator Settings  Mode FIO2 Rate Tidal Volume Pressure PEEP I:E Ratio   Pressure control  40 %    450 ml  14 cm H2O  14 cm H20  1:2      Peak airway pressure: 30.2 cm H2O   Minute ventilation: 8.3 l/min     ABG: No results for input(s): PH, PCO2, PO2, HCO3 in the last 72 hours.       Juliet Marie

## 2020-10-05 NOTE — PROGRESS NOTES
PICC Placement Note    PRE-PROCEDURE VERIFICATION  Correct Procedure: yes. Time out completed with assistant Paramjit Victor RN and all persons present in agreement with time out. Correct Site:  yes  Temperature: Temp: 97.9 °F (36.6 °C), Temperature Source: Temp Source: Axillary  Recent Labs     10/05/20  0309   BUN 38*   CREA 1.96*      WBC 13.0*     Allergies: Influenza virus vaccines  Education materials for [facility] Care given to patient or family. PROCEDURE DETAIL  A triple lumen PICC line was started for vascular access and desire for reliable access. The following documentation is in addition to the PICC properties in the lines/airways flowsheet :  Lot #: [id number]  xylocaine used: yes  Mid-Arm Circumference: 36 (cm)  Internal Catheter Length: 36 (cm)  Internal Catheter Total Length: 36 (cm)  Vein Selection for PICC:right basilic  Central Line Bundle followed yes  Complication Related to Insertion: none  Both the insertion guidewire and ECG guidewire were removed intact all ports have positive blood return and were flush well with normal saline. The location of the tip of the PICC is verified using ECG technology. The tip is in the SVC per ECG reading. See image below.          Line is okay to use: yes

## 2020-10-05 NOTE — PROGRESS NOTES
A follow up visit was made to the patient. Emotional support, spiritual presence and   prayer were provided for the patient. The medical team was doing a procedure for her.       LIV Andrew

## 2020-10-05 NOTE — PROGRESS NOTES
Spoke with the PICC team; updated them that Dr. Mckayla Marion said it was ok for patient to get PICC line with current Cr. Level.  They will come shortly

## 2020-10-06 ENCOUNTER — ANESTHESIA EVENT (OUTPATIENT)
Dept: SURGERY | Age: 60
End: 2020-10-06

## 2020-10-06 ENCOUNTER — APPOINTMENT (OUTPATIENT)
Dept: GENERAL RADIOLOGY | Age: 60
DRG: 228 | End: 2020-10-06
Attending: THORACIC SURGERY (CARDIOTHORACIC VASCULAR SURGERY)
Payer: COMMERCIAL

## 2020-10-06 LAB
ANION GAP SERPL CALC-SCNC: 6 MMOL/L (ref 7–16)
ARTERIAL PATENCY WRIST A: YES
BASE EXCESS BLD CALC-SCNC: 6 MMOL/L
BDY SITE: ABNORMAL
BUN SERPL-MCNC: 38 MG/DL (ref 8–23)
CALCIUM SERPL-MCNC: 7.2 MG/DL (ref 8.3–10.4)
CHLORIDE SERPL-SCNC: 107 MMOL/L (ref 98–107)
CO2 BLD-SCNC: 31 MMOL/L
CO2 SERPL-SCNC: 30 MMOL/L (ref 21–32)
COLLECT TIME,HTIME: 300
CREAT SERPL-MCNC: 2.05 MG/DL (ref 0.6–1)
ERYTHROCYTE [DISTWIDTH] IN BLOOD BY AUTOMATED COUNT: 15.7 % (ref 11.9–14.6)
EXHALED MINUTE VOLUME, VE: 7.4 L/MIN
GAS FLOW.O2 O2 DELIVERY SYS: ABNORMAL L/MIN
GLUCOSE BLD STRIP.AUTO-MCNC: 228 MG/DL (ref 65–100)
GLUCOSE BLD STRIP.AUTO-MCNC: 241 MG/DL (ref 65–100)
GLUCOSE BLD STRIP.AUTO-MCNC: 282 MG/DL (ref 65–100)
GLUCOSE SERPL-MCNC: 219 MG/DL (ref 65–100)
HCO3 BLD-SCNC: 29.6 MMOL/L (ref 22–26)
HCT VFR BLD AUTO: 26.8 % (ref 35.8–46.3)
HGB BLD-MCNC: 8.8 G/DL (ref 11.7–15.4)
MAGNESIUM SERPL-MCNC: 2.1 MG/DL (ref 1.8–2.4)
MCH RBC QN AUTO: 29.2 PG (ref 26.1–32.9)
MCHC RBC AUTO-ENTMCNC: 32.8 G/DL (ref 31.4–35)
MCV RBC AUTO: 89 FL (ref 79.6–97.8)
NRBC # BLD: 0 K/UL (ref 0–0.2)
O2/TOTAL GAS SETTING VFR VENT: 40 %
PCO2 BLD: 39.7 MMHG (ref 35–45)
PEEP RESPIRATORY: 8 CMH2O
PH BLD: 7.48 [PH] (ref 7.35–7.45)
PLATELET # BLD AUTO: 248 K/UL (ref 150–450)
PMV BLD AUTO: 10.3 FL (ref 9.4–12.3)
PO2 BLD: 67 MMHG (ref 75–100)
POTASSIUM SERPL-SCNC: 3.3 MMOL/L (ref 3.5–5.1)
PRESSURE SUPPORT SETTING VENT: 8 CMH2O
RBC # BLD AUTO: 3.01 M/UL (ref 4.05–5.2)
SAO2 % BLD: 94 % (ref 95–98)
SERVICE CMNT-IMP: ABNORMAL
SODIUM SERPL-SCNC: 143 MMOL/L (ref 136–145)
SPECIMEN TYPE: ABNORMAL
TOTAL RESP. RATE, ITRR: 19
VANCOMYCIN SERPL-MCNC: 11.5 UG/ML
VENTILATION MODE VENT: ABNORMAL
WBC # BLD AUTO: 17 K/UL (ref 4.3–11.1)

## 2020-10-06 PROCEDURE — 94003 VENT MGMT INPAT SUBQ DAY: CPT

## 2020-10-06 PROCEDURE — 80202 ASSAY OF VANCOMYCIN: CPT

## 2020-10-06 PROCEDURE — 74011636637 HC RX REV CODE- 636/637: Performed by: PHYSICIAN ASSISTANT

## 2020-10-06 PROCEDURE — 74011000258 HC RX REV CODE- 258: Performed by: INTERNAL MEDICINE

## 2020-10-06 PROCEDURE — 74011000258 HC RX REV CODE- 258: Performed by: THORACIC SURGERY (CARDIOTHORACIC VASCULAR SURGERY)

## 2020-10-06 PROCEDURE — 74011000250 HC RX REV CODE- 250: Performed by: THORACIC SURGERY (CARDIOTHORACIC VASCULAR SURGERY)

## 2020-10-06 PROCEDURE — 74011636637 HC RX REV CODE- 636/637: Performed by: THORACIC SURGERY (CARDIOTHORACIC VASCULAR SURGERY)

## 2020-10-06 PROCEDURE — 82803 BLOOD GASES ANY COMBINATION: CPT

## 2020-10-06 PROCEDURE — 85027 COMPLETE CBC AUTOMATED: CPT

## 2020-10-06 PROCEDURE — 36600 WITHDRAWAL OF ARTERIAL BLOOD: CPT

## 2020-10-06 PROCEDURE — 74011250637 HC RX REV CODE- 250/637: Performed by: THORACIC SURGERY (CARDIOTHORACIC VASCULAR SURGERY)

## 2020-10-06 PROCEDURE — 74011000250 HC RX REV CODE- 250: Performed by: INTERNAL MEDICINE

## 2020-10-06 PROCEDURE — 99291 CRITICAL CARE FIRST HOUR: CPT | Performed by: INTERNAL MEDICINE

## 2020-10-06 PROCEDURE — 87070 CULTURE OTHR SPECIMN AEROBIC: CPT

## 2020-10-06 PROCEDURE — 80048 BASIC METABOLIC PNL TOTAL CA: CPT

## 2020-10-06 PROCEDURE — C9113 INJ PANTOPRAZOLE SODIUM, VIA: HCPCS | Performed by: INTERNAL MEDICINE

## 2020-10-06 PROCEDURE — 65610000006 HC RM INTENSIVE CARE

## 2020-10-06 PROCEDURE — 83735 ASSAY OF MAGNESIUM: CPT

## 2020-10-06 PROCEDURE — 74011250636 HC RX REV CODE- 250/636: Performed by: INTERNAL MEDICINE

## 2020-10-06 PROCEDURE — 82962 GLUCOSE BLOOD TEST: CPT

## 2020-10-06 PROCEDURE — 2709999900 HC NON-CHARGEABLE SUPPLY

## 2020-10-06 PROCEDURE — 36592 COLLECT BLOOD FROM PICC: CPT

## 2020-10-06 PROCEDURE — 71045 X-RAY EXAM CHEST 1 VIEW: CPT

## 2020-10-06 PROCEDURE — 74011250636 HC RX REV CODE- 250/636: Performed by: THORACIC SURGERY (CARDIOTHORACIC VASCULAR SURGERY)

## 2020-10-06 PROCEDURE — 74011250637 HC RX REV CODE- 250/637: Performed by: PHYSICIAN ASSISTANT

## 2020-10-06 RX ORDER — INSULIN GLARGINE 100 [IU]/ML
15 INJECTION, SOLUTION SUBCUTANEOUS
Status: DISCONTINUED | OUTPATIENT
Start: 2020-10-06 | End: 2020-10-07

## 2020-10-06 RX ORDER — POTASSIUM CHLORIDE 29.8 MG/ML
40 INJECTION INTRAVENOUS ONCE
Status: COMPLETED | OUTPATIENT
Start: 2020-10-06 | End: 2020-10-06

## 2020-10-06 RX ADMIN — DOCUSATE SODIUM 50 MG AND SENNOSIDES 8.6 MG 2 TABLET: 8.6; 5 TABLET, FILM COATED ORAL at 08:00

## 2020-10-06 RX ADMIN — LORAZEPAM 1 MG: 2 INJECTION INTRAMUSCULAR; INTRAVENOUS at 21:54

## 2020-10-06 RX ADMIN — POTASSIUM CHLORIDE 40 MEQ: 400 INJECTION, SOLUTION INTRAVENOUS at 04:45

## 2020-10-06 RX ADMIN — Medication 1 AMPULE: at 21:00

## 2020-10-06 RX ADMIN — ACETAMINOPHEN 650 MG: 325 TABLET, FILM COATED ORAL at 17:07

## 2020-10-06 RX ADMIN — METOPROLOL TARTRATE 25 MG: 25 TABLET, FILM COATED ORAL at 08:24

## 2020-10-06 RX ADMIN — ASPIRIN 81 MG: 81 TABLET, CHEWABLE ORAL at 08:24

## 2020-10-06 RX ADMIN — Medication 1 AMPULE: at 08:24

## 2020-10-06 RX ADMIN — PIPERACILLIN SODIUM AND TAZOBACTAM SODIUM 3.38 G: 3; .375 INJECTION, POWDER, LYOPHILIZED, FOR SOLUTION INTRAVENOUS at 23:45

## 2020-10-06 RX ADMIN — ACETAMINOPHEN 650 MG: 325 TABLET, FILM COATED ORAL at 12:25

## 2020-10-06 RX ADMIN — Medication 10 ML: at 06:13

## 2020-10-06 RX ADMIN — ACETAMINOPHEN 650 MG: 325 TABLET, FILM COATED ORAL at 04:05

## 2020-10-06 RX ADMIN — PANTOPRAZOLE SODIUM 40 MG: 40 INJECTION, POWDER, FOR SOLUTION INTRAVENOUS at 08:00

## 2020-10-06 RX ADMIN — DEXMEDETOMIDINE 0.5 MCG/KG/HR: 100 INJECTION, SOLUTION, CONCENTRATE INTRAVENOUS at 19:55

## 2020-10-06 RX ADMIN — SODIUM CHLORIDE 5 MG/HR: 900 INJECTION, SOLUTION INTRAVENOUS at 01:47

## 2020-10-06 RX ADMIN — VANCOMYCIN HYDROCHLORIDE 1000 MG: 1 INJECTION, POWDER, LYOPHILIZED, FOR SOLUTION INTRAVENOUS at 10:29

## 2020-10-06 RX ADMIN — INSULIN HUMAN 4 UNITS: 100 INJECTION, SOLUTION PARENTERAL at 21:18

## 2020-10-06 RX ADMIN — PIPERACILLIN SODIUM AND TAZOBACTAM SODIUM 3.38 G: 3; .375 INJECTION, POWDER, LYOPHILIZED, FOR SOLUTION INTRAVENOUS at 07:34

## 2020-10-06 RX ADMIN — INSULIN HUMAN 4 UNITS: 100 INJECTION, SOLUTION PARENTERAL at 12:25

## 2020-10-06 RX ADMIN — Medication 10 ML: at 21:02

## 2020-10-06 RX ADMIN — SODIUM CHLORIDE 5 MG/HR: 900 INJECTION, SOLUTION INTRAVENOUS at 18:21

## 2020-10-06 RX ADMIN — INSULIN HUMAN 6 UNITS: 100 INJECTION, SOLUTION PARENTERAL at 06:11

## 2020-10-06 RX ADMIN — INSULIN HUMAN 4 UNITS: 100 INJECTION, SOLUTION PARENTERAL at 17:20

## 2020-10-06 RX ADMIN — FUROSEMIDE 10 MG/HR: 10 INJECTION, SOLUTION INTRAMUSCULAR; INTRAVENOUS at 08:48

## 2020-10-06 RX ADMIN — PIPERACILLIN SODIUM AND TAZOBACTAM SODIUM 3.38 G: 3; .375 INJECTION, POWDER, LYOPHILIZED, FOR SOLUTION INTRAVENOUS at 15:58

## 2020-10-06 RX ADMIN — SODIUM CHLORIDE 5 MG/HR: 900 INJECTION, SOLUTION INTRAVENOUS at 07:33

## 2020-10-06 RX ADMIN — Medication 30 ML: at 15:58

## 2020-10-06 RX ADMIN — FUROSEMIDE 10 MG/HR: 10 INJECTION, SOLUTION INTRAMUSCULAR; INTRAVENOUS at 18:38

## 2020-10-06 RX ADMIN — Medication 30 ML: at 21:01

## 2020-10-06 RX ADMIN — Medication 30 ML: at 06:13

## 2020-10-06 RX ADMIN — ROSUVASTATIN CALCIUM 40 MG: 20 TABLET, COATED ORAL at 21:00

## 2020-10-06 RX ADMIN — INSULIN GLARGINE 15 UNITS: 100 INJECTION, SOLUTION SUBCUTANEOUS at 21:19

## 2020-10-06 NOTE — PROGRESS NOTES
Pharmacokinetic Consult to Pharmacist    Emily Quinteroford is a 56 y.o. female being treated for sepsis with vancomycin and zosyn. Patient was started on vancomycin and zosyn on 10/3. Vancomycin was stopped on 10/5, but restarted today due to continued fevers. Height: 5' 3\" (160 cm)  Weight: 82.4 kg (181 lb 10.5 oz)  Lab Results   Component Value Date/Time    BUN 38 (H) 10/06/2020 03:29 AM    Creatinine 2.05 (H) 10/06/2020 03:29 AM    WBC 17.0 (H) 10/06/2020 03:29 AM    Procalcitonin 6.76 10/05/2020 03:09 AM      Estimated Creatinine Clearance: 28.9 mL/min (A) (based on SCr of 2.05 mg/dL (H)). CULTURES:  10/2 Blood cx: NG  10/2 Urine cx: NG  10/2 Sputum cx; NG    Lab Results   Component Value Date/Time    Vancomycin, random 11.5 10/06/2020 08:12 AM       Day 1 of vancomycin (restarted), day 4 overall. Goal trough is 15-20 mcg/ml. A random level was drawn this morning and equal to 11.5 mcg/ml (last dose on 10/4). Given patient's renal function will dose vancomycin by levels. Since level is below goal, will re-dose vancomycin 1 gram today and check a random level with AM labs on 10/7. Will continue to follow patient.       Thank you,  Karen Ochoa, PharmD, BCCCP    [facility]  [email or web address]

## 2020-10-06 NOTE — PROGRESS NOTES
Critical Care Daily Progress Note: 10/6/2020  Admission Date: 9/29/2020     The patient's chart is reviewed and the patient is discussed with the staff. 55 y/o female Patient had CABG x 4 on 9/29. Currently is sedated in CV-ICU and orally intubated receiving  mechanical ventilation. Pt presented to the ER at [facility] on 9/18/2020 with chest pain and dyspnea. She was found to be in afib RVR. She was started on cardizem and admitted by cardiology. She underwent LHC by Dr. Jeannie Caba which revealed MVCAD. She was extubated on day of surgery but reintubated 10/2  Subjective:     Off pressors. Still on Vent. On Cardene at this time.  Sedation vacation in AM was not successful and will try again in AM.started lasix drip per renal    Current Facility-Administered Medications   Medication Dose Route Frequency    furosemide (LASIX) 100 mg in 0.9% sodium chloride (MBP/ADV) 100 mL infusion  10 mg/hr IntraVENous CONTINUOUS    sodium chloride (NS) flush 30 mL  30 mL InterCATHeter Q8H    sodium chloride (NS) flush 30 mL  30 mL InterCATHeter PRN    niCARdipine in Saline (CARDENE) 25 MG/250 mL infusion kit  5-15 mg/hr IntraVENous TITRATE    piperacillin-tazobactam (ZOSYN) 3.375 g in 0.9% sodium chloride (MBP/ADV) 100 mL  3.375 g IntraVENous Q8H    LORazepam (ATIVAN) tablet 1 mg  1 mg Oral Q4H PRN    pantoprazole (PROTONIX) 40 mg in 0.9% sodium chloride 10 mL injection  40 mg IntraVENous Q24H    fentaNYL in normal saline (pf) 25 mcg/mL infusion  0-200 mcg/hr IntraVENous TITRATE    NOREPINephrine (LEVOPHED) 4 mg in 5% dextrose 250 mL infusion  0.5-16 mcg/min IntraVENous TITRATE    midazolam in normal saline (VERSED) 1 mg/mL infusion  0-10 mg/hr IntraVENous TITRATE    sodium chloride (NS) flush 5-40 mL  5-40 mL IntraVENous Q8H    sodium chloride (NS) flush 5-40 mL  5-40 mL IntraVENous PRN    midazolam (VERSED) injection 2 mg  2 mg IntraVENous Multiple    NUTRITIONAL SUPPORT ELECTROLYTE PRN ORDERS   Does Not Apply PRN    senna-docusate (PERICOLACE) 8.6-50 mg per tablet 2 Tab  2 Tab Oral BID    LORazepam (ATIVAN) injection 1 mg  1 mg IntraVENous Q4H PRN    traMADoL (ULTRAM) tablet 100 mg  100 mg Oral Q6H PRN    oxyCODONE-acetaminophen (PERCOCET) 5-325 mg per tablet 1 Tab  1 Tab Oral Q4H PRN    insulin regular (NOVOLIN R, HUMULIN R) injection   SubCUTAneous AC&HS    alcohol 62% (NOZIN) nasal  1 Ampule  1 Ampule Topical Q12H    rosuvastatin (CRESTOR) tablet 40 mg  40 mg Oral QHS    dextrose 5% - 0.45% NaCl with KCl 20 mEq/L infusion  25 mL/hr IntraVENous CONTINUOUS    sodium chloride (NS) flush 5-40 mL  5-40 mL IntraVENous Q8H    sodium chloride (NS) flush 5-40 mL  5-40 mL IntraVENous PRN    naloxone (NARCAN) injection 0.4 mg  0.4 mg IntraVENous PRN    sodium bicarbonate (8.4%) injection 50 mEq  50 mEq IntraVENous PRN    nitroglycerin (Tridil) 200 mcg/ml infusion   mcg/min IntraVENous TITRATE    amiodarone (CORDARONE) tablet 200 mg  200 mg Oral Q12H    metoprolol tartrate (LOPRESSOR) tablet 25 mg  25 mg Oral Q12H    ondansetron (ZOFRAN) injection 4 mg  4 mg IntraVENous Q4H PRN    dextrose (D50W) injection syrg 12.5 g  25 mL IntraVENous PRN    aspirin chewable tablet 81 mg  81 mg Oral DAILY    magnesium sulfate 1 g/100 ml IVPB (premix or compounded)  1 g IntraVENous PRN    EPINEPHrine (ADRENALIN) 4 mg in 0.9% sodium chloride 250 mL infusion (PRE-MIX)  0.01-0.05 mcg/kg/min IntraVENous TITRATE    acetaminophen (TYLENOL) tablet 650 mg  650 mg Oral Q4H PRN       Review of Systems   Unobtainable due to patient status -- sedative on Vent.            Objective:     Vitals:    10/05/20 2259 10/05/20 2314 10/05/20 2329 10/05/20 2344   BP: 130/60 128/61 (!) 128/57 135/61   Pulse: 73 72 74 75   Resp: 15 19 16 13   Temp: 99.6 °F (37.6 °C)      SpO2: 94% 94% 95% 95%   Weight:       Height:             Intake/Output Summary (Last 24 hours) at 10/6/2020 0013  Last data filed at 10/5/2020 6941  Gross per 24 hour   Intake 2506.11 ml   Output 2540 ml   Net -33.89 ml         Physical Exam:          Constitutional:  intubated and mechanically ventilated. EENMT:  Sclera clear, pupils equal, oral mucosa moist  Respiratory:  crackles  Cardiovascular:  RRR with no M,G,R;  Gastrointestinal:  soft with no tenderness; positive bowel sounds present  Musculoskeletal:  warm with no cyanosis, no lower extremity edema  Skin:  no jaundice or ecchymosis  Neurologic: no gross neuro deficits     Psychiatric:  Wakes up some    LINES:    ETT, central    DRIPS:      versed, fentanyl, Cardent    CXR: pending    Yesterday   Improved infiltrates today          Ventilator Settings  Mode FIO2 Rate Tidal Volume Pressure PEEP   Pressure support  40 %    450 ml  8 cm H2O  8 cm H20      Peak airway pressure: 17.2 cm H2O   Minute ventilation: 7.8 l/min     ABG:   Recent Labs     10/05/20  0351 10/04/20  0155 10/03/20  0403   PHI 7.41 7.43 7.42   PCO2I 43.9 45.6* 38.8   PO2I 109* 232* 81   HCO3I 27.9* 29.9* 24.9       LAB  Recent Labs     10/05/20  2213 10/05/20  1557 10/05/20  1138 10/05/20  0555 10/04/20  2115   GLUCPOC 211* 165* 162* 187* 175*     Recent Labs     10/05/20  0309 10/04/20  0425 10/03/20  0417   WBC 13.0* 11.1 15.2*   HGB 9.3* 9.4* 10.2*   HCT 28.8* 29.4* 31.8*    194 188     Recent Labs     10/05/20  0309 10/04/20  0420 10/03/20  0417    141 140   K 3.5 3.2* 3.3*    104 105   CO2 31 32 29   * 158* 142*   BUN 38* 31* 36*   CREA 1.96* 1.63* 1.68*   MG 2.8* 2.4 2.3   PHOS 2.0*  --   --    CA 7.8* 7.7* 7.8*     No results for input(s): LCAD, LAC in the last 72 hours.       Patient Active Problem List   Diagnosis Code    Paroxysmal atrial fibrillation (HCC) I48.0    Essential hypertension I10    Mixed hyperlipidemia E78.2    Uterine cancer ([facility] C55    Chest pain R07.9    Atrial fibrillation (HCC) I48.91    Paroxysmal A-fib (HCC) I48.0    Atherosclerosis of native coronary artery of native heart with unstable angina pectoris (HCC) I25.110    S/P CABG x 4 Z95.1    Suspected sleep apnea R29.818    CAD (coronary artery disease) I25.10    AMOR on CPAP G47.33, Z99.89    Pulmonary edema J81.1    Anxiety F41.9    Acute respiratory failure with hypoxia (HCC) J96.01    Bilateral pulmonary infiltrates on chest x-ray R91.8    Hypotension I95.9    YURI (acute kidney injury) ([facility] N17.9         Assessment:  (Medical Decision Making)     Hospital Problems  Date Reviewed: 9/22/2020          Codes Class Noted POA    YURI (acute kidney injury) ([facility] [facility] ICD-10-CM: N17.9  ICD-9-CM: 584.9  10/5/2020 Unknown         Hypotension ICD-10-CM: I95.9  ICD-9-CM: 458.9  10/3/2020 Unknown        Acute respiratory failure with hypoxia ([facility] ICD-10-CM: J96.01  ICD-9-CM: 518.81  10/2/2020 Unknown        Bilateral pulmonary infiltrates on chest x-ray ICD-10-CM: R91.8  ICD-9-CM: 793.19  10/2/2020 Unknown    Improved-increased procal but cultures negative    Pulmonary edema ICD-10-CM: J81.1  ICD-9-CM: 514  10/1/2020 Unknown        Anxiety ICD-10-CM: F41.9  ICD-9-CM: 300.00  10/1/2020 Unknown        AMOR on CPAP ICD-10-CM: G47.33, Z99.89  ICD-9-CM: 327.23, V46.8  9/30/2020 Unknown        Paroxysmal A-fib (HCC) ICD-10-CM: I48.0  ICD-9-CM: 427.31  9/29/2020 Unknown        Atherosclerosis of native coronary artery of native heart with unstable angina pectoris (HCC) ICD-10-CM: I25.110  ICD-9-CM: 414.01, 411.1  9/29/2020 Unknown        * (Principal) S/P CABG x 4 ICD-10-CM: Z95.1  ICD-9-CM: V45.81  9/29/2020 Unknown        CAD (coronary artery disease) ICD-10-CM: I25.10  ICD-9-CM: 414.00  9/29/2020 Unknown        Paroxysmal atrial fibrillation (HCC) ICD-10-CM: I48.0  ICD-9-CM: 427.31  9/18/2020 Unknown              Plan:  (Medical Decision Making)     --continue lasix drip per renal and making significant diureses   --taper FIO2 on vent and on PS currently. --try sedation holiday in AM and restart precedex to avoid agitation, use lower levels. Continue sedation for now with fentanyl, versed. Goal is to try to avoid a trach  --continue abx  Zosyn D4. Cultures are negative. S/p few doses of Vanco  --BP control per PMD  --DVt/PUD prophylaxis   --likely with AMOR and will need to extubate to BIPAP  --f/u labs and x-ray in AM  --continue remaining treatment per other specialist.     Time spent with care so far tonight is 31 minutes. Condition is critical    More than 50% of the time documented was spent in face-to-face contact with the patient and in the care of the patient on the floor/unit where the patient is located.     Ivy Bonds MD

## 2020-10-06 NOTE — PROGRESS NOTES
A follow up visit was made to the patient. Emotional support, spiritual presence and   prayer were provided for the patient. The patient was not alert. Her daughter, Bell Carmona, was present and she shared that her mother's vent may be removed soon.       LIV Olvera

## 2020-10-06 NOTE — ADT AUTH CERT NOTES
Coronary Artery Bypass Graft (CABG) - Care Day 7 (10/5/2020) by Brenda Bañuelos RN  
 
   
Review Status  Review Entered Completed  10/6/2020 10:38   
   
Criteria Review Care Day: 7 Care Date: 10/5/2020 Level of Care: ICU Guideline Day 2 Clinical Status   
(X) * Procedure completed ( ) * Hemodynamic stability ( ) * No evidence of myocardial ischemia   
( ) * Mechanical ventilation absent Activity ( ) * Up to chair Routes ( ) * Clear liquid diet, advance as tolerated Interventions   
(X) Oxygen   
(X) Glucose control Medications   
(X) Statin * Milestone Additional Notes 10/5 IP ICU Blood pressure (!) 109/55, pulse 62, temperature 100.1 °F (37.8 °C), resp. rate 18,  SpO2 98 %. On Ventilator Subjective:   
    
Incisional pain:  moderate Appetite:  on tube feedings Activity:   sedated on vent Plan/Recommendations/Medical Decision Making:   
    
Principal Problem:   
  S/P CABG x 4 (9/29/2020)   
  Active Problems:   
  Paroxysmal atrial fibrillation ([address] (9/18/2020)     
  Paroxysmal A-fib ([address] (9/29/2020)     
  Atherosclerosis of native coronary artery of native heart with unstable angina pectoris ([address] (9/29/2020)     
  CAD (coronary artery disease) (9/29/2020)     
  AMOR on CPAP (9/30/2020)     
  Pulmonary edema (10/1/2020)     
  Anxiety (10/1/2020)     
  Acute respiratory failure with hypoxia ([address] (10/2/2020)     
  Bilateral pulmonary infiltrates on chest x-ray (10/2/2020)     
  Hypotension (10/3/2020)     
  YURI (acute kidney injury) ([address] (10/5/2020)     
    
    
Continue present treatment CXR looks better.  Will give sedation vacation.   Plan trach this week if unable to extubate Pulmonary Note:   
    
55 y/o female Patient had CABG x 4 on 9/29.  Currently is sedated in CV-ICU and orally intubated receiving  mechanical ventilation. [place] [facility] [place] chest pain and dyspnea. She was found to be in afib RVR. She was started on cardizem and admitted by cardiology. She underwent LHC by Dr. Ras Roth which revealed MVCAD. She was extubated on day of surgery but reintubated 10/2 Subjective:   
Remains on vent support  And 2 mics of levophed   
    
Meds:   
· potassium chloride 20 mEq in 100 ml IVPB 20 mEq IntraVENous ONCE   
· vancomycin (VANCOCIN) 1500 mg in  ml infusion 1,500 mg IntraVENous Q24H · piperacillin-tazobactam (ZOSYN) 3.375 g in 0.9% sodium chloride (MBP/ADV) 100 mL 3.375 g IntraVENous Q8H   
· pantoprazole (PROTONIX) 40 mg in 0.9% sodium chloride 10 mL injection 40 mg IntraVENous Q24H   
· fentaNYL in normal saline (pf) 25 mcg/mL infusion 0-200 mcg/hr IntraVENous TITRATE   
· NOREPINephrine (LEVOPHED) 4 mg in 5% dextrose 250 mL infusion 0.5-16 mcg/min IntraVENous TITRATE · midazolam in normal saline (VERSED) 1 mg/mL infusion 0-10 mg/hr IntraVENous TITRATE · insulin regular (NOVOLIN R, HUMULIN R) injection SubCUTAneous AC&HS · rosuvastatin (CRESTOR) tablet 40 mg 40 mg Oral QHS · dextrose 5% - 0.45% NaCl with KCl 20 mEq/L infusion 25 mL/hr IntraVENous CONTINUOUS   
· nitroglycerin (Tridil) 200 mcg/ml infusion  mcg/min IntraVENous TITRATE · amiodarone (CORDARONE) tablet 200 mg 200 mg Oral Q12H   
· metoprolol tartrate (LOPRESSOR) tablet 25 mg 25 mg Oral Q12H · aspirin chewable tablet 81 mg 81 mg Oral DAILY · magnesium sulfate 1 g/100 ml IVPB (premix or compounded) 1 g IntraVENous PRN   
· EPINEPHrine (ADRENALIN) 4 mg in 0.9% sodium chloride 250 mL infusion (PRE-MIX) 0.01-0.05 mcg/kg/min IntraVENous TITRATE · niCARdipine in Saline (CARDENE) 25 MG/250 mL infusion kit 5-15 mg/hr IntraVENous TITRATE   
    
Plan:  (Medical Decision Making) 1    Needs renal consult 2    Vent support- can try on pressure support 3    Sedation vacation 4    Continue antibx for now- no mrsa -can probably stop vanc Labs: WBC: 13.0 (H) RBC: 3.20 (L) HGB: 9.3 (L) HCT: 28.8 (L) MCV: 90.0 MCH: 29.1 MCHC: 32.3   
RDW: 15.6 (H) PLATELET: 160 MPV: 11.0 ABSOLUTE NRBC: 0.03 Sodium: 140 Potassium: 3.5 Chloride: 105 CO2: 31 Anion gap: 4 (L) Glucose: 155 (H) BUN: 38 (H) Creatinine: 1.96 (H) Calcium: 7.8 (L) Phosphorus: 2.0 (L) Magnesium: 2.8 (H) GFR est non-AA: 27 (L) GFR est AA: 33 (L) Procalcitonin: 6.76   
  
10/5/2020 03:51 Exhaled minute volume: 7.60   
pH (POC): 7.41   
pCO2 (POC): 43.9   
pO2 (POC): 109 (H) HCO3 (POC): 27.9 (H)   
sO2 (POC): 98 Base excess (POC): 3 FIO2 (POC): 40   
Specimen type (POC): ARTERIAL Set Rate: 18 Site: LEFT RADIAL Device[de-identified] VENT Mode: ASSIST CONTROL   
PEEP/CPAP (POC): 14 Pressure control: 16 Allens test (POC): YES Respiratory comment[de-identified] NurseNotified Inspiratory Time: 1.11   
  
   
10/2/20 Procedure Note by Luis Carlos Alfaro RN  
 
   
Review Status  Review Entered In Primary  10/6/2020 10:24   
   
Criteria Review Pre-procedure Diagnoses Acute respiratory failure with hypoxia ([address] [J96.01] Bilateral pulmonary infiltrates on chest x-ray [R91.8] Post-procedure Diagnoses Acute respiratory failure with hypoxia ([address] [J96.01] Bilateral pulmonary infiltrates on chest x-ray [R91.8] Procedures GA [address]/BRNCL ALVEOLAR LAVAGE [id number] (CPT®)]   
  
    
  
 
[]? Hide copied text 
  
[]? Hover for details PROCEDURE 
  
Bronchoscopy with airway inspection /BAL. 
  
INDICATION  
Acute respiratory failure with hypoxia and B pulmonary infiltrates.  
  
EQUIPMENT: 
Olympus Q 180 Bronchoscope 
  
ANESTHESIA 
  
Concious sedation with: Fentanyl drip - see ICU records. Versed 2mg IV; Lidocaine 100 mg to tracheo-bronchial tree and vocal cords 
  IMAGING: 
CXR 10/2/2020 
 
  
  
AIRWAY INSPECTION 
  
 After obtaining informed consent, through an endotracheal tube, an Olympus Q 180 Bronchoscope was introduced into the trachea without complication. 
  
                                                            RIGHT 
  
LOCATION NORM/ABNORM DESCRIPTION Larynx ETT    
VOCAL CORDS ETT    
TRACHEA NL    
LESLEY NL    
RMSB NL    
RUL NL    
BI NL    
RML NL    
RLL NL    
SUP SEGM RLL NL    
MED BASAL NL    
ANTERIOR BASAL NL    
LATERAL BASAL NL    
POSTERIOR BASAL NL    
  
                         
                                                            LEFT 
  
LOCATION NORM/ABNORM DESCRIPTION  
LMSB NL    
EL NL    
LINGULA NL    
LLL NL    
SUPERIOR SEG LLL NL    
JIA-MEDIAL LLL NL    
LATERAL LLL NL    
POSTERIOR LLL NL    
  
There were little to no secretions seen throughout the airways.  
  
The following samples were obtained: 
  
BAL: RML 
  
Samples were sent for: 
Cell count, diff, routine culture, afb, fungus, atypical pneumonia, legionella, respiratory viral PCR.  
  
The procedure was completed without complication and the patient tolerated the procedure well. 
  
EBL: None 
  
Recommendations: Follow up the above studies to help determine if the patient's pulmonary infiltrates are due to infection or bland pulmonary edema. There were no signs of alveolar hemorrhage on 3 subsequent aliquants of fluid returned.  
  
Fadia Flores MD 
  
   
  
   
   
Coronary Artery Bypass Graft (CABG) - Care Day 6 (10/4/2020) by Maciel Woodward RN  
 
   
Review Status  Review Entered Completed  10/6/2020 10:06   
   
Criteria Review Care Day: 6 Care Date: 10/4/2020 Level of Care: ICU Guideline Day 2 Clinical Status   
(X) * Procedure completed ( ) * Hemodynamic stability ( ) * No evidence of myocardial ischemia   
( ) * Mechanical ventilation absent Activity ( ) * Up to chair Routes ( ) * Clear liquid diet, advance as tolerated Interventions   
(X) Oxygen (X) Glucose control Medications   
(X) Statin 10/6/2020 10:06:22 EDT by Ang Villegas   
  rosuvastatin (CRESTOR) tablet 40 mg   
Dose: 40 mg 
Freq: EVERY BEDTIME Route: PO   
* Milestone Additional Notes 10/4 IP ICU   
103/57, 100.2, 80 18 97 % Ventilator POD 2 Days Post-Op   
    
Procedure:  Procedure(s): BRONCHOSCOPY ROOM 104 BRONCHIAL ALVEOLAR LAVAGE   
    
    
Subjective:   
    
Sedated on vent   
    
Heart:  regular rate and rhythm, S1, S2 normal, no murmur, click, rub or gallop Lung:  clear to auscultation bilaterally Neuro: Grossly non focal   
Incisions: Clean, dry, and intact Assessment:   
    
Principal Problem:   
  S/P CABG x 4 (9/29/2020)   
  Active Problems:   
  Paroxysmal atrial fibrillation ([address] (9/18/2020)     
  Paroxysmal A-fib ([address] (9/29/2020)     
  Atherosclerosis of native coronary artery of native heart with unstable angina pectoris ([address] (9/29/2020)     
  CAD (coronary artery disease) (9/29/2020)     
  AMOR on CPAP (9/30/2020)     
  Pulmonary edema (10/1/2020)     
  Anxiety (10/1/2020)     
  Acute respiratory failure with hypoxia ([address] (10/2/2020)     
  Bilateral pulmonary infiltrates on chest x-ray (10/2/2020)     
  Hypotension (10/3/2020)     
    
Labs:   
Sodium: 141 Potassium: 3.2 (L) Chloride: 104 CO2: 32 Anion gap: 5 (L) Glucose: 158 (H) BUN: 31 (H) Creatinine: 1.63 (H) Calcium: 7.7 (L) Magnesium: 2.4 GFR est non-AA: 34 (L)   
GFR est AA: 41 (L) WBC: 11.1 RBC: 3.28 (L) HGB: 9.4 (L) HCT: 29.4 (L) MCV: 89.6 MCH: 28.7 MCHC: 32.0   
RDW: 15.2 (H) PLATELET: 556 MPV: 10.9 ABSOLUTE NRBC: 0.00 CT: IMPRESSION:   
1. Bilateral airspace and groundglass opacities in addition to small pleural   
effusions. The findings would raise suspicion for pulmonary edema and/or   
pneumonia. 2. Cardiomegaly. New meds: potassium chloride 40 mEq IVPB     
Dose: 40 mEq Freq: ONCE Route: IV   
  
   
Coronary Artery Bypass Graft (CABG) - Care Day 5 (10/3/2020) by Wilman Rae RN  
 
   
Review Status  Review Entered Completed  10/6/2020 09:55   
   
Criteria Review Care Day: 5 Care Date: 10/3/2020 Level of Care: ICU Guideline Day 2 Clinical Status   
(X) * Procedure completed ( ) * Hemodynamic stability ( ) * No evidence of myocardial ischemia   
( ) * Mechanical ventilation absent Activity ( ) * Up to chair Routes ( ) * Clear liquid diet, advance as tolerated Interventions   
(X) Oxygen   
(X) Glucose control Medications   
(X) Beta-blocker   
(X) Statin * Milestone Additional Notes 10/3 IP ICU   
97.9, (!) 89/50 - 88 19 94 % Ventilator POD 1 Day Post-Op   
    
Procedure:  Procedure(s): BRONCHOSCOPY ROOM 104 BRONCHIAL ALVEOLAR LAVAGE   
    
    
Subjective:   
    
Sedated on vent Hemodynamics   
  PAP   
CO (l/min): 4.8 l/min CO   
CI (l/min/m2): 2.6 l/min/m2 CI   
    
10/03 0701 - 10/03 1900 In: [de-identified] Out: 65 [Urine:65] 10/01 1901 - 10/03 0700 In: 3073.1 [I.V.:2078.1] Out: 2310 [Urine:2310]   
    
CT Drainage      
  total of all CT's   
    
Heart:  regular rate and rhythm, S1, S2 normal, no murmur, click, rub or gallop Lung:  clear to auscultation bilaterally Neuro: Grossly non focal   
Incisions: Clean, dry, and intact   
 Assessment:   
    
Principal Problem:   
  S/P CABG x 4 (9/29/2020)   
  Active Problems:   
  Paroxysmal atrial fibrillation ([address] (9/18/2020)     
  Paroxysmal A-fib ([address] (9/29/2020)     
  Atherosclerosis of native coronary artery of native heart with unstable angina pectoris ([address] (9/29/2020)     
  CAD (coronary artery disease) (9/29/2020)     
  AMOR on CPAP (9/30/2020)     
  Pulmonary edema (10/1/2020)     
  Anxiety (10/1/2020)   
    
   Acute respiratory failure with hypoxia ([facility] (10/2/2020)     
  Bilateral pulmonary infiltrates on chest x-ray (10/2/2020)     
  Hypotension (10/3/2020)     
    
    
Plan/Recommendations/Medical Decision Making:   
    
Soft BP with rising Cr, stop Lasix, supportive care, stable   
    
Labs: ABGs:   
Exhaled minute volume: 8.20   
pH (POC): 7.42   
pCO2 (POC): 38.8   
pO2 (POC): 81 HCO3 (POC): 24.9   
sO2 (POC): 96 Base excess (POC): 0   
FIO2 (POC): 85   
Specimen type (POC): ARTERIAL Set Rate: 18 Site: RIGHT BRACHIAL Device[de-identified] VENT Mode: ASSIST CONTROL   
PEEP/CPAP (POC): 14 Pressure control: 16 Allens test (POC): NOT APPLICABLE Respiratory comment[de-identified] NurseNotified Inspiratory Time: 1.11   
WBC: 15.2 (H) RBC: 3.52 (L) HGB: 10.2 (L) HCT: 31.8 (L) MCV: 90.3 MCH: 29.0 MCHC: 32.1 RDW: 15.1 (H) PLATELET: 336 MPV: 11.3 ABSOLUTE NRBC: 0.00 Sodium: 140 Potassium: 3.3 (L) Chloride: 105 CO2: 29 Anion gap: 6 (L) Glucose: 142 (H) BUN: 36 (H) Creatinine: 1.68 (H) Calcium: 7.8 (L) Magnesium: 2.3 GFR est non-AA: 33 (L)   
GFR est AA: 40 (L)   
NT pro-BNP: 6,526 (H) CXR: IMPRESSION: Unchanged pulmonary edema. Meds:   
acetaminophen (TYLENOL) tablet 650 mg     
Dose: 650 mg   
Freq: EVERY 4 HOURS AS NEEDED Route: PO x1   
  
aspirin chewable tablet 81 mg     
Dose: 81 mg   
Freq: DAILY Route: PO   
  
fentaNYL in normal saline (pf) 25 mcg/mL infusion     
Rate: 0-8 mL/hr Dose: 0-200 mcg/hr Freq: TITRATE Route: IV   
  
insulin regular (NOVOLIN R, HUMULIN R) injection     
Freq: 4 TIMES DAILY BEFORE MEALS & NIGHTLY Route: SC   
  
midazolam in normal saline (VERSED) 1 mg/mL infusion     
Rate: 0-10 mL/hr Dose: 0-10 mg/hr Freq: TITRATE Route: IV   
  
NOREPINephrine (LEVOPHED) 4 mg in 5% dextrose 250 mL infusion     
Rate: 1.9-60 mL/hr Dose: 0.5-16 mcg/min Freq: TITRATE Route: IV   
  
 pantoprazole (PROTONIX) 40 mg in 0.9% sodium chloride 10 mL injection     
Dose: 40 mg   
Freq: EVERY 24 HOURS Route: IV    
  
piperacillin-tazobactam (ZOSYN) 3.375 g in 0.9% sodium chloride (MBP/ADV) 100 mL     
Dose: 3.375 g Freq: EVERY 8 HOURS Route: IV   
  
rosuvastatin (CRESTOR) tablet 40 mg     
Dose: 40 mg   
Freq: EVERY BEDTIME Route: PO   
  
furosemide (LASIX) injection 60 mg     
Dose: 60 mg   
Freq: 2 TIMES DAILY Route: IV   
  
cefTRIAXone (ROCEPHIN) 2 g in 0.9% sodium chloride (MBP/ADV) 50 mL     
Dose: 2 g   
Freq: EVERY 24 HOURS Route: IV

## 2020-10-06 NOTE — PROGRESS NOTES
Bedside shift report given to Rachel Dye RN (oncoming nurse) by Donnell Cannon RN (offgoing nurse). Bedside shift report included the following information: SBAR, Kardex, Procedure Summary, MAR, and Recent Results.

## 2020-10-06 NOTE — PROGRESS NOTES
[facility]    Follow-Up on: YURI on CKD    HPI: Pt intubated and sedated.  Seen An examined    ROS:  UTO    Current Facility-Administered Medications   Medication Dose Route Frequency    dexmedeTOMidine (PRECEDEX) 400 mcg in 0.9% sodium chloride 100 mL infusion  0.2-0.7 mcg/kg/hr IntraVENous TITRATE    vancomycin (VANCOCIN) 1,000 mg in 0.9% sodium chloride (MBP/ADV) 250 mL  1,000 mg IntraVENous ONCE    Vancomycin Intermittent Dosing   Other Rx Dosing/Monitoring    furosemide (LASIX) 100 mg in 0.9% sodium chloride (MBP/ADV) 100 mL infusion  10 mg/hr IntraVENous CONTINUOUS    sodium chloride (NS) flush 30 mL  30 mL InterCATHeter Q8H    sodium chloride (NS) flush 30 mL  30 mL InterCATHeter PRN    niCARdipine in Saline (CARDENE) 25 MG/250 mL infusion kit  5-15 mg/hr IntraVENous TITRATE    piperacillin-tazobactam (ZOSYN) 3.375 g in 0.9% sodium chloride (MBP/ADV) 100 mL  3.375 g IntraVENous Q8H    LORazepam (ATIVAN) tablet 1 mg  1 mg Oral Q4H PRN    pantoprazole (PROTONIX) 40 mg in 0.9% sodium chloride 10 mL injection  40 mg IntraVENous Q24H    fentaNYL in normal saline (pf) 25 mcg/mL infusion  0-200 mcg/hr IntraVENous TITRATE    NOREPINephrine (LEVOPHED) 4 mg in 5% dextrose 250 mL infusion  0.5-16 mcg/min IntraVENous TITRATE    midazolam in normal saline (VERSED) 1 mg/mL infusion  0-10 mg/hr IntraVENous TITRATE    sodium chloride (NS) flush 5-40 mL  5-40 mL IntraVENous Q8H    sodium chloride (NS) flush 5-40 mL  5-40 mL IntraVENous PRN    midazolam (VERSED) injection 2 mg  2 mg IntraVENous Multiple    NUTRITIONAL SUPPORT ELECTROLYTE PRN ORDERS   Does Not Apply PRN    senna-docusate (PERICOLACE) 8.6-50 mg per tablet 2 Tab  2 Tab Oral BID    LORazepam (ATIVAN) injection 1 mg  1 mg IntraVENous Q4H PRN    traMADoL (ULTRAM) tablet 100 mg  100 mg Oral Q6H PRN    oxyCODONE-acetaminophen (PERCOCET) 5-325 mg per tablet 1 Tab  1 Tab Oral Q4H PRN    insulin regular (NOVOLIN R, HUMULIN R) injection SubCUTAneous AC&HS    alcohol 62% (NOZIN) nasal  1 Ampule  1 Ampule Topical Q12H    rosuvastatin (CRESTOR) tablet 40 mg  40 mg Oral QHS    dextrose 5% - 0.45% NaCl with KCl 20 mEq/L infusion  25 mL/hr IntraVENous CONTINUOUS    sodium chloride (NS) flush 5-40 mL  5-40 mL IntraVENous Q8H    sodium chloride (NS) flush 5-40 mL  5-40 mL IntraVENous PRN    naloxone (NARCAN) injection 0.4 mg  0.4 mg IntraVENous PRN    sodium bicarbonate (8.4%) injection 50 mEq  50 mEq IntraVENous PRN    nitroglycerin (Tridil) 200 mcg/ml infusion   mcg/min IntraVENous TITRATE    amiodarone (CORDARONE) tablet 200 mg  200 mg Oral Q12H    metoprolol tartrate (LOPRESSOR) tablet 25 mg  25 mg Oral Q12H    ondansetron (ZOFRAN) injection 4 mg  4 mg IntraVENous Q4H PRN    dextrose (D50W) injection syrg 12.5 g  25 mL IntraVENous PRN    aspirin chewable tablet 81 mg  81 mg Oral DAILY    magnesium sulfate 1 g/100 ml IVPB (premix or compounded)  1 g IntraVENous PRN    EPINEPHrine (ADRENALIN) 4 mg in 0.9% sodium chloride 250 mL infusion (PRE-MIX)  0.01-0.05 mcg/kg/min IntraVENous TITRATE    acetaminophen (TYLENOL) tablet 650 mg  650 mg Oral Q4H PRN       Exam:  Vitals:    10/06/20 0944 10/06/20 0959 10/06/20 1030 10/06/20 1039   BP: (!) 120/56 (!) 113/56 130/60    Pulse: 60 (!) 59 60 62   Resp: 26 24 20 22   Temp:       SpO2: 92% 93% 93% 93%   Weight:       Height:             Intake/Output Summary (Last 24 hours) at 10/6/2020 1101  Last data filed at 10/6/2020 0944  Gross per 24 hour   Intake 5383.29 ml   Output 6690 ml   Net -1306.71 ml     PE:  GEN - in no distress  CV - regular, no murmur, no rub  Lung - clear bilaterally  Abd - soft, nontender  Ext - no edema    Labs  Recent Labs     10/06/20  0329 10/05/20  0309 10/04/20  0425   WBC 17.0* 13.0* 11.1   HGB 8.8* 9.3* 9.4*   HCT 26.8* 28.8* 29.4*    234 194     Recent Labs     10/06/20  0329 10/05/20  0309 10/04/20  0420    140 141   K 3.3* 3.5 3.2*    105 104   CO2 30 31 32   BUN 38* 38* 31*   CREA 2.05* 1.96* 1.63*   * 155* 158*   CA 7.2* 7.8* 7.7*   MG 2.1 2.8* 2.4   PHOS  --  2.0*  --      No results for input(s): PH, PCO2, PO2, PCO2 in the last 72 hours. Problem List:  Patient Active Problem List    Diagnosis Date Noted    YURI (acute kidney injury) ([facility] 10/05/2020    Hypotension 10/03/2020    [facility] failure with hypoxia ([facility] 10/02/2020    Bilateral pulmonary infiltrates on chest x-ray 10/02/2020    Pulmonary edema 10/01/2020    Anxiety 10/01/2020    AMOR on CPAP 09/30/2020    Paroxysmal A-fib ([facility] 09/29/2020    Atherosclerosis of native coronary artery of native heart with unstable angina pectoris ([facility] 09/29/2020    S/P CABG x 4 09/29/2020    Suspected sleep apnea 09/29/2020    CAD (coronary artery disease) 09/29/2020    Paroxysmal atrial fibrillation ([facility] 09/18/2020    Essential hypertension 09/18/2020    Mixed hyperlipidemia 09/18/2020    Uterine cancer ([facility] 09/18/2020    Chest pain 09/18/2020    Atrial fibrillation ([facility] 09/18/2020       Issues Addressed By Nephrology:    Plan:  1. S/P CABG X4 V  2. Resp Failure  3. Pulmonary edema  4.  YURI on CKD  Too much volume infusing in face of overload have dietary and pharmacy adjust.  Continue lasix drip 24 more hours then stop in am.`

## 2020-10-06 NOTE — PROGRESS NOTES
Bedside shift report received from Tanika Gonzalez RN (offgoing nurse). Report given to Vernell Childress RN. Vital signs stable. No complaints present. Patient in stable condition.

## 2020-10-06 NOTE — PROGRESS NOTES
Today's Date: 10/6/2020  Date of Admission: 9/29/2020    Chart Reviewed. Subjective:     Pt sedated on vent. Medications Reviewed. Objective:     Vitals:    10/06/20 1200 10/06/20 1214 10/06/20 1229 10/06/20 1245   BP: (!) 146/64 (!) 142/63 134/63 (!) 163/69   Pulse: 64 64 64 65   Resp: 21 20 26 22   Temp:       SpO2: 93% 99% 92% 94%   Weight:       Height:           Intake and Output  Current Shift: 10/06 0701 - 10/06 1900  In: 1606.7 [I.V.:1221.7]  Out: 8575 [RUUIX:4835]   Last 3 Shifts: 10/04 1901 - 10/06 0700  In: 4461.6 [I.V.:1867.6]  Out: 6290 [Urine:6290]    Physical Exam:  General: Well Developed, Obese, No Acute Distress, on vent  Neck: supple, no JVD  Heart: S1S2 with RRR  Lungs: decreased bilaterally   Abd: soft, + bowel sounds  Ext: + edema bilaterally  Skin: warm and dry    LABS  Data Review:   Recent Labs     10/06/20  0329 10/05/20  0309    140   K 3.3* 3.5   MG 2.1 2.8*   BUN 38* 38*   CREA 2.05* 1.96*   * 155*   WBC 17.0* 13.0*   HGB 8.8* 9.3*   HCT 26.8* 28.8*    234       Estimated Creatinine Clearance: 28.9 mL/min (A) (based on SCr of 2.05 mg/dL (H)).       Assessment/Plan:     Principal Problem:    S/P CABG x 4 (9/29/2020)  Remains on vent after re-intubation 10/2, still febrile, cultures neg to date, on IV antibiotics    Active Problems:    Paroxysmal atrial fibrillation (HCC) (9/18/2020)    currnelty      Paroxysmal A-fib ([facility] (9/29/2020)    S/p MAZE, in sinus/junctional currently       Atherosclerosis of native coronary artery of native heart with unstable angina pectoris ([facility] (9/29/2020)        CAD (coronary artery disease) (9/29/2020)  S/p CABG       AMOR on CPAP (9/30/2020)      Pulmonary edema (10/1/2020)  On IV lasix       Anxiety (10/1/2020)        Acute respiratory failure with hypoxia ([address] (10/2/2020)  Remains on vent      Bilateral pulmonary infiltrates on chest x-ray (10/2/2020)  On IV lasix for diuresis      Hypotension (10/3/2020)  Resolved YURI (acute kidney injury) ([facility] (10/5/2020)    Nephrology following     DM  Was not on any meds at home, Hgb A1C 8.2 pre-op, will add Indio Singh PA-C

## 2020-10-06 NOTE — PROGRESS NOTES
Comprehensive Nutrition Assessment    Type and Reason for Visit: Reassess(TF management (cardio surgery))    Nutrition Recommendations/Plan:   Continue Glucerna 1.5 via NGT at 45 ml/hr. Decrease free water flush to 25 ml Q6. PSTF 2 packet with 50 ml water before and after. Regimen to provide: 1620 kcal (100% estimated calorie needs), 111 grams protein (100% estimated protein needs) and ~1L free water. Defer electrolyte replacement to Nephrology. Nutrition Assessment:  Patient admitted for CABG. She has a PMH significant for HTNm CHF, HLD, afib, NSTEMI. S/p CABG 10/1. She was extubated after surgery, but had to be re-intubated 10/2. Received call from RN. Nephrology would like total volume ~1.2 L. Patient is currently on ~2L IVF with medications. Perfect serve to Dr. Raghav Lanza that free water could be reduce to maintenance fluids to provide ~1L free water. She states she will have pharmacy concentrate medications if possible. Labs: Glucose 216, GFR 26  Medications: Zosyn, Vanco, Precedex (held), Caradene gtt, Lasix, SSI, Protonix, Pericolace, 40 meq KCl replacement this am  Abdomen: Active BS, + BM this am    Estimated Daily Nutrient Needs:  Energy (kcal):  0731-0474(22-02 kcal/kg (85.4kg))  Protein (g):  >104(>2 g/kkg IBW (52.3kg))       Fluid (ml/day):  ~1.2 L(per nephrology)    Wounds:    Surgical wound       Current Nutrition Therapies:  DIET NPO  DIET TUBE FEEDING Open order for details. Give 2 packets Prosource TF at 9A with 50 ml water flush before and after. Hold tube feed if MAP less than 65. Keep HOB > 30 degrees. Check residuals every 4 hours. Hold TF for > 500 ml x 1 or 250 ml x 2 c. ..   Current Tube Feeding (TF) Orders:   · Feeding Route: Nasogastric  · Formula: Glucerna 1.5  · Schedule:Continuous    · Regimen: 45 ml/hr  · Additives/Modulars: Protein(PSTF x packets daily, 50 ml water flush before and after)  · Water Flushes: 30 ml/hr  · Goal TF & Flush Orders Provides: 1620 kcal (100% estimated calorie needs), 111 grams protein (100% estimated protein needs) and 1639ml free fluid (~1ml/kcal). Anthropometric Measures:  · Height:  5' 3\" (160 cm)  · Current Body Wt:  82.4 kg (181 lb 10.5 oz)(bed scale)   · Body mass index is 32.18 kg/m². · BMI Category:  Obese class 1 (BMI 30.0-34. 9)       Nutrition Diagnosis:   · Inadequate oral intake related to impaired respiratory function as evidenced by (intubated)      Nutrition Interventions:   Food and/or Nutrient Delivery: Continue NPO, Modify tube feeding(decreased free water flush)  Coordination of Nutrition Care: (Discussed with Vikas Magallanes RN and [address] [place] [address] Dr. Lonnie Wilson)    Goals:  Continue to meet at least 75% nutrition needs with EN       Nutrition Monitoring and Evaluation:   Food/Nutrient Intake Outcomes: Enteral nutrition intake/tolerance  Physical Signs/Symptoms Outcomes: Biochemical data, GI status    Discharge Planning:     Too soon to determine     [address] [place], LD on 10/6/2020 at 11:45 AM  Contact: [phone]

## 2020-10-06 NOTE — PROGRESS NOTES
Ventilator check complete; patient has a #7.5 ET tube secured at the 23 at her teeth. Patient is not alert. Patient is able to nod her head for questions. Breath sounds are diminished. Trachea is midline, Negative for subcutaneous air, and chest excursion is symmetrical. Patient is also Negative for cyanosis and is Negative for pitting edema. All alarms are set and audible. Resuscitation bag and mask are at the head of the bed.       Added additional PSV from 5 to 8 for decline in SpO2         Ventilator Settings  Mode FIO2 Rate Tidal Volume Pressure PEEP I:E Ratio   PSV  40 %   spont 21 spont 371  8 cm H2O  8 cm H20  varies      Peak airway pressure: 16 cm H2O   Minute ventilation: 7.8l/min

## 2020-10-06 NOTE — PROGRESS NOTES
Ventilator check complete; patient has a #7.5 ET tube secured at the 23 at the teeth. Patient is not sedated. Patient is not able to follow commands. Breath sounds are diminished. Trachea is midline, Negative for subcutaneous air, and chest excursion is symmetric. Patient is also Negative for cyanosis and is Negative for pitting edema. All alarms are set and audible. Resuscitation bag is at the head of the bed. Ventilator Settings  Mode FIO2 Rate Tidal Volume Pressure PEEP I:E Ratio   Pressure support  40 %    450 ml  8 cm H2O  8 cm H20  1:2      Peak airway pressure: 16.9 cm H2O   Minute ventilation: 6.1 l/min     ABG: No results for input(s): PH, PCO2, PO2, HCO3 in the last 72 hours.       Sharon Avendano, RT

## 2020-10-06 NOTE — PROGRESS NOTES
Labs noted with WBC up and Cr going up. CXR noted more congested today with possible effusions. Did  Have 6 L of urine output for negative 1.5 L. On lasix drip. On Zosyn and give fevers will restart Vanco. Get new R/C as well    Tracee Jarquin MD      LABS    Recent Labs     10/06/20  0329 10/05/20  0309 10/04/20  0425   WBC 17.0* 13.0* 11.1   HGB 8.8* 9.3* 9.4*   HCT 26.8* 28.8* 29.4*    234 194     Recent Labs     10/06/20  0329 10/05/20  0309 10/04/20  0420    140 141   K 3.3* 3.5 3.2*    105 104   * 155* 158*   CO2 30 31 32   BUN 38* 38* 31*   CREA 2.05* 1.96* 1.63*   MG 2.1 2.8* 2.4   PHOS  --  2.0*  --      Recent Labs     10/06/20  0311 10/05/20  0351 10/04/20  0155   PHI 7.48* 7.41 7.43   PCO2I 39.7 43.9 45.6*   PO2I 67* 109* 232*   HCO3I 29.6* 27.9* 29.9*     No results for input(s): LCAD, LAC in the last 72 hours. Today increased congestion vs yesterday with possible effusion/atelectatics.            Vs 10/2

## 2020-10-07 ENCOUNTER — APPOINTMENT (OUTPATIENT)
Dept: ULTRASOUND IMAGING | Age: 60
DRG: 228 | End: 2020-10-07
Attending: NURSE PRACTITIONER
Payer: COMMERCIAL

## 2020-10-07 ENCOUNTER — APPOINTMENT (OUTPATIENT)
Dept: GENERAL RADIOLOGY | Age: 60
DRG: 228 | End: 2020-10-07
Attending: INTERNAL MEDICINE
Payer: COMMERCIAL

## 2020-10-07 ENCOUNTER — APPOINTMENT (OUTPATIENT)
Dept: GENERAL RADIOLOGY | Age: 60
DRG: 228 | End: 2020-10-07
Attending: THORACIC SURGERY (CARDIOTHORACIC VASCULAR SURGERY)
Payer: COMMERCIAL

## 2020-10-07 LAB
ANION GAP SERPL CALC-SCNC: 4 MMOL/L (ref 7–16)
ARTERIAL PATENCY WRIST A: ABNORMAL
ARTERIAL PATENCY WRIST A: YES
ARTERIAL PATENCY WRIST A: YES
BACTERIA SPEC CULT: NORMAL
BACTERIA SPEC CULT: NORMAL
BASE EXCESS BLD CALC-SCNC: 11 MMOL/L
BASE EXCESS BLD CALC-SCNC: 12 MMOL/L
BASE EXCESS BLD CALC-SCNC: 12 MMOL/L
BDY SITE: ABNORMAL
BUN SERPL-MCNC: 44 MG/DL (ref 8–23)
CALCIUM SERPL-MCNC: 8.7 MG/DL (ref 8.3–10.4)
CHLORIDE SERPL-SCNC: 102 MMOL/L (ref 98–107)
CO2 BLD-SCNC: 35 MMOL/L
CO2 BLD-SCNC: 37 MMOL/L
CO2 BLD-SCNC: 37 MMOL/L
CO2 SERPL-SCNC: 36 MMOL/L (ref 21–32)
COLLECT TIME,HTIME: 1220
COLLECT TIME,HTIME: 1350
COLLECT TIME,HTIME: 355
CREAT SERPL-MCNC: 2.7 MG/DL (ref 0.6–1)
ERYTHROCYTE [DISTWIDTH] IN BLOOD BY AUTOMATED COUNT: 15.3 % (ref 11.9–14.6)
EXHALED MINUTE VOLUME, VE: 6.7 L/MIN
EXHALED MINUTE VOLUME, VE: 7.4 L/MIN
EXHALED MINUTE VOLUME, VE: 8.6 L/MIN
FLUAV RNA SPEC QL NAA+PROBE: NEGATIVE
FLUBV RNA SPEC QL NAA+PROBE: NEGATIVE
GAS FLOW.O2 O2 DELIVERY SYS: ABNORMAL L/MIN
GAS FLOW.O2 SETTING OXYMISER: 12 BPM
GLUCOSE BLD STRIP.AUTO-MCNC: 216 MG/DL (ref 65–100)
GLUCOSE BLD STRIP.AUTO-MCNC: 231 MG/DL (ref 65–100)
GLUCOSE BLD STRIP.AUTO-MCNC: 235 MG/DL (ref 65–100)
GLUCOSE BLD STRIP.AUTO-MCNC: 277 MG/DL (ref 65–100)
GLUCOSE BLD STRIP.AUTO-MCNC: 296 MG/DL (ref 65–100)
GLUCOSE SERPL-MCNC: 193 MG/DL (ref 65–100)
HADV DNA SPEC QL NAA+PROBE: NEGATIVE
HCO3 BLD-SCNC: 34.3 MMOL/L (ref 22–26)
HCO3 BLD-SCNC: 35.3 MMOL/L (ref 22–26)
HCO3 BLD-SCNC: 35.9 MMOL/L (ref 22–26)
HCT VFR BLD AUTO: 29.3 % (ref 35.8–46.3)
HGB BLD-MCNC: 9.7 G/DL (ref 11.7–15.4)
HMPV RNA SPEC QL NAA+PROBE: NEGATIVE
HPIV1 RNA SPEC QL NAA+PROBE: NEGATIVE
HPIV2 RNA SPEC QL NAA+PROBE: NEGATIVE
HPIV3 RNA SPEC QL NAA+PROBE: NEGATIVE
MAGNESIUM SERPL-MCNC: 2.5 MG/DL (ref 1.8–2.4)
MCH RBC QN AUTO: 29.2 PG (ref 26.1–32.9)
MCHC RBC AUTO-ENTMCNC: 33.1 G/DL (ref 31.4–35)
MCV RBC AUTO: 88.3 FL (ref 79.6–97.8)
NRBC # BLD: 0 K/UL (ref 0–0.2)
O2/TOTAL GAS SETTING VFR VENT: 35 %
O2/TOTAL GAS SETTING VFR VENT: 35 %
O2/TOTAL GAS SETTING VFR VENT: 40 %
PCO2 BLD: 38.6 MMHG (ref 35–45)
PCO2 BLD: 39.1 MMHG (ref 35–45)
PCO2 BLD: 41.9 MMHG (ref 35–45)
PEEP RESPIRATORY: 8 CMH2O
PH BLD: 7.54 [PH] (ref 7.35–7.45)
PH BLD: 7.56 [PH] (ref 7.35–7.45)
PH BLD: 7.56 [PH] (ref 7.35–7.45)
PHOSPHATE SERPL-MCNC: 2.5 MG/DL (ref 2.3–3.7)
PLATELET # BLD AUTO: 299 K/UL (ref 150–450)
PMV BLD AUTO: 10.8 FL (ref 9.4–12.3)
PO2 BLD: 64 MMHG (ref 75–100)
PO2 BLD: 68 MMHG (ref 75–100)
PO2 BLD: 92 MMHG (ref 75–100)
POTASSIUM SERPL-SCNC: 3.7 MMOL/L (ref 3.5–5.1)
PRESSURE SUPPORT SETTING VENT: 0 CMH2O
PRESSURE SUPPORT SETTING VENT: 10 CMH2O
PRESSURE SUPPORT SETTING VENT: 3 CMH2O
PROCALCITONIN SERPL-MCNC: 4.2 NG/ML
RBC # BLD AUTO: 3.32 M/UL (ref 4.05–5.2)
RHINOVIRUS RNA SPEC QL NAA+PROBE: NEGATIVE
RSV A RNA SPEC QL NAA+PROBE: NEGATIVE
RSV B RNA SPEC QL NAA+PROBE: NEGATIVE
SAO2 % BLD: 94 % (ref 95–98)
SAO2 % BLD: 96 % (ref 95–98)
SAO2 % BLD: 98 % (ref 95–98)
SERVICE CMNT-IMP: ABNORMAL
SERVICE CMNT-IMP: NORMAL
SERVICE CMNT-IMP: NORMAL
SODIUM SERPL-SCNC: 142 MMOL/L (ref 136–145)
SPECIMEN SOURCE: NORMAL
SPECIMEN TYPE: ABNORMAL
TOTAL RESP. RATE, ITRR: 25
TOTAL RESP. RATE, ITRR: 25
VANCOMYCIN SERPL-MCNC: 24.6 UG/ML
VENTILATION MODE VENT: ABNORMAL
VT SETTING VENT: 450 ML
WBC # BLD AUTO: 22.1 K/UL (ref 4.3–11.1)

## 2020-10-07 PROCEDURE — 74011000250 HC RX REV CODE- 250: Performed by: INTERNAL MEDICINE

## 2020-10-07 PROCEDURE — 74011250636 HC RX REV CODE- 250/636: Performed by: THORACIC SURGERY (CARDIOTHORACIC VASCULAR SURGERY)

## 2020-10-07 PROCEDURE — 36592 COLLECT BLOOD FROM PICC: CPT

## 2020-10-07 PROCEDURE — 2709999900 HC NON-CHARGEABLE SUPPLY

## 2020-10-07 PROCEDURE — 82803 BLOOD GASES ANY COMBINATION: CPT

## 2020-10-07 PROCEDURE — 93970 EXTREMITY STUDY: CPT

## 2020-10-07 PROCEDURE — 74011000258 HC RX REV CODE- 258: Performed by: INTERNAL MEDICINE

## 2020-10-07 PROCEDURE — 99233 SBSQ HOSP IP/OBS HIGH 50: CPT | Performed by: INTERNAL MEDICINE

## 2020-10-07 PROCEDURE — 65610000006 HC RM INTENSIVE CARE

## 2020-10-07 PROCEDURE — 74011000258 HC RX REV CODE- 258: Performed by: THORACIC SURGERY (CARDIOTHORACIC VASCULAR SURGERY)

## 2020-10-07 PROCEDURE — 74011636637 HC RX REV CODE- 636/637: Performed by: THORACIC SURGERY (CARDIOTHORACIC VASCULAR SURGERY)

## 2020-10-07 PROCEDURE — 36600 WITHDRAWAL OF ARTERIAL BLOOD: CPT

## 2020-10-07 PROCEDURE — 80048 BASIC METABOLIC PNL TOTAL CA: CPT

## 2020-10-07 PROCEDURE — 74011250637 HC RX REV CODE- 250/637: Performed by: THORACIC SURGERY (CARDIOTHORACIC VASCULAR SURGERY)

## 2020-10-07 PROCEDURE — C9113 INJ PANTOPRAZOLE SODIUM, VIA: HCPCS | Performed by: INTERNAL MEDICINE

## 2020-10-07 PROCEDURE — 74011250637 HC RX REV CODE- 250/637: Performed by: PHYSICIAN ASSISTANT

## 2020-10-07 PROCEDURE — 74018 RADEX ABDOMEN 1 VIEW: CPT

## 2020-10-07 PROCEDURE — 74011000258 HC RX REV CODE- 258: Performed by: NURSE PRACTITIONER

## 2020-10-07 PROCEDURE — 83735 ASSAY OF MAGNESIUM: CPT

## 2020-10-07 PROCEDURE — 36415 COLL VENOUS BLD VENIPUNCTURE: CPT

## 2020-10-07 PROCEDURE — 74011250636 HC RX REV CODE- 250/636: Performed by: INTERNAL MEDICINE

## 2020-10-07 PROCEDURE — 74011636637 HC RX REV CODE- 636/637: Performed by: PHYSICIAN ASSISTANT

## 2020-10-07 PROCEDURE — 80202 ASSAY OF VANCOMYCIN: CPT

## 2020-10-07 PROCEDURE — 94003 VENT MGMT INPAT SUBQ DAY: CPT

## 2020-10-07 PROCEDURE — 87040 BLOOD CULTURE FOR BACTERIA: CPT

## 2020-10-07 PROCEDURE — 84100 ASSAY OF PHOSPHORUS: CPT

## 2020-10-07 PROCEDURE — 74011250636 HC RX REV CODE- 250/636: Performed by: NURSE PRACTITIONER

## 2020-10-07 PROCEDURE — 85027 COMPLETE CBC AUTOMATED: CPT

## 2020-10-07 PROCEDURE — 74011000250 HC RX REV CODE- 250: Performed by: THORACIC SURGERY (CARDIOTHORACIC VASCULAR SURGERY)

## 2020-10-07 PROCEDURE — 71045 X-RAY EXAM CHEST 1 VIEW: CPT

## 2020-10-07 PROCEDURE — 84145 PROCALCITONIN (PCT): CPT

## 2020-10-07 RX ORDER — HYDRALAZINE HYDROCHLORIDE 20 MG/ML
20 INJECTION INTRAMUSCULAR; INTRAVENOUS
Status: DISCONTINUED | OUTPATIENT
Start: 2020-10-07 | End: 2020-10-12 | Stop reason: HOSPADM

## 2020-10-07 RX ORDER — INSULIN GLARGINE 100 [IU]/ML
25 INJECTION, SOLUTION SUBCUTANEOUS
Status: DISCONTINUED | OUTPATIENT
Start: 2020-10-07 | End: 2020-10-09

## 2020-10-07 RX ORDER — NICARDIPINE HYDROCHLORIDE 0.1 MG/ML
5-15 INJECTION INTRAVENOUS
Status: DISCONTINUED | OUTPATIENT
Start: 2020-10-07 | End: 2020-10-08

## 2020-10-07 RX ADMIN — INSULIN HUMAN 6 UNITS: 100 INJECTION, SOLUTION PARENTERAL at 17:31

## 2020-10-07 RX ADMIN — FUROSEMIDE 10 MG/HR: 10 INJECTION, SOLUTION INTRAMUSCULAR; INTRAVENOUS at 04:36

## 2020-10-07 RX ADMIN — INSULIN HUMAN 6 UNITS: 100 INJECTION, SOLUTION PARENTERAL at 11:21

## 2020-10-07 RX ADMIN — ACETAMINOPHEN 650 MG: 325 TABLET, FILM COATED ORAL at 03:20

## 2020-10-07 RX ADMIN — NICARDIPINE HYDROCHLORIDE 5 MG/HR: 0.1 INJECTION, SOLUTION INTRAVENOUS at 13:19

## 2020-10-07 RX ADMIN — Medication 10 ML: at 20:29

## 2020-10-07 RX ADMIN — NICARDIPINE HYDROCHLORIDE 10 MG/HR: 0.1 INJECTION, SOLUTION INTRAVENOUS at 09:02

## 2020-10-07 RX ADMIN — ROSUVASTATIN CALCIUM 40 MG: 20 TABLET, COATED ORAL at 20:25

## 2020-10-07 RX ADMIN — PIPERACILLIN SODIUM AND TAZOBACTAM SODIUM 3.38 G: 3; .375 INJECTION, POWDER, LYOPHILIZED, FOR SOLUTION INTRAVENOUS at 06:29

## 2020-10-07 RX ADMIN — ASPIRIN 81 MG: 81 TABLET, CHEWABLE ORAL at 09:24

## 2020-10-07 RX ADMIN — Medication 10 ML: at 13:11

## 2020-10-07 RX ADMIN — Medication 1 AMPULE: at 09:24

## 2020-10-07 RX ADMIN — CEFEPIME 1 G: 1 INJECTION, POWDER, FOR SOLUTION INTRAMUSCULAR; INTRAVENOUS at 15:34

## 2020-10-07 RX ADMIN — Medication 30 ML: at 20:28

## 2020-10-07 RX ADMIN — Medication 30 ML: at 13:11

## 2020-10-07 RX ADMIN — INSULIN HUMAN 4 UNITS: 100 INJECTION, SOLUTION PARENTERAL at 22:44

## 2020-10-07 RX ADMIN — NICARDIPINE HYDROCHLORIDE 10 MG/HR: 0.1 INJECTION, SOLUTION INTRAVENOUS at 17:30

## 2020-10-07 RX ADMIN — Medication 10 ML: at 05:54

## 2020-10-07 RX ADMIN — DEXMEDETOMIDINE 0.4 MCG/KG/HR: 100 INJECTION, SOLUTION, CONCENTRATE INTRAVENOUS at 17:30

## 2020-10-07 RX ADMIN — ACETAMINOPHEN 650 MG: 325 TABLET, FILM COATED ORAL at 13:16

## 2020-10-07 RX ADMIN — PANTOPRAZOLE SODIUM 40 MG: 40 INJECTION, POWDER, FOR SOLUTION INTRAVENOUS at 09:24

## 2020-10-07 RX ADMIN — Medication 1 AMPULE: at 20:25

## 2020-10-07 RX ADMIN — AMIODARONE HYDROCHLORIDE 200 MG: 200 TABLET ORAL at 20:25

## 2020-10-07 RX ADMIN — INSULIN HUMAN 4 UNITS: 100 INJECTION, SOLUTION PARENTERAL at 06:48

## 2020-10-07 RX ADMIN — Medication 30 ML: at 05:53

## 2020-10-07 RX ADMIN — INSULIN GLARGINE 25 UNITS: 100 INJECTION, SOLUTION SUBCUTANEOUS at 22:44

## 2020-10-07 RX ADMIN — METOPROLOL TARTRATE 25 MG: 25 TABLET, FILM COATED ORAL at 09:24

## 2020-10-07 NOTE — PROGRESS NOTES
Ventilator check complete; patient has a #7.5 ET tube secured at the 24 at the lip. Patient is not sedated. Patient is not able to follow commands. Breath sounds are coarse and diminished. Trachea is midline, Negative for subcutaneous air, and chest excursion is symmetric. Patient is also Negative for cyanosis and is Negative for pitting edema. All alarms are set and audible. Resuscitation bag is at the head of the bed. Ventilator Settings  Mode FIO2 Rate Pressure PEEP   Pressure support  30 %    8 cm H2O  8 cm H20      Peak airway pressure: 17.1 cm H2O   Minute ventilation: 10.6 l/min     Tube reyna for ETT changed at 0818 hours.      Golden Moreno, RT

## 2020-10-07 NOTE — ANESTHESIA PREPROCEDURE EVALUATION
Relevant Problems   No relevant active problems       Anesthetic History   No history of anesthetic complications            Review of Systems / Medical History  Patient summary reviewed, nursing notes reviewed and pertinent labs reviewed    Pulmonary        Sleep apnea: CPAP        Comments: resp failure on vent   Neuro/Psych   Within defined limits           Cardiovascular    Hypertension: well controlled        Dysrhythmias (PAF) : atrial fibrillation  CAD (Multivessel obstructive CAD w preserved LV function), CABG and hyperlipidemia    Exercise tolerance: <4 METS  Comments: Patient is s/p cabg with post of resp failure and confusion   GI/Hepatic/Renal  Within defined limits              Endo/Other  Within defined limits           Other Findings              Physical Exam    Airway  Mallampati: I  TM Distance: 4 - 6 cm  Neck ROM: normal range of motion   Mouth opening: Normal  Intubated   Cardiovascular  Regular rate and rhythm,  S1 and S2 normal,  no murmur, click, rub, or gallop             Dental  No notable dental hx       Pulmonary                 Abdominal  GI exam deferred       Other Findings            Anesthetic Plan    ASA: 4  Anesthesia type: general        Post procedure ventilation   Induction: Intravenous and Inhalational  Anesthetic plan and risks discussed with: Patient      Attempted to call daughter and was unable to make a connection.

## 2020-10-07 NOTE — PROGRESS NOTES
10/07/20 1657   Patient Observations   Pulse (Heart Rate) 71   Resp Rate 28   O2 Sat (%) 93 %   Airway - Endotracheal Tube 10/02/20 Oral   Placement Date/Time: 10/02/20 0920   Number of Attempts: 1  Inserted By: Dr. Jordan Welch  Present on Admission/Arrival: No  Location: Oral  Placement Verified: Auscultation;BBS;EtCO2  Airway Types: Endotracheal, cuffed  Airway Tube Size: 7.5 mm  Anesthesia . .. Insertion Depth (cm) 23 cm   Line Ammon Lips   Side Secured Taped;Right   Site Assessment Clean, dry, & intact   Respiratory   Respiratory (WDL) X   Patient on Vent Yes - If patient is on vent, add Doc Flowsheet Ventilator (). Respiratory Pattern Regular   Chest/Tracheal Assessment Chest expansion, symmetrical   Breath Sounds Bilateral Coarse;Diminished   Cough Productive;Cough with suction   Airway Clearance   Suction Oral;ET Tube   Suction Device Inline suction catheter;Edwardkauer   Sputum Method Obtained Oral   Sputum Amount Moderate  (In line suction scant production of white thick secretions.)   Sputum Color/Odor White   Sputum Consistency Thin   Vent Settings   FIO2 (%) 35 %   SpO2/FIO2 Ratio 265.71   Back-Up Rate 15   Pressure Support (cm H2O) 0 cm H2O   PEEP/VENT (cm H2O) 8 cm H20   Insp Rise Time (sec) 0.15   Flow Trigger 1   Ventilator Measurements   Resp Rate Observed 28   Vt Exhaled (Machine Breath) (ml) 438 ml   Ve Observed (l/min) 9.3 l/min   PIP Observed (cm H2O) 11.5 cm H2O   MAP (cm H2O) 9   Dynamic Compliance (ml/cm H20) 117.5 ml/cm H20   Safety & Alarms   Backup Mode Checked/Apnea Yes   Pressure Max 50 cm H2O   Pressure Min 2 cm H2O   Ve Min 2   Ve Max 22   RR Min 5   RR Max 50   Ambu Bag Yes   Ambu Mask Yes   Vent Method/Mode   Ventilation Method Conventional   Ventilator Mode Pressure support   Ventilator Mode ID 8   Pulmonary Toilet   Pulmonary Toilet H. O.B elevated   Patient's ETT reyna was falling down patient's face and pulling ETT out of position. ETT taped instead at the 23 cm ammon.

## 2020-10-07 NOTE — PROGRESS NOTES
Ventilator check complete; patient has a #7.5 ET tube secured at the 23 at the teeth. Patient is not sedated. Patient is able to follow commands. Breath sounds are coarse and diminished. Trachea is midline, Negative for subcutaneous air, and chest excursion is symmetric. Patient is also Negative for cyanosis and is Negative for pitting edema. All alarms are set and audible. Resuscitation bag is at the head of the bed.       Ventilator Settings  Mode FIO2 Rate Tidal Volume Pressure PEEP I:E Ratio   SIMV, Pressure support, PRVC  40 %   12 450 ml  5 cm H2O  8 cm H20  1:2      Peak airway pressure: 14.6 cm H2O   Minute ventilation: 11.3 l/min       Gela Mendez RT

## 2020-10-07 NOTE — PROGRESS NOTES
Today's Date: 10/7/2020  Date of Admission: 9/29/2020    Chart Reviewed. Subjective:     Patient remains on vent, still febrile. Medications Reviewed. Objective:     Vitals:    10/07/20 0958 10/07/20 1014 10/07/20 1030 10/07/20 1044   BP: 133/63 (!) 113/51 134/63 (!) 144/64   Pulse: 62 (!) 57 64 65   Resp: 26 (!) 44 29 22   Temp:       SpO2: 93% 93% 93% 93%   Weight:       Height:           Intake and Output  Current Shift: 10/07 0701 - 10/07 1900  In: -   Out: 700 [Urine:700]   Last 3 Shifts: 10/05 1901 - 10/07 0700  In: 5648.7 [I.V.:3081.7]  Out: 56672 [Urine:41296]    Physical Exam:  General: Well Developed, Obese, on vent  Neck: supple, no JVD  Heart: S1S2 with RRR without murmurs or gallops  Lungs: decreased bilaterally  Abd: soft, + bowel sounds  Ext: + edema bilaterally  Skin: warm and dry    LABS  Data Review:   Recent Labs     10/07/20  0302 10/06/20  0329    143   K 3.7 3.3*   MG 2.5* 2.1   BUN 44* 38*   CREA 2.70* 2.05*   * 219*   WBC 22.1* 17.0*   HGB 9.7* 8.8*   HCT 29.3* 26.8*    248       Estimated Creatinine Clearance: 22.2 mL/min (A) (based on SCr of 2.7 mg/dL (H)).       Assessment/Plan:     Principal Problem:    S/P CABG x 4 (9/29/2020)  Remains on vent after re-intubation 10/2, still febrile, cultures neg to date, on IV antibiotics, ID consulted     Active Problems:    Paroxysmal atrial fibrillation (HCC) (9/18/2020)  In sinus rhythm currently        Paroxysmal A-fib ([facility] [address] (9/29/2020)    S/p MAZE, in sinus currently        Atherosclerosis of native coronary artery of native heart with unstable angina pectoris ([facility] [address] (9/29/2020)          CAD (coronary artery disease) (9/29/2020)  S/p CABG        AMOR on CPAP (9/30/2020)       Pulmonary edema (10/1/2020)  Improved      Anxiety (10/1/2020)          Acute respiratory failure with hypoxia ([address] (10/2/2020)  Remains on vent       Bilateral pulmonary infiltrates on chest x-ray (10/2/2020)  Chest xray improved, lasix drip given worsening renal function, large volume of diuresis last few days       Hypotension (10/3/2020)  Resolved        YURI (acute kidney injury) ([facility] [address] (10/5/2020)    renal function worse, nephrology following      DM  Was not on any meds at home, Hgb A1C 8.2 pre-op, increase Lantus and continue to titrate as needed           [place] JEFFREY Grijalva

## 2020-10-07 NOTE — ROUTINE PROCESS
Respiratory Care Services Policy Number: -[id number] Title: CVRU Ventilator Weaning Protocol Effective Date: 10/2000 Revised Date:  05/2009, 07/2019 Reviewed Date: 06/2013,  05/2014, 07/2015, 03/2016, 06/2017, 05/2018 I. Policy:Upon physician's initial order, this protocol will be implemented for those patients who meet the criteria for weaning from mechanical ventilation. II. Purpose: The respiratory care practitioner will utilize the following protocol to provide the most efficient and effective ventilator weaning and extubation. III. Responsibility: Director, Edith Baker, and all Respiratory Care Practitioners with documented competency. IV. Procedure: Management of Ventilator, Extubation, and Post-Extubation Process Refer to the [facility] Cardiac Surgery Protocol Policy Number:  [id number] [email or web address]/stfr[facility] Centeris/policies/Critical%20Care%[id number]. doc V. Documentation A. Document findings in the respiratory section of the patient's EMR. 1. Document when ventilator is discontinued. 2. Document when airway is removed. B.  Document the following in the Progress Notes: 1. mechanics 2. extubation 3. patient tolerance of extubation 4. respiratory delivery device placed on patient C. Document changes in therapy per protocol in the respiratory orders section and in the         care plan section of the patient's EMR. D. Document patient education in the patient education section of the patient's EMR. References: L -  Therapist Driven Respiratory Care Protocols  A Practitioner's Guide for Criteria-Based Respiratory Care by Agnes Jimenes M.D., and SABI Wild RRT. N  [facility] Clinical Practice Guidelines, Evidenced Based Guidelines for Weaning and Discontinuing Ventilatory Support. Melinda Bennett

## 2020-10-07 NOTE — PROGRESS NOTES
Bedside and Verbal shift change report received from Brock Delgado, [facility].

## 2020-10-07 NOTE — DIABETES MGMT
Patient s/p CABG x4. Patient has past medical history of Afib, HTN, HLD, smoker, and uterine cancer. Patient intubated, febrile, requiring TF. Nephrology following for YURI. Spoke with provider about increasing Lantus as tube feeding appears to be driving insulin needs up. Blood glucose ranged 216-282 yesterday with patient receiving Lantus 15 units and regular 18 units. Blood glucose 235 this morning and 277 at lunch. Lantus to increase to 25 units tonight. Consult for new onset diabetes acknowledged will follow along and assess needs and provide education as patient condition allows.

## 2020-10-07 NOTE — PROGRESS NOTES
Pharmacokinetic Consult to Pharmacist    Bertin Matti is a 56 y.o. female being treated for sepsis with vancomycin and zosyn. Patient was started on vancomycin and zosyn on 10/3. Vancomycin was stopped on 10/5, but restarted today due to continued fevers. Height: 5' 3\" (160 cm)  Weight: 83.9 kg (184 lb 15.5 oz)  Lab Results   Component Value Date/Time    BUN 44 (H) 10/07/2020 03:02 AM    Creatinine 2.70 (H) 10/07/2020 03:02 AM    WBC 22.1 (H) 10/07/2020 03:02 AM    Procalcitonin 4.20 10/07/2020 06:39 AM      Estimated Creatinine Clearance: 22.2 mL/min (A) (based on SCr of 2.7 mg/dL (H)). CULTURES:  10/2 Blood cx: NG  10/2 Urine cx: NG  10/2 Sputum cx; NG    Lab Results   Component Value Date/Time    Vancomycin, random 24.6 10/07/2020 03:02 AM       Day 2 of vancomycin (restarted), day 5 overall. Goal trough is 15-20 mcg/ml. Random level this AM supratherapeutic at 24.6. Will not redose vancomcyin today. Will continue to follow patient.       Thank you,  Francisca Moreno, PharmD, [address]  Clinical Pharmacy Specialist  [phone]

## 2020-10-07 NOTE — CONSULTS
Infectious Disease Consult    Today's Date: 10/7/2020   Admit Date: 9/29/2020    Impression:   · Fevers and leukocytosis  · Acute respiratory failure, s/p emergent intubation and bronch 10/2, culture negative; viral, fungal, atypical pending  · YURI  · CABG x 4 9/29    Plan:   · Will discontinue zosyn and start cefepime. Vanc is on hold per pharmacy. · Will check all extremities for DVT   · Blood cultures x 2 sets today    Anti-infectives:   · Cefazolin (9/29 -9/30)  · Ceftriaxone (10/2-10/3)  · Zosyn (10/3 -   · vancomycin (10/3-10/5, 10/7-     Subjective:   Date of Consultation:  October 7, 2020  Referring Physician: Abdoulaye Foley    Patient is a 56 y.o. female with an underlying medical history that includes hyperlipidemia, hypertension, and endometrial cancer, s/p surgery and radiation about eight years ago. She lives in [place] but was here in [facility] staying with her mother. She was hospitalized 9/18 - 9/20 with angina, and diagnosed with atrial fib with RVR. She required heart cath that showed multivessel CAD. She was then readmitted 9/29/2020 for four-vessel CABG. Following surgery she spiked a fever 101, developed leukocytosis, and required emergent intubation 10/2 due to acute respiratory failure. She had bronch with multiple cultures pending. She has continued to spike fevers despite antibiotics. Initial blood, urine and respiratory cultures 10/2 NG. Procalcitonin 6.14, trending now, now 4.2. Covid testing 9/28 was negative. She has a PICC that was placed 10/5. Renal function has worsened over the past few days, GFR now 19. She has been treated with antibiotics as above. ID has been asked for further recommendations. Her daughter is bedside.      Patient Active Problem List   Diagnosis Code    Paroxysmal atrial fibrillation (HCC) I48.0    Essential hypertension I10    Mixed hyperlipidemia E78.2    Uterine cancer ([facility] C55    Chest pain R07.9    Atrial fibrillation (HCC) I48.91    Paroxysmal A-fib ([facility]) I48.0    Atherosclerosis of native coronary artery of native heart with unstable angina pectoris ([facility]) I25.110    S/P CABG x 4 Z95.1    Suspected sleep apnea R29.818    CAD (coronary artery disease) I25.10    AMOR on CPAP G47.33, Z99.89    Pulmonary edema J81.1    Anxiety F41.9    Acute respiratory failure with hypoxia ([facility]) J96.01    Bilateral pulmonary infiltrates on chest x-ray R91.8    Hypotension I95.9    YURI (acute kidney injury) ([facility] N17.9     Past Medical History:   Diagnosis Date    Anxiety     Borderline diabetes     Endometrial cancer ([facility]     Essential hypertension 9/18/2020    Heart failure ([facility])     EF 55-60% ECHO 9/18/20    History of anemia     Mixed hyperlipidemia 9/18/2020    [facility] 9/18/2020 9/18/20- [facility] at [facility]  appt 9/28/20    Sleep apnea     uses CPAP QHS      Family History   Problem Relation Age of Onset    Coronary Artery Disease Mother     Cancer Mother     Hypertension Mother     Coronary Artery Disease Father     Hypertension Father     Coronary Artery Disease Brother     Hypertension Brother     Lung Disease Brother       Social History     Tobacco Use    Smoking status: Current Every Day Smoker     Packs/day: 0.50    Smokeless tobacco: Never Used   Substance Use Topics    Alcohol use: Not Currently     Past Surgical History:   Procedure Laterality Date    HX CYST REMOVAL      from scalp as kid    HX CYST REMOVAL      HX HEART CATHETERIZATION  09/18/2020    HX HYSTERECTOMY      HX ORTHOPAEDIC      left elbow surgery      Prior to Admission medications    Medication Sig Start Date End Date Taking? Authorizing Provider   ZINC PO Take  by mouth. Hold for procedure. Yes Provider, Historical   lisinopriL (PRINIVIL, ZESTRIL) 20 mg tablet Take 1 Tab by mouth daily. Patient taking differently: Take 20 mg by mouth daily. Do not take morning of procedure.  9/20/20  Yes Kaur Oliveros NP   rosuvastatin ([facility]) 40 mg tablet Take 1 Tab by mouth nightly. 20  Yes Perley Gaucher D, NP   amiodarone (CORDARONE) 200 mg tablet Take 1 Tab by mouth two (2) times a day. Patient taking differently: Take 200 mg by mouth two (2) times a day. Take morning of procedure. 20  Yes Perley Gaucher D, NP   aspirin 81 mg chewable tablet Take 81 mg by mouth daily. Take morning of procedure. 20  Yes Perley Gaucher D, NP   metoprolol tartrate (LOPRESSOR) 25 mg tablet Take 1 Tab by mouth two (2) times a day. Patient taking differently: Take 25 mg by mouth two (2) times a day. Take morning of procedure. Per Beau Francisco, [address] reduce dose to 12.5 mg 20  Yes Perley Gaucher D, NP   nitroglycerin (NITROSTAT) 0.4 mg SL tablet 1 Tab by SubLINGual route every five (5) minutes as needed for Chest Pain. Up to 3 doses. Patient taking differently: 0.4 mg by SubLINGual route every five (5) minutes as needed for Chest Pain. Up to 3 doses. Bring nitroglycerin with you morning of procedure. 20   Perley Gaucher D, NP   rivaroxaban (XARELTO) 20 mg tab tablet Take 1 Tab by mouth daily (with breakfast). Patient taking differently: Take 20 mg by mouth daily (with breakfast). Last dose 20   Perley Gaucher D, NP   furosemide (Lasix) 40 mg tablet Take 40 mg by mouth daily as needed. Do not take morning of procedure. Provider, Historical       Allergies   Allergen Reactions    Influenza Virus Vaccines Other (comments)     Gives her the flu. Review of Systems:  Pertinent items are noted in the History of Present Illness.     Objective:     Visit Vitals  BP (!) 158/70   Pulse 78   Temp 100.3 °F (37.9 °C)   Resp (!) 35   Ht 5' 3\" (1.6 m)   Wt 83.9 kg (184 lb 15.5 oz)   SpO2 93%   BMI 32.77 kg/m²     Temp (24hrs), Av.7 °F (38.2 °C), Min:99.6 °F (37.6 °C), Max:101.8 °F (38.8 °C)       Lines:  Peripheral IV:       Physical Exam:    General:  Intubated on vent, appears ill, appears stated age   Eyes:  No drainage   Mouth/Throat: Moist, oral intubation   Neck: Supple   Lungs:   Ivent, coarse, diminished   CV:  Regular rate and rhythm, no audible murmur   Abdomen:   Soft, non-tender. bowel sounds normal. non-distended, will catheter with yellow urine   Extremities: No cyanosis or edema   Skin: No acute rash; sternal wound vac in place   Musculoskeletal: No swelling or deformity   Lines/Devices:  Intact, no erythema, drainage or tenderness, RUE PICC, placed 10/5   Psych: Alert and oriented, appropriate mood and affect given the setting       Data Review:     CBC:  Recent Labs     10/07/20  0302 10/06/20  0329 10/05/20  0309   WBC 22.1* 17.0* 13.0*   HGB 9.7* 8.8* 9.3*   HCT 29.3* 26.8* 28.8*    248 234       BMP:  Recent Labs     10/07/20  0302 10/06/20  0329 10/05/20  0309   CREA 2.70* 2.05* 1.96*   BUN 44* 38* 38*    143 140   K 3.7 3.3* 3.5    107 105   CO2 36* 30 31   AGAP 4* 6* 4*   * 219* 155*       LFTS:  No results for input(s): TBILI, ALT, AP, TP, ALB in the last 72 hours.     No lab exists for component: SGOT    Microbiology:     All Micro Results     Procedure Component Value Units Date/Time    CULTURE, BLOOD [[id number]] Collected:  10/02/20 0734    Order Status:  Completed Specimen:  Blood Updated:  10/07/20 0834     Special Requests: --        LEFT  HAND       Culture result: NO GROWTH 5 DAYS       CULTURE, BLOOD [[id number]] Collected:  10/02/20 0728    Order Status:  Completed Specimen:  Blood Updated:  10/07/20 0834     Special Requests: --        RIGHT  HAND       Culture result: NO GROWTH 5 DAYS       CULTURE, RESPIRATORY/SPUTUM/BRONCH Montiel Leader STAIN [[id number]] Collected:  10/06/20 1500    Order Status:  Completed Specimen:  Sputum from Endotracheal aspirate Updated:  10/07/20 0744     Special Requests: NO SPECIAL REQUESTS        GRAM STAIN PENDING     Culture result: NO GROWTH 1 DAY       FUNGUS CULTURE AND SMEAR - Sukhjinder Harvey [[id number]] Collected:  10/02/20 1245    Order Status:  Completed Specimen:  Other from Miscellaneous sample Updated:  10/06/20 1735     Source BRONCHIAL LAVAGE        Fungus stain Direct Inoculation     Fungus (Mycology) Culture Other source received     Comment: (NOTE)  Performed At: [address] [id number]  Ga Cote MD ZR:[id number]         CULTURE, RESPIRATORY/SPUTUM/BRONCH Tashi Verdugo [[id number]] Collected:  10/02/20 1245    Order Status:  Completed Specimen:  Sputum from Bronchial lavage Updated:  10/04/20 1015     Special Requests: NO SPECIAL REQUESTS        GRAM STAIN 0 TO 15 WBC'S SEEN PER OIF      NO EPITHELIAL CELLS SEEN         NO DEFINITE ORGANISM SEEN        Culture result: NO GROWTH 2 DAYS       CULTURE, URINE [[id number]] Collected:  10/02/20 0900    Order Status:  Completed Specimen:  Cath Urine Updated:  10/04/20 0714     Special Requests: NO SPECIAL REQUESTS        Culture result: NO GROWTH 2 DAYS       AFB (MYCOBACTERIUM) CULTURE & SMEAR W/REFLEX ID Mag Peters NOCARDIA [[id number]] Collected:  10/02/20 1245    Order Status:  Completed Specimen:  Miscellaneous sample Updated:  10/03/20 1735     Source BRONCHIAL LAVAGE        AFB Specimen processing Concentration     Acid Fast Smear Negative        Comment: (NOTE)  Performed At: [address] [id number]  Ga Cote MD E          Acid Fast Culture PENDING    RESPIRATORY VIRAL PANEL, PCR [[id number]] Collected:  10/02/20 1245    Order Status:  Completed Specimen:  Sputum Updated:  10/02/20 1456    ATYPICAL PNEUMONIA BY PCR,BRONCHOALVEOLAR LAVAGE - INCLUDES: MYCOPLASMA PNEUMONIAE AND Mere Gomez [[id number]] Collected:  10/02/20 1245    Order Status:  Completed Updated:  10/02/20 145    L.  PNEUMOPHILA BY Mary Anne Spence [[id number]] Collected:  10/02/20 1245    Order Status:  Completed Updated:  10/02/20 1456          Imaging:     CXR 10/7/2020  IMPRESSION: Improving pulmonary edema and pleural effusions.   Signed By: Rosalie Srivastava NP     October 7, 2020

## 2020-10-07 NOTE — PROGRESS NOTES
Critical Care Daily Progress Note: 10/7/2020  Admission Date: 9/29/2020     The patient's chart is reviewed and the patient is discussed with the staff. 57 y/o female Patient had CABG x 4 on 9/29. Currently is sedated in CV-ICU and orally intubated receiving  mechanical ventilation. Pt presented to the ER at [facility] on 9/18/2020 with chest pain and dyspnea. She was found to be in afib RVR. She was started on cardizem and admitted by cardiology. She underwent LHC by Dr. Minoo West which revealed MVCAD.  She was extubated on day of surgery but reintubated 10/2  Subjective:   Still on vent  Agitated but not awake and following commands  On Lasix gtt   Also on low dose precedex gtt   continue to have fevers- 101.8 this AM, WBC up        Current Facility-Administered Medications   Medication Dose Route Frequency    dexmedeTOMidine (PRECEDEX) 400 mcg in 0.9% sodium chloride 100 mL infusion  0.2-0.7 mcg/kg/hr IntraVENous TITRATE    Vancomycin Intermittent Dosing   Other Rx Dosing/Monitoring    insulin glargine (LANTUS) injection 15 Units  15 Units SubCUTAneous QHS    furosemide (LASIX) 100 mg in 0.9% sodium chloride (MBP/ADV) 100 mL infusion  10 mg/hr IntraVENous CONTINUOUS    sodium chloride (NS) flush 30 mL  30 mL InterCATHeter Q8H    sodium chloride (NS) flush 30 mL  30 mL InterCATHeter PRN    niCARdipine in Saline (CARDENE) 25 MG/250 mL infusion kit  5-15 mg/hr IntraVENous TITRATE    piperacillin-tazobactam (ZOSYN) 3.375 g in 0.9% sodium chloride (MBP/ADV) 100 mL  3.375 g IntraVENous Q8H    LORazepam (ATIVAN) tablet 1 mg  1 mg Oral Q4H PRN    pantoprazole (PROTONIX) 40 mg in 0.9% sodium chloride 10 mL injection  40 mg IntraVENous Q24H    fentaNYL in normal saline (pf) 25 mcg/mL infusion  0-200 mcg/hr IntraVENous TITRATE    NOREPINephrine (LEVOPHED) 4 mg in 5% dextrose 250 mL infusion  0.5-16 mcg/min IntraVENous TITRATE    midazolam in normal saline (VERSED) 1 mg/mL infusion  0-10 mg/hr IntraVENous TITRATE    sodium chloride (NS) flush 5-40 mL  5-40 mL IntraVENous Q8H    sodium chloride (NS) flush 5-40 mL  5-40 mL IntraVENous PRN    midazolam (VERSED) injection 2 mg  2 mg IntraVENous Multiple    NUTRITIONAL SUPPORT ELECTROLYTE PRN ORDERS   Does Not Apply PRN    senna-docusate (PERICOLACE) 8.6-50 mg per tablet 2 Tab  2 Tab Oral BID    LORazepam (ATIVAN) injection 1 mg  1 mg IntraVENous Q4H PRN    traMADoL (ULTRAM) tablet 100 mg  100 mg Oral Q6H PRN    oxyCODONE-acetaminophen (PERCOCET) 5-325 mg per tablet 1 Tab  1 Tab Oral Q4H PRN    insulin regular (NOVOLIN R, HUMULIN R) injection   SubCUTAneous AC&HS    alcohol 62% (NOZIN) nasal  1 Ampule  1 Ampule Topical Q12H    rosuvastatin (CRESTOR) tablet 40 mg  40 mg Oral QHS    dextrose 5% - 0.45% NaCl with KCl 20 mEq/L infusion  25 mL/hr IntraVENous CONTINUOUS    sodium chloride (NS) flush 5-40 mL  5-40 mL IntraVENous Q8H    sodium chloride (NS) flush 5-40 mL  5-40 mL IntraVENous PRN    naloxone (NARCAN) injection 0.4 mg  0.4 mg IntraVENous PRN    sodium bicarbonate (8.4%) injection 50 mEq  50 mEq IntraVENous PRN    nitroglycerin (Tridil) 200 mcg/ml infusion   mcg/min IntraVENous TITRATE    amiodarone (CORDARONE) tablet 200 mg  200 mg Oral Q12H    metoprolol tartrate (LOPRESSOR) tablet 25 mg  25 mg Oral Q12H    ondansetron (ZOFRAN) injection 4 mg  4 mg IntraVENous Q4H PRN    dextrose (D50W) injection syrg 12.5 g  25 mL IntraVENous PRN    aspirin chewable tablet 81 mg  81 mg Oral DAILY    magnesium sulfate 1 g/100 ml IVPB (premix or compounded)  1 g IntraVENous PRN    EPINEPHrine (ADRENALIN) 4 mg in 0.9% sodium chloride 250 mL infusion (PRE-MIX)  0.01-0.05 mcg/kg/min IntraVENous TITRATE    acetaminophen (TYLENOL) tablet 650 mg  650 mg Oral Q4H PRN       Review of Systems   Unobtainable due to patient status -- sedative on Vent.            Objective:     Vitals:    10/07/20 4935 10/07/20 0515 10/07/20 2423 10/07/20 5591 BP:  139/72 (!) 114/58 (!) 151/65   Pulse: 67 66 (!) 58 66   Resp: 24 17 20 22   Temp:       SpO2: 95% 95% 95% 95%   Weight:       Height:             Intake/Output Summary (Last 24 hours) at 10/7/2020 0601  Last data filed at 10/7/2020 0552  Gross per 24 hour   Intake 5021.49 ml   Output 4675 ml   Net 346.49 ml         Physical Exam:          Constitutional:  intubated and mechanically ventilated. EENMT:  Sclera clear, pupils equal, oral mucosa moist  Respiratory:  crackles  Cardiovascular:  RRR with no M,G,R;  Gastrointestinal:  soft with no tenderness; positive bowel sounds present  Musculoskeletal:  warm with no cyanosis, no lower extremity edema  Skin:  no jaundice or ecchymosis  Neurologic: no gross neuro deficits     Psychiatric:  Wakes up some    LINES:    ETT, central    DRIPS:      Lasix gtt  Precedex gtt    CXR:       Yesterday   Improved infiltrates today          Ventilator Settings  Mode FIO2 Rate Tidal Volume Pressure PEEP   Pressure support  30 %    450 ml  8 cm H2O  8 cm H20      Peak airway pressure: 17.3 cm H2O   Minute ventilation: 9.2 l/min     ABG:   Recent Labs     10/07/20  0355 10/06/20  0311 10/05/20  0351   PHI 7.56* 7.48* 7.41   PCO2I 39.1 39.7 43.9   PO2I 92 67* 109*   HCO3I 35.3* 29.6* 27.9*       LAB  Recent Labs     10/06/20  2115 10/06/20  1718 10/06/20  1224 10/06/20  0609 10/05/20  2213   GLUCPOC 216* 228* 241* 282* 211*     Recent Labs     10/07/20  0302 10/06/20  0329 10/05/20  0309   WBC 22.1* 17.0* 13.0*   HGB 9.7* 8.8* 9.3*   HCT 29.3* 26.8* 28.8*    248 234     Recent Labs     10/07/20  0302 10/06/20  0329 10/05/20  0309    143 140   K 3.7 3.3* 3.5    107 105   CO2 36* 30 31   * 219* 155*   BUN 44* 38* 38*   CREA 2.70* 2.05* 1.96*   MG 2.5* 2.1 2.8*   PHOS 2.5  --  2.0*   CA 8.7 7.2* 7.8*     No results for input(s): LCAD, LAC in the last 72 hours.       Patient Active Problem List   Diagnosis Code    Paroxysmal atrial fibrillation (HCC) I48.0    Essential hypertension I10    Mixed hyperlipidemia E78.2    Uterine cancer ([facility] C55    Chest pain R07.9    Atrial fibrillation (HCC) I48.91    Paroxysmal A-fib (HCC) I48.0    Atherosclerosis of native coronary artery of native heart with unstable angina pectoris (HCC) I25.110    S/P CABG x 4 Z95.1    Suspected sleep apnea R29.818    CAD (coronary artery disease) I25.10    AMOR on CPAP G47.33, Z99.89    Pulmonary edema J81.1    Anxiety F41.9    Acute respiratory failure with hypoxia (HCC) J96.01    Bilateral pulmonary infiltrates on chest x-ray R91.8    Hypotension I95.9    [facility] N17.9         Assessment:  (Medical Decision Making)     Hospital Problems  Date Reviewed: 9/22/2020          Codes Class Noted POA    YURI (acute kidney injury) ([facility] ICD-10-CM: N17.9  ICD-9-CM: 584.9  10/5/2020 Unknown         Hypotension ICD-10-CM: I95.9  ICD-9-CM: 458.9  10/3/2020 Unknown        Acute respiratory failure with hypoxia ([facility] ICD-10-CM: J96.01  ICD-9-CM: 518.81  10/2/2020 Unknown        Bilateral pulmonary infiltrates on chest x-ray ICD-10-CM: R91.8  ICD-9-CM: 793.19  10/2/2020 Unknown    Improved-increased procal but cultures negative    Pulmonary edema ICD-10-CM: J81.1  ICD-9-CM: 514  10/1/2020 Unknown        Anxiety ICD-10-CM: F41.9  ICD-9-CM: 300.00  10/1/2020 Unknown        AMOR on CPAP ICD-10-CM: G47.33, Z99.89  ICD-9-CM: 327.23, V46.8  9/30/2020 Unknown        Paroxysmal A-fib (HCC) ICD-10-CM: I48.0  ICD-9-CM: 427.31  9/29/2020 Unknown        Atherosclerosis of native coronary artery of native heart with unstable angina pectoris (HCC) ICD-10-CM: I25.110  ICD-9-CM: 414.01, 411.1  9/29/2020 Unknown        * (Principal) S/P CABG x 4 ICD-10-CM: Z95.1  ICD-9-CM: V45.81  9/29/2020 Unknown        CAD (coronary artery disease) ICD-10-CM: I25.10  ICD-9-CM: 414.00  9/29/2020 Unknown        Paroxysmal atrial fibrillation (HCC) ICD-10-CM: I48.0  ICD-9-CM: 427.31  9/18/2020 Unknown Plan:  (Medical Decision Making)   - continue vent support, not awake and following command for extubation  - Zosyn D 5, also on Vanco, continue to have fever, WBC rising, cultures negative , had bronch ,will get ID to see her   - continue TF  - will get ID to see       More than 50% of the time documented was spent in face-to-face contact with the patient and in the care of the patient on the floor/unit where the patient is located.     Denis Ivey MD

## 2020-10-07 NOTE — PROGRESS NOTES
[facility]    Follow-Up on: YURI on CKD    HPI: Pt intubated and agitated.  Seen An examined    ROS:  UTO    Current Facility-Administered Medications   Medication Dose Route Frequency    niCARdipine in Saline (CARDENE) 0.1mg/mL 200 ml premix infusion  5-15 mg/hr IntraVENous TITRATE    insulin glargine (LANTUS) injection 25 Units  25 Units SubCUTAneous QHS    cefepime (MAXIPIME) 1 g in 0.9% sodium chloride (MBP/ADV) 50 mL  1 g IntraVENous Q24H    dexmedeTOMidine (PRECEDEX) 400 mcg in 0.9% sodium chloride 100 mL infusion  0.2-0.7 mcg/kg/hr IntraVENous TITRATE    Vancomycin Intermittent Dosing   Other Rx Dosing/Monitoring    sodium chloride (NS) flush 30 mL  30 mL InterCATHeter Q8H    sodium chloride (NS) flush 30 mL  30 mL InterCATHeter PRN    LORazepam (ATIVAN) tablet 1 mg  1 mg Oral Q4H PRN    pantoprazole (PROTONIX) 40 mg in 0.9% sodium chloride 10 mL injection  40 mg IntraVENous Q24H    fentaNYL in normal saline (pf) 25 mcg/mL infusion  0-200 mcg/hr IntraVENous TITRATE    NOREPINephrine (LEVOPHED) 4 mg in 5% dextrose 250 mL infusion  0.5-16 mcg/min IntraVENous TITRATE    midazolam in normal saline (VERSED) 1 mg/mL infusion  0-10 mg/hr IntraVENous TITRATE    sodium chloride (NS) flush 5-40 mL  5-40 mL IntraVENous Q8H    sodium chloride (NS) flush 5-40 mL  5-40 mL IntraVENous PRN    midazolam (VERSED) injection 2 mg  2 mg IntraVENous Multiple    NUTRITIONAL SUPPORT ELECTROLYTE PRN ORDERS   Does Not Apply PRN    senna-docusate (PERICOLACE) 8.6-50 mg per tablet 2 Tab  2 Tab Oral BID    LORazepam (ATIVAN) injection 1 mg  1 mg IntraVENous Q4H PRN    traMADoL (ULTRAM) tablet 100 mg  100 mg Oral Q6H PRN    oxyCODONE-acetaminophen (PERCOCET) 5-325 mg per tablet 1 Tab  1 Tab Oral Q4H PRN    insulin regular (NOVOLIN R, HUMULIN R) injection   SubCUTAneous AC&HS    alcohol 62% (NOZIN) nasal  1 Ampule  1 Ampule Topical Q12H    rosuvastatin (CRESTOR) tablet 40 mg  40 mg Oral QHS    dextrose 5% - 0.45% NaCl with KCl 20 mEq/L infusion  25 mL/hr IntraVENous CONTINUOUS    sodium chloride (NS) flush 5-40 mL  5-40 mL IntraVENous Q8H    sodium chloride (NS) flush 5-40 mL  5-40 mL IntraVENous PRN    naloxone (NARCAN) injection 0.4 mg  0.4 mg IntraVENous PRN    sodium bicarbonate (8.4%) injection 50 mEq  50 mEq IntraVENous PRN    nitroglycerin (Tridil) 200 mcg/ml infusion   mcg/min IntraVENous TITRATE    amiodarone (CORDARONE) tablet 200 mg  200 mg Oral Q12H    metoprolol tartrate (LOPRESSOR) tablet 25 mg  25 mg Oral Q12H    ondansetron (ZOFRAN) injection 4 mg  4 mg IntraVENous Q4H PRN    dextrose (D50W) injection syrg 12.5 g  25 mL IntraVENous PRN    aspirin chewable tablet 81 mg  81 mg Oral DAILY    magnesium sulfate 1 g/100 ml IVPB (premix or compounded)  1 g IntraVENous PRN    EPINEPHrine (ADRENALIN) 4 mg in 0.9% sodium chloride 250 mL infusion (PRE-MIX)  0.01-0.05 mcg/kg/min IntraVENous TITRATE    acetaminophen (TYLENOL) tablet 650 mg  650 mg Oral Q4H PRN       Exam:  Vitals:    10/07/20 1030 10/07/20 1044 10/07/20 1058 10/07/20 1124   BP: 134/63 (!) 144/64 (!) 143/63    Pulse: 64 65 66 65   Resp: 29 22 25 28   Temp:       SpO2: 93% 93% 93% 93%   Weight:       Height:             Intake/Output Summary (Last 24 hours) at 10/7/2020 1228  Last data filed at 10/7/2020 1058  Gross per 24 hour   Intake 2026.48 ml   Output 4930 ml   Net -2903.52 ml     PE:  GEN - in no distress  CV - regular, no murmur, no rub  Lung - clear bilaterally  Abd - soft, nontender  Ext - no edema    Labs  Recent Labs     10/07/20  0302 10/06/20  0329 10/05/20  0309   WBC 22.1* 17.0* 13.0*   HGB 9.7* 8.8* 9.3*   HCT 29.3* 26.8* 28.8*    248 234     Recent Labs     10/07/20  0302 10/06/20  0329 10/05/20  0309    143 140   K 3.7 3.3* 3.5    107 105   CO2 36* 30 31   BUN 44* 38* 38*   CREA 2.70* 2.05* 1.96*   * 219* 155*   CA 8.7 7.2* 7.8*   MG 2.5* 2.1 2.8*   PHOS 2.5  --  2.0*     No results for input(s): PH, PCO2, PO2, PCO2 in the last 72 hours. Problem List:  Patient Active Problem List    Diagnosis Date Noted    YURI (acute kidney injury) ([facility] 10/05/2020    Hypotension 10/03/2020    [facility] with [facility] 10/02/2020    Bilateral pulmonary infiltrates on chest x-ray 10/02/2020    Pulmonary edema 10/01/2020    Anxiety 10/01/2020    AMOR on CPAP 09/30/2020    Paroxysmal [facility]-fib ([facility] 09/29/2020    Atherosclerosis of native coronary artery of native heart with unstable angina pectoris ([facility] 09/29/2020    S/P CABG x 4 09/29/2020    Suspected sleep apnea 09/29/2020    CAD (coronary artery disease) 09/29/2020    Paroxysmal atrial fibrillation ([facility] 09/18/2020    Essential hypertension 09/18/2020    Mixed hyperlipidemia 09/18/2020    Uterine cancer ([facility] 09/18/2020    Chest pain 09/18/2020    Atrial fibrillation ([facility] 09/18/2020       Issues Addressed By Nephrology:    Plan:  1. S/P CABG X4 V  2. Resp Failure  3. Pulmonary edema  4.  YURI on CKD worsening azotemia; stopped lasix drip  Continue supportive care

## 2020-10-08 ENCOUNTER — ANESTHESIA (OUTPATIENT)
Dept: SURGERY | Age: 60
End: 2020-10-08

## 2020-10-08 ENCOUNTER — APPOINTMENT (OUTPATIENT)
Dept: GENERAL RADIOLOGY | Age: 60
DRG: 228 | End: 2020-10-08
Attending: THORACIC SURGERY (CARDIOTHORACIC VASCULAR SURGERY)
Payer: COMMERCIAL

## 2020-10-08 LAB
ANION GAP SERPL CALC-SCNC: 3 MMOL/L (ref 7–16)
ARTERIAL PATENCY WRIST A: YES
BACTERIA SPEC CULT: NORMAL
BASE EXCESS BLD CALC-SCNC: 12 MMOL/L
BDY SITE: ABNORMAL
BUN SERPL-MCNC: 53 MG/DL (ref 8–23)
C. PNEUMONAIE, CPPT: NOT DETECTED
CALCIUM SERPL-MCNC: 8.7 MG/DL (ref 8.3–10.4)
CHLORIDE SERPL-SCNC: 106 MMOL/L (ref 98–107)
CO2 BLD-SCNC: 37 MMOL/L
CO2 SERPL-SCNC: 36 MMOL/L (ref 21–32)
COLLECT TIME,HTIME: 342
CREAT SERPL-MCNC: 2.6 MG/DL (ref 0.6–1)
ERYTHROCYTE [DISTWIDTH] IN BLOOD BY AUTOMATED COUNT: 15.8 % (ref 11.9–14.6)
EXHALED MINUTE VOLUME, VE: 7.8 L/MIN
GAS FLOW.O2 O2 DELIVERY SYS: ABNORMAL L/MIN
GAS FLOW.O2 SETTING OXYMISER: 12 BPM
GLUCOSE BLD STRIP.AUTO-MCNC: 154 MG/DL (ref 65–100)
GLUCOSE BLD STRIP.AUTO-MCNC: 191 MG/DL (ref 65–100)
GLUCOSE BLD STRIP.AUTO-MCNC: 266 MG/DL (ref 65–100)
GLUCOSE SERPL-MCNC: 282 MG/DL (ref 65–100)
GRAM STN SPEC: NORMAL
HCO3 BLD-SCNC: 35.9 MMOL/L (ref 22–26)
HCT VFR BLD AUTO: 32.1 % (ref 35.8–46.3)
HGB BLD-MCNC: 10.5 G/DL (ref 11.7–15.4)
L PNEUMO DNA SPEC QL NAA+PROBE: NOT DETECTED
M. PNEUMONIAE, MPPT: NOT DETECTED
MAGNESIUM SERPL-MCNC: 2.9 MG/DL (ref 1.8–2.4)
MCH RBC QN AUTO: 29.1 PG (ref 26.1–32.9)
MCHC RBC AUTO-ENTMCNC: 32.7 G/DL (ref 31.4–35)
MCV RBC AUTO: 88.9 FL (ref 79.6–97.8)
NRBC # BLD: 0 K/UL (ref 0–0.2)
O2/TOTAL GAS SETTING VFR VENT: 35 %
PAN LEGIONELLA: NOT DETECTED
PCO2 BLD: 44.4 MMHG (ref 35–45)
PEEP RESPIRATORY: 8 CMH2O
PH BLD: 7.52 [PH] (ref 7.35–7.45)
PLATELET # BLD AUTO: 337 K/UL (ref 150–450)
PMV BLD AUTO: 11 FL (ref 9.4–12.3)
PO2 BLD: 81 MMHG (ref 75–100)
POTASSIUM SERPL-SCNC: 3.4 MMOL/L (ref 3.5–5.1)
RBC # BLD AUTO: 3.61 M/UL (ref 4.05–5.2)
SAO2 % BLD: 97 % (ref 95–98)
SERVICE CMNT-IMP: ABNORMAL
SERVICE CMNT-IMP: NORMAL
SODIUM SERPL-SCNC: 145 MMOL/L (ref 136–145)
SPECIMEN TYPE: ABNORMAL
VANCOMYCIN SERPL-MCNC: 11.3 UG/ML
VENTILATION MODE VENT: ABNORMAL
VT SETTING VENT: 400 ML
WBC # BLD AUTO: 25.6 K/UL (ref 4.3–11.1)

## 2020-10-08 PROCEDURE — 74011000258 HC RX REV CODE- 258: Performed by: INTERNAL MEDICINE

## 2020-10-08 PROCEDURE — 94003 VENT MGMT INPAT SUBQ DAY: CPT

## 2020-10-08 PROCEDURE — 74011250636 HC RX REV CODE- 250/636: Performed by: NURSE PRACTITIONER

## 2020-10-08 PROCEDURE — 74011250636 HC RX REV CODE- 250/636: Performed by: INTERNAL MEDICINE

## 2020-10-08 PROCEDURE — 71045 X-RAY EXAM CHEST 1 VIEW: CPT

## 2020-10-08 PROCEDURE — 77010033711 HC HIGH FLOW OXYGEN

## 2020-10-08 PROCEDURE — 77030012890

## 2020-10-08 PROCEDURE — 74011250637 HC RX REV CODE- 250/637: Performed by: THORACIC SURGERY (CARDIOTHORACIC VASCULAR SURGERY)

## 2020-10-08 PROCEDURE — 97530 THERAPEUTIC ACTIVITIES: CPT

## 2020-10-08 PROCEDURE — 36592 COLLECT BLOOD FROM PICC: CPT

## 2020-10-08 PROCEDURE — 99233 SBSQ HOSP IP/OBS HIGH 50: CPT | Performed by: INTERNAL MEDICINE

## 2020-10-08 PROCEDURE — 74011000258 HC RX REV CODE- 258: Performed by: NURSE PRACTITIONER

## 2020-10-08 PROCEDURE — 74011636637 HC RX REV CODE- 636/637: Performed by: PHYSICIAN ASSISTANT

## 2020-10-08 PROCEDURE — 80048 BASIC METABOLIC PNL TOTAL CA: CPT

## 2020-10-08 PROCEDURE — C9113 INJ PANTOPRAZOLE SODIUM, VIA: HCPCS | Performed by: INTERNAL MEDICINE

## 2020-10-08 PROCEDURE — 2709999900 HC NON-CHARGEABLE SUPPLY

## 2020-10-08 PROCEDURE — 74011636637 HC RX REV CODE- 636/637: Performed by: THORACIC SURGERY (CARDIOTHORACIC VASCULAR SURGERY)

## 2020-10-08 PROCEDURE — 65610000006 HC RM INTENSIVE CARE

## 2020-10-08 PROCEDURE — 74011250637 HC RX REV CODE- 250/637: Performed by: INTERNAL MEDICINE

## 2020-10-08 PROCEDURE — 80202 ASSAY OF VANCOMYCIN: CPT

## 2020-10-08 PROCEDURE — 36600 WITHDRAWAL OF ARTERIAL BLOOD: CPT

## 2020-10-08 PROCEDURE — 85027 COMPLETE CBC AUTOMATED: CPT

## 2020-10-08 PROCEDURE — 83735 ASSAY OF MAGNESIUM: CPT

## 2020-10-08 PROCEDURE — 82962 GLUCOSE BLOOD TEST: CPT

## 2020-10-08 PROCEDURE — 74011000250 HC RX REV CODE- 250: Performed by: INTERNAL MEDICINE

## 2020-10-08 PROCEDURE — 82803 BLOOD GASES ANY COMBINATION: CPT

## 2020-10-08 PROCEDURE — 74011250637 HC RX REV CODE- 250/637: Performed by: PHYSICIAN ASSISTANT

## 2020-10-08 PROCEDURE — 74011250636 HC RX REV CODE- 250/636: Performed by: THORACIC SURGERY (CARDIOTHORACIC VASCULAR SURGERY)

## 2020-10-08 PROCEDURE — 97161 PT EVAL LOW COMPLEX 20 MIN: CPT

## 2020-10-08 RX ORDER — HYDROCORTISONE 25 MG/G
CREAM TOPICAL
Status: DISCONTINUED | OUTPATIENT
Start: 2020-10-08 | End: 2020-10-12 | Stop reason: HOSPADM

## 2020-10-08 RX ORDER — POTASSIUM CHLORIDE 14.9 MG/ML
20 INJECTION INTRAVENOUS ONCE
Status: COMPLETED | OUTPATIENT
Start: 2020-10-08 | End: 2020-10-08

## 2020-10-08 RX ADMIN — ASPIRIN 81 MG: 81 TABLET, CHEWABLE ORAL at 11:48

## 2020-10-08 RX ADMIN — Medication 30 ML: at 21:42

## 2020-10-08 RX ADMIN — Medication 10 ML: at 05:08

## 2020-10-08 RX ADMIN — Medication 10 ML: at 05:09

## 2020-10-08 RX ADMIN — HYDRALAZINE HYDROCHLORIDE 20 MG: 20 INJECTION, SOLUTION INTRAMUSCULAR; INTRAVENOUS at 21:57

## 2020-10-08 RX ADMIN — METOPROLOL TARTRATE 25 MG: 25 TABLET, FILM COATED ORAL at 20:46

## 2020-10-08 RX ADMIN — INSULIN HUMAN 2 UNITS: 100 INJECTION, SOLUTION PARENTERAL at 21:40

## 2020-10-08 RX ADMIN — METOPROLOL TARTRATE 25 MG: 25 TABLET, FILM COATED ORAL at 11:48

## 2020-10-08 RX ADMIN — VANCOMYCIN HYDROCHLORIDE 1000 MG: 1 INJECTION, POWDER, LYOPHILIZED, FOR SOLUTION INTRAVENOUS at 04:48

## 2020-10-08 RX ADMIN — HYDRALAZINE HYDROCHLORIDE 10 MG: 20 INJECTION, SOLUTION INTRAMUSCULAR; INTRAVENOUS at 11:20

## 2020-10-08 RX ADMIN — Medication 30 ML: at 05:08

## 2020-10-08 RX ADMIN — PANTOPRAZOLE SODIUM 40 MG: 40 INJECTION, POWDER, FOR SOLUTION INTRAVENOUS at 10:15

## 2020-10-08 RX ADMIN — POTASSIUM CHLORIDE 20 MEQ: 200 INJECTION, SOLUTION INTRAVENOUS at 07:03

## 2020-10-08 RX ADMIN — Medication 30 ML: at 16:45

## 2020-10-08 RX ADMIN — DEXMEDETOMIDINE 0.4 MCG/KG/HR: 100 INJECTION, SOLUTION, CONCENTRATE INTRAVENOUS at 03:24

## 2020-10-08 RX ADMIN — OXYCODONE HYDROCHLORIDE AND ACETAMINOPHEN 1 TABLET: 5; 325 TABLET ORAL at 11:48

## 2020-10-08 RX ADMIN — INSULIN HUMAN 6 UNITS: 100 INJECTION, SOLUTION PARENTERAL at 16:44

## 2020-10-08 RX ADMIN — LORAZEPAM 1 MG: 1 TABLET ORAL at 21:42

## 2020-10-08 RX ADMIN — CEFEPIME 1 G: 1 INJECTION, POWDER, FOR SOLUTION INTRAMUSCULAR; INTRAVENOUS at 12:13

## 2020-10-08 RX ADMIN — OXYCODONE HYDROCHLORIDE AND ACETAMINOPHEN 1 TABLET: 5; 325 TABLET ORAL at 17:10

## 2020-10-08 RX ADMIN — ROSUVASTATIN CALCIUM 40 MG: 20 TABLET, COATED ORAL at 20:46

## 2020-10-08 RX ADMIN — Medication 1 AMPULE: at 10:11

## 2020-10-08 RX ADMIN — INSULIN HUMAN 5 UNITS: 100 INJECTION, SOLUTION PARENTERAL at 10:10

## 2020-10-08 RX ADMIN — HYDRALAZINE HYDROCHLORIDE 20 MG: 20 INJECTION, SOLUTION INTRAMUSCULAR; INTRAVENOUS at 16:44

## 2020-10-08 RX ADMIN — AMIODARONE HYDROCHLORIDE 200 MG: 200 TABLET ORAL at 20:46

## 2020-10-08 RX ADMIN — AMIODARONE HYDROCHLORIDE 200 MG: 200 TABLET ORAL at 11:48

## 2020-10-08 RX ADMIN — Medication 10 ML: at 21:42

## 2020-10-08 RX ADMIN — Medication 1 AMPULE: at 20:45

## 2020-10-08 RX ADMIN — INSULIN GLARGINE 25 UNITS: 100 INJECTION, SOLUTION SUBCUTANEOUS at 21:40

## 2020-10-08 NOTE — PROGRESS NOTES
Critical Care Daily Progress Note: 10/8/2020  Admission Date: 9/29/2020     The patient's chart is reviewed and the patient is discussed with the staff. 55 y/o female Patient had CABG x 4 on 9/29. Currently is sedated in CV-ICU and orally intubated receiving  mechanical ventilation. Pt presented to the ER at [facility] on 9/18/2020 with chest pain and dyspnea. She was found to be in afib RVR. She was started on cardizem and admitted by cardiology. She underwent LHC by Dr. Hoda Hernandez which revealed MVCAD.  She was extubated on day of surgery but reintubated 10/2  Subjective:   Still on vent  Agitated but not awake and following commands  On Lasix gtt, net negative >1L past 2 days  Also on low dose precedex gtt  Continue to have fevers- improved 100.5 this AM, WBC up (platelets, Hg too, hemoconcentration with diuresis?)    Current Facility-Administered Medications   Medication Dose Route Frequency    vancomycin (VANCOCIN) 1,000 mg in 0.9% sodium chloride (MBP/ADV) 250 mL  1,000 mg IntraVENous ONCE    potassium chloride 20 mEq in 100 ml IVPB  20 mEq IntraVENous ONCE    niCARdipine in Saline (CARDENE) 0.1mg/mL 200 ml premix infusion  5-15 mg/hr IntraVENous TITRATE    insulin glargine (LANTUS) injection 25 Units  25 Units SubCUTAneous QHS    cefepime (MAXIPIME) 1 g in 0.9% sodium chloride (MBP/ADV) 50 mL  1 g IntraVENous Q24H    hydrALAZINE (APRESOLINE) 20 mg/mL injection 20 mg  20 mg IntraVENous Q4H PRN    amiodarone (CORDARONE) 150 mg in dextrose 5% 100 mL bolus infusion  150 mg IntraVENous ONCE    amiodarone (CORDARONE) 450 mg in dextrose 5% 250 mL infusion  0.5-1 mg/min IntraVENous TITRATE    dexmedeTOMidine (PRECEDEX) 400 mcg in 0.9% sodium chloride 100 mL infusion  0.2-0.7 mcg/kg/hr IntraVENous TITRATE    Vancomycin Intermittent Dosing   Other Rx Dosing/Monitoring    sodium chloride (NS) flush 30 mL  30 mL InterCATHeter Q8H    sodium chloride (NS) flush 30 mL  30 mL InterCATHeter PRN    LORazepam (ATIVAN) tablet 1 mg  1 mg Oral Q4H PRN    pantoprazole (PROTONIX) 40 mg in 0.9% sodium chloride 10 mL injection  40 mg IntraVENous Q24H    fentaNYL in normal saline (pf) 25 mcg/mL infusion  0-200 mcg/hr IntraVENous TITRATE    NOREPINephrine (LEVOPHED) 4 mg in 5% dextrose 250 mL infusion  0.5-16 mcg/min IntraVENous TITRATE    midazolam in normal saline (VERSED) 1 mg/mL infusion  0-10 mg/hr IntraVENous TITRATE    sodium chloride (NS) flush 5-40 mL  5-40 mL IntraVENous Q8H    sodium chloride (NS) flush 5-40 mL  5-40 mL IntraVENous PRN    midazolam (VERSED) injection 2 mg  2 mg IntraVENous Multiple    NUTRITIONAL SUPPORT ELECTROLYTE PRN ORDERS   Does Not Apply PRN    senna-docusate (PERICOLACE) 8.6-50 mg per tablet 2 Tab  2 Tab Oral BID    LORazepam (ATIVAN) injection 1 mg  1 mg IntraVENous Q4H PRN    traMADoL (ULTRAM) tablet 100 mg  100 mg Oral Q6H PRN    oxyCODONE-acetaminophen (PERCOCET) 5-325 mg per tablet 1 Tab  1 Tab Oral Q4H PRN    insulin regular (NOVOLIN R, HUMULIN R) injection   SubCUTAneous AC&HS    alcohol 62% (NOZIN) nasal  1 Ampule  1 Ampule Topical Q12H    rosuvastatin (CRESTOR) tablet 40 mg  40 mg Oral QHS    sodium chloride (NS) flush 5-40 mL  5-40 mL IntraVENous Q8H    sodium chloride (NS) flush 5-40 mL  5-40 mL IntraVENous PRN    naloxone (NARCAN) injection 0.4 mg  0.4 mg IntraVENous PRN    sodium bicarbonate (8.4%) injection 50 mEq  50 mEq IntraVENous PRN    nitroglycerin (Tridil) 200 mcg/ml infusion   mcg/min IntraVENous TITRATE    amiodarone (CORDARONE) tablet 200 mg  200 mg Oral Q12H    metoprolol tartrate (LOPRESSOR) tablet 25 mg  25 mg Oral Q12H    ondansetron (ZOFRAN) injection 4 mg  4 mg IntraVENous Q4H PRN    dextrose (D50W) injection syrg 12.5 g  25 mL IntraVENous PRN    aspirin chewable tablet 81 mg  81 mg Oral DAILY    magnesium sulfate 1 g/100 ml IVPB (premix or compounded)  1 g IntraVENous PRN    EPINEPHrine (ADRENALIN) 4 mg in 0.9% sodium chloride 250 mL infusion (PRE-MIX)  0.01-0.05 mcg/kg/min IntraVENous TITRATE    acetaminophen (TYLENOL) tablet 650 mg  650 mg Oral Q4H PRN     Review of Systems   Unobtainable due to patient status -- sedative on Vent. Objective:     Vitals:    10/08/20 0350 10/08/20 0400 10/08/20 0415 10/08/20 0437   BP:  (!) 148/67 (!) 168/83    Pulse: (!) 59 63 62    Resp: 23 (!) 37 26 23   Temp:       SpO2: 97% 98% 98%    Weight:       Height:           Intake/Output Summary (Last 24 hours) at 10/8/2020 0505  Last data filed at 10/8/2020 0330  Gross per 24 hour   Intake 1764.17 ml   Output 3630 ml   Net -1865.83 ml     Physical Exam:          Constitutional:  intubated and mechanically ventilated.   EENMT:  Sclera clear, pupils equal, oral mucosa moist  Respiratory:  crackles  Cardiovascular:  RRR with no M,G,R;  Gastrointestinal:  soft with no tenderness; positive bowel sounds present  Musculoskeletal:  warm with no cyanosis, no lower extremity edema  Skin:  no jaundice or ecchymosis  Neurologic: no gross neuro deficits     Psychiatric:  Wakes up some    LINES:    ETT, central    DRIPS:      Lasix gtt  Precedex gtt    CXR: improved infiltrates today          Ventilator Settings  Mode FIO2 Rate Tidal Volume Pressure PEEP   Pressure support  35 %    400 ml  8 cm H2O  8 cm H20      Peak airway pressure: 17 cm H2O   Minute ventilation: 7.3 l/min     ABG:   Recent Labs     10/08/20  0345 10/07/20  1401 10/07/20  1229   PHI 7.52* 7.54* 7.56*   PCO2I 44.4 41.9 38.6   PO2I 81 64* 68*   HCO3I 35.9* 35.9* 34.3*     LAB  Recent Labs     10/07/20  2238 10/07/20  1556 10/07/20  1102 10/07/20  0606 10/06/20  2115   GLUCPOC 231* 296* 277* 235* 216*     Recent Labs     10/08/20  0258 10/07/20  0302 10/06/20  0329   WBC 25.6* 22.1* 17.0*   HGB 10.5* 9.7* 8.8*   HCT 32.1* 29.3* 26.8*    299 248     Recent Labs     10/08/20  0258 10/07/20  0302 10/06/20  0329    142 143   K 3.4* 3.7 3.3*    102 107   CO2 36* 36* 30 * 193* 219*   BUN 53* 44* 38*   CREA 2.60* 2.70* 2.05*   MG 2.9* 2.5* 2.1   PHOS  --  2.5  --    CA 8.7 8.7 7.2*     No results for input(s): LCAD, LAC in the last 72 hours.     Patient Active Problem List   Diagnosis Code    Paroxysmal atrial fibrillation (HCC) I48.0    Essential hypertension I10    Mixed hyperlipidemia E78.2    Uterine cancer ([facility] C55    Chest pain R07.9    Atrial fibrillation (HCC) I48.91    Paroxysmal A-fib (HCC) I48.0    Atherosclerosis of native coronary artery of native heart with unstable angina pectoris (HCC) I25.110    S/P CABG x 4 Z95.1    Suspected sleep apnea R29.818    CAD (coronary artery disease) I25.10    AMOR on CPAP G47.33, Z99.89    Pulmonary edema J81.1    Anxiety F41.9    Acute respiratory failure with hypoxia ([facility]) J96.01    Bilateral pulmonary infiltrates on chest x-ray R91.8    Hypotension I95.9    YURI (acute kidney injury) ([facility] N17.9     Assessment:  (Medical Decision Making)     Hospital Problems  Date Reviewed: 9/22/2020          Codes Class Noted POA    YURI (acute kidney injury) ([facility] ICD-10-CM: N17.9  ICD-9-CM: 584.9  10/5/2020 Unknown         Hypotension ICD-10-CM: I95.9  ICD-9-CM: 458.9  10/3/2020 Unknown        Acute respiratory failure with hypoxia ([facility] ICD-10-CM: J96.01  ICD-9-CM: 518.81  10/2/2020 Unknown        Bilateral pulmonary infiltrates on chest x-ray ICD-10-CM: R91.8  ICD-9-CM: 793.19  10/2/2020 Unknown    Improved-increased procal but cultures negative    Pulmonary edema ICD-10-CM: J81.1  ICD-9-CM: 514  10/1/2020 Unknown        Anxiety ICD-10-CM: F41.9  ICD-9-CM: 300.00  10/1/2020 Unknown        AMOR on CPAP ICD-10-CM: G47.33, Z99.89  ICD-9-CM: 327.23, V46.8  9/30/2020 Unknown        Paroxysmal A-fib (HCC) ICD-10-CM: I48.0  ICD-9-CM: 427.31  9/29/2020 Unknown        Atherosclerosis of native coronary artery of native heart with unstable angina pectoris (HCC) ICD-10-CM: I25.110  ICD-9-CM: 414.01, 411.1  9/29/2020 Unknown        * (Principal) S/P CABG x 4 ICD-10-CM: Z95.1  ICD-9-CM: V45.81  9/29/2020 Unknown        CAD (coronary artery disease) ICD-10-CM: I25.10  ICD-9-CM: 414.00  9/29/2020 Unknown        Paroxysmal atrial fibrillation (HCC) ICD-10-CM: I48.0  ICD-9-CM: 427.31  9/18/2020 Unknown            Plan:  (Medical Decision Making)   - awake, alert, following commands on PSV 5/8 35%, able to lift head, follow commands, pulled 1L on deep breath   - extubate this AM  - Changed to Cefepime with ID yesterday  - Temp improved, WBC up, but may be hemoconcentrated, off pressors  - off pressors, drips  - TF off past 2 hours+  - bedside swallow evaluation, may need speech to see  - monitor off lasix  - respiratory culture NGTD 10/6, blood cultures 10/7  - no DVT on dopplers 10/7    More than 50% of the time documented was spent in face-to-face contact with the patient and in the care of the patient on the floor/unit where the patient is located.     Chelsie Moncada MD

## 2020-10-08 NOTE — WOUND CARE
Patient seen for left thigh incision near groin. It is dry but soft layer 0.3cm wide and nurses concerned about getting it infected as patient was having frequent stool contaminating site. Looks clean now and reported that decrease BMs during night. They had started silver dressing but it was having to be changed frequently due to stool. If silver dressing not easily used, recommend trying to paint incision with betadine and dry dressing. Wound team will be available for assistance if needed.

## 2020-10-08 NOTE — PROGRESS NOTES
Problem: Mobility Impaired (Adult and Pediatric)  Goal: *Acute Goals and Plan of Care (Insert Text)  Outcome: Progressing Towards Goal  Note: LTG:  (1.)Ms. Daly will move from supine to sit and sit to supine , scoot up and down, and roll side to side with INDEPENDENT within 7 treatment day(s). (2.)Ms. Daly will transfer from bed to chair and chair to bed with INDEPENDENT using the least restrictive device within 7 treatment day(s). (3.)Ms. Daly will ambulate with INDEPENDENT for 500+ feet with the least restrictive device within 7 treatment day(s) while maintaining normal vital signs. (4.)Ms. Daly will perform 3-4 steps with HR and SBA within 7 treatment days for safety ascending and descending stairs. (5.)Ms. Daly will be independent in HEP for B LE strength and mobility within 7 treatment days.    ___________________________________________________________________________________________      PHYSICAL THERAPY: Daily Note and PM 10/8/2020  INPATIENT: PT Visit Days : 1  Payor: Bharat [employer] / Plan: [facility] / Product Type: PPO /       NAME/AGE/GENDER: Gregory Whitaker is a 56 y.o. female   PRIMARY DIAGNOSIS: Atherosclerosis of native coronary artery with unstable angina pectoris, unspecified whether native or transplanted heart (HCC) [I25.110]  Paroxysmal A-fib ([facility]) [I48.0]  Paroxysmal A-fib ([facility] [I48.0]  Atherosclerosis of native coronary artery of native heart with unstable angina pectoris ([facility] [I25.110]  CAD (coronary artery disease) [I25.10]  Paroxysmal atrial fibrillation ([facility]) [I48.0] S/P CABG x 4 S/P CABG x 4  Procedure(s) (LRB):  BRONCHOSCOPY ROOM 104 (N/A)  BRONCHIAL ALVEOLAR LAVAGE (N/A)  6 Days Post-Op  ICD-10: Treatment Diagnosis:    · Generalized Muscle Weakness (M62.81)  · Difficulty in walking, Not elsewhere classified (R26.2)   Precaution/Allergies:  Influenza virus vaccines      ASSESSMENT:     Ms. Rubina Belcher presents with decreased bed mobility, transfers, ambulation, balance, activity tolerance, strength and overall general functional mobility s/p hospital admission with above, now CABG x 4 on 20. Pt with complications post surgery, extubated this am 10/8/20, now on airvo, seen in CVICU. Pt A & O but appears drowsy, difficult to follow commands at times, stay on task. Prior to surgery, pt works as hospice RN, independent at baseline. Pt lives alone per chart, pt states \"in-laws are in and out\", spouse . Pt in chair on arrival, RN present at bedside, pt with BM noted in chair. Pt required min A for transfers, fair static standing balance for cleaning. Pt then HHA and MIN A 3-4' to [facility]. Pt required CGA in sitting due to safety concerns weight shifting, balance. Pt then MIN A for sit to stand, fair static for cleaning, cues for posture, weight shifting. Pt then min A x 2 for safety from [facility] to chair, pt fatigued with mobility. Pt required mod/max A x 2 for positioning in chair. Left with RN attending to further pt care needs. PT to follow for acute care needs to address deficits noted above. Anticipate pt to do well with activity due to prior level of function of independent. Pm note: pt supine on arrival, alert but confused. Pt on 3 L/min O2 via n.c. Pt min A for bed mobility, transfers. Pt needed to use BSC, min A to commode, total assist for cleaning. Pt with fair sitting balance, somewhat impulsive at times. Pt then ambulated with upright walker on 3 L/min for 120', cues for posture, technique, safety with mobility. Pt min/mod a to return to supine. Pt improved this afternoon with mobility, ambulation tolerance, making progress toward goals. PT to cont to follow for acute care needs. This section established at most recent assessment   PROBLEM LIST (Impairments causing functional limitations):  1. Decreased Strength  2. Decreased ADL/Functional Activities  3. Decreased Transfer Abilities  4.  Decreased Ambulation Ability/Technique  5. Decreased Balance  6. Decreased Activity Tolerance  7. Decreased [place] with Home Exercise Program  8. Decreased Cognition   INTERVENTIONS PLANNED: (Benefits and precautions of physical therapy have been discussed with the patient.)  1. Balance Exercise  2. Bed Mobility  3. Family Education  4. Gait Training  5. Home Exercise Program (HEP)  6. Therapeutic Activites  7. Therapeutic Exercise/Strengthening  8. Transfer Training     TREATMENT PLAN: Frequency/Duration: twice daily for duration of hospital stay  Rehabilitation Potential For Stated Goals: Good     REHAB RECOMMENDATIONS (at time of discharge pending progress):    Placement: It is my opinion, based on this patient's performance to date, that Ms. Daly may benefit from participating in 1-2 additional therapy sessions in order to continue to assess for rehab potential and then make recommendation for disposition at discharge. Equipment:    None at this time              HISTORY:   History of Present Injury/Illness (Reason for Referral):  S/p CABG x 4, see H & P  Past Medical History/Comorbidities:   Ms. Maria Antonia Lim  has a past medical history of Anxiety, Borderline diabetes, Endometrial cancer ([facility] Essential hypertension (9/18/2020), Heart failure ([address] History of anemia, Mixed hyperlipidemia (9/18/2020), Paroxysmal atrial fibrillation ([address] (9/18/2020), and Sleep apnea. She also has no past medical history of Difficult intubation, Malignant hyperthermia due to anesthesia, Nausea & vomiting, or Pseudocholinesterase deficiency. Ms. Maria Antonia Lim  has a past surgical history that includes hx hysterectomy; hx heart catheterization (09/18/2020); hx cyst removal; hx cyst removal; and hx orthopaedic.   Social History/Living Environment:   Home Environment: Private residence  # Steps to Enter: 0  One/Two Story Residence: Two story, live on 1st floor  Living Alone: Yes  Support Systems: Child(licha), Family member(s)  Patient Expects to be Discharged to[de-identified] Private residence  Current DME Used/Available at Home: CPAP  Prior Level of Function/Work/Activity:  Lives alone, independent at baseline  Personal Factors:          Sex:  female        Age:  56 y.o. Overall Behavior:  agreeable, confused   Number of Personal Factors/Comorbidities that affect the Plan of Care:  Cancer, DM  1-2: MODERATE COMPLEXITY   EXAMINATION:   Most Recent Physical Functioning:   Gross Assessment:  AROM: Within functional limits(B LE)  Strength: Generally decreased, functional(B LE)  Coordination: Generally decreased, functional               Posture:  Posture (WDL): Exceptions to WDL  Posture Assessment: Rounded shoulders  Balance:  Sitting: Impaired  Sitting - Static: Good (unsupported)  Sitting - Dynamic: Fair (occasional)  Standing: Impaired  Standing - Static: Fair  Standing - Dynamic : Constant support; Fair Bed Mobility:  Supine to Sit: Minimum assistance  Sit to Supine: Minimum assistance  Scooting: Minimum assistance  Wheelchair Mobility:     Transfers:  Sit to Stand: Minimum assistance  Stand to Sit: Minimum assistance  Gait:     Base of Support: Widened  Speed/Lila: Slow  Step Length: Left shortened;Right shortened  Swing Pattern: Left symmetrical;Right symmetrical  Gait Abnormalities: Decreased step clearance  Distance (ft): 120 Feet (ft)(3 L/min O2)  Assistive Device: Gait belt;Walker, rolling(upright walker)  Ambulation - Level of Assistance: Minimal assistance  Interventions: Tactile cues; Verbal cues      Body Structures Involved:  1. Heart  2. Lungs  3. Muscles Body Functions Affected:  1. Cardio  2. Respiratory  3. Movement Related Activities and Participation Affected:  1. General Tasks and Demands  2. Mobility  3.  Self Care   Number of elements that affect the Plan of Care: 4+: HIGH COMPLEXITY   CLINICAL PRESENTATION:   Presentation: Evolving clinical presentation with changing clinical characteristics: MODERATE COMPLEXITY   CLINICAL DECISION MAKING: [facility] Mobility Inpatient Short Form  How much difficulty does the patient currently have. .. Unable A Lot A Little None   1. Turning over in bed (including adjusting bedclothes, sheets and blankets)? [] 1   [x] 2   [] 3   [] 4   2. Sitting down on and standing up from a chair with arms ( e.g., wheelchair, bedside commode, etc.)   [] 1   [x] 2   [] 3   [] 4   3. Moving from lying on back to sitting on the side of the bed? [] 1   [x] 2   [] 3   [] 4   How much help from another person does the patient currently need. .. Total A Lot A Little None   4. Moving to and from a bed to a chair (including a wheelchair)? [] 1   [] 2   [x] 3   [] 4   5. Need to walk in hospital room? [] 1   [] 2   [x] 3   [] 4   6. Climbing 3-5 steps with a railing? [] 1   [x] 2   [] 3   [] 4   © 2007, Trustees of [address] [place] [address] [address] [address], under license to [employer]. All rights reserved      Score:  Initial: 14 Most Recent: X (Date: -- )    Interpretation of Tool:  Represents activities that are increasingly more difficult (i.e. Bed mobility, Transfers, Gait). Medical Necessity:     · Patient is expected to demonstrate progress in   · strength, range of motion, balance, and coordination  ·  to   · decrease assistance required with overall functional mobility, transfers, ambulation  · .  · Patient demonstrates   · good  ·  rehab potential due to higher previous functional level. Reason for Services/Other Comments:  · Patient   · continues to require present interventions due to patient's inability to perform bed mobility, transfers, ambulation safely and effectively at prior level of function of independent.    · .   Use of outcome tool(s) and clinical judgement create a POC that gives a: Clear prediction of patient's progress: LOW COMPLEXITY            TREATMENT:   (In addition to Assessment/Re-Assessment sessions the following treatments were rendered)   Pre-treatment Symptoms/Complaints: \"I just can't help it\" (regarding BM)  Pain: Initial:   Pain Intensity 1: 0  Post Session:  0/10     Therapeutic Activity: (    23 min): Therapeutic activities including Chair transfers, Toilet transfers, Ambulation on level ground, and walker safety, posture to improve mobility, strength, balance, and coordination. Required minimal Tactile cues; Verbal cues to promote static and dynamic balance in standing and promote coordination of bilateral, lower extremity(s). Braces/Orthotics/Lines/Etc:   · ICU lines and monitors   · O2 3 L/min   Treatment/Session Assessment:    · Response to Treatment:  tolerated mobility well, fatigued   · Interdisciplinary Collaboration:   o Physical Therapist  o Registered Nurse  · After treatment position/precautions:   o Supine in bed  o Bed/Chair-wheels locked  o Bed in low position  o Call light within reach  o RN notified   · Compliance with Program/Exercises: Will assess as treatment progresses  · Recommendations/Intent for next treatment session: \"Next visit will focus on advancements to more challenging activities\".   Total Treatment Duration:  PT Patient Time In/Time Out  Time In: 1447  Time Out: [address] [facility], , PT

## 2020-10-08 NOTE — PROGRESS NOTES
A follow up visit was made to the patient. Emotional support, spiritual presence and   prayer were provided for the patient and her daughter , Bell Carmona. Both were appreciative of the visit and prayer.       LIV Olvera

## 2020-10-08 NOTE — PROGRESS NOTES
TIMEOUT performed prior to extubation on Lali Dela Cruz with RT, primary RN, and charge RN present on 10/8/2020 at 6:12 AM.     Per anesthesia team on admission, patient's intubation was Normal    ABG results as follows:    Lab Results   Component Value Date/Time    pH (POC) 7.52 (H) 10/08/2020 03:45 AM    pCO2 (POC) 44.4 10/08/2020 03:45 AM    pO2 (POC) 81 10/08/2020 03:45 AM    HCO3 (POC) 35.9 (H) 10/08/2020 03:45 AM    sO2 (POC) 97 10/08/2020 03:45 AM    Base deficit (POC) 4 10/02/2020 10:44 AM         The patient is hemodynamically stable and can demonstrate the following on a consistent basis:  follow commands , elevate head off the pillow, nods appropriately to questions. Dr. Radha Breen notified of weaning parameters and order to extubate obtained. Patient extubated by (RT name) RT Tanja to (Type of O2 Device) Airvo at (Amount of of O2) 66%   without complication.

## 2020-10-08 NOTE — PROGRESS NOTES
Today's Date: 10/8/2020  Date of Admission: 9/29/2020    Chart Reviewed. Subjective:     Patient was extubated. She states she currently feels ok and wants to go home. Medications Reviewed. Objective:     Vitals:    10/08/20 1036 10/08/20 1037 10/08/20 1038 10/08/20 1039   BP:       Pulse: 74 74 72 72   Resp: (!) 45 (!) 33 24 29   Temp:       SpO2: (!) 88% (!) 89% 92% 91%   Weight:       Height:           Intake and Output  Current Shift: 10/08 0701 - 10/08 1900  In: 440.9 [I.V.:440.9]  Out: 475 [Urine:475]   Last 3 Shifts: 10/06 1901 - 10/08 0700  In: 2780.7 [I.V.:1523.7]  Out: 5330 [Urine:5330]    Physical Exam:  General: Well Developed, Obese, No Acute Distress, Alert & Oriented x 3, Appropriate mood  Neck: supple, no JVD  Heart: S1S2 with RRR without murmurs or gallops  Lungs: Clear throughout auscultation bilaterally  Abd: soft, nontender, nondistended, with good bowel sounds  Ext: no edema bilaterally  Sternal incision: clean, dry, and intact  Skin: warm and dry    LABS  Data Review:   Recent Labs     10/08/20  0258 10/07/20  0302    142   K 3.4* 3.7   MG 2.9* 2.5*   BUN 53* 44*   CREA 2.60* 2.70*   * 193*   WBC 25.6* 22.1*   HGB 10.5* 9.7*   HCT 32.1* 29.3*    299       Estimated Creatinine Clearance: 22.3 mL/min (A) (based on SCr of 2.6 mg/dL (H)).       Assessment/Plan:     Principal Problem:    S/P CABG x 4 (9/29/2020)  Was re-intubated 10/2, now extubated, still febrile at times, cultures neg to date, no DVT in upper or lower extremities, on IV antibiotics, ID following      Active Problems:    Paroxysmal atrial fibrillation ([address] (9/18/2020)  In sinus rhythm currently, intermittent a-fib yesterday        Paroxysmal A-fib ([address] (9/29/2020)    S/p MAZE, in sinus currently        Atherosclerosis of native coronary artery of native heart with unstable angina pectoris ([facility] (9/29/2020)          CAD (coronary artery disease) (9/29/2020)  S/p CABG        AMOR on CPAP (9/30/2020)       Pulmonary edema (10/1/2020)  Improved       Anxiety (10/1/2020)          Acute respiratory failure with hypoxia ([address] (10/2/2020)  Extubated, on O2 by NC       Bilateral pulmonary infiltrates on chest x-ray (10/2/2020)  Chest xray improved, stopped lasix drip given worsening renal function, large volume of diuresis last few days       Hypotension (10/3/2020)  Resolved        YURI (acute kidney injury) ([address] (10/5/2020)  nephrology following      DM  Was not on any meds at home, Hgb A1C 8.2 pre-op, on Lantus, continue to titrate as needed         Shayla Grijalva PA-C

## 2020-10-08 NOTE — PROGRESS NOTES
Ventilator check complete; patient has a #7.5 ET tube secured at the 23 at the lip. Patient is not sedated. Patient is not able to follow commands. Breath sounds are coarse and diminished. Trachea is midline, Negative for subcutaneous air, and chest excursion is symmetric. Patient is also Negative for cyanosis and is Negative for pitting edema. All alarms are set and audible. Resuscitation bag is at the head of the bed.       Ventilator Settings  Mode FIO2 Rate Tidal Volume Pressure PEEP I:E Ratio   SIMV, Pressure support, PRVC  35 %   12 400 ml  8 cm H2O  8 cm H20  1:2      Peak airway pressure: 16.9 cm H2O   Minute ventilation: 8.5 l/min         Whit Brown, RT

## 2020-10-08 NOTE — PROGRESS NOTES
Pharmacokinetic Consult to Pharmacist    [place] Parents is a 56 y.o. female being treated for sepsis with vancomycin and zosyn. Patient was started on vancomycin and zosyn on 10/3. Vancomycin was stopped on 10/5, but restarted 10/6 due to continued fevers. Height: 5' 3\" (160 cm)  Weight: 78.9 kg (173 lb 15.1 oz)  Lab Results   Component Value Date/Time    BUN 53 (H) 10/08/2020 02:58 AM    Creatinine 2.60 (H) 10/08/2020 02:58 AM    WBC 25.6 (H) 10/08/2020 02:58 AM    Procalcitonin 4.20 10/07/2020 06:39 AM      Estimated Creatinine Clearance: 22.3 mL/min (A) (based on SCr of 2.6 mg/dL (H)). CULTURES:  10/7 Blood cx: NG  10/6: Sputum cx: NG  10/2 Blood cx: NG  10/2 Urine cx: NG  10/2 Sputum cx: NG    Lab Results   Component Value Date/Time    Vancomycin, random 11.3 10/08/2020 02:58 AM       Day 3 of vancomycin (restarted), day 6 overall. Goal trough is 15-20 mcg/ml. Random level this AM = 11.3 mcg/ml. Will dose vancomycin 1 gram x 1. Repeat random level with AM labs on 10/9. Will continue to follow patient.       Thank you,  Brianna Hernandez, PharmD, BCCCP    [facility]  [email or web address]

## 2020-10-08 NOTE — PROGRESS NOTES
Comprehensive Nutrition Assessment    Type and Reason for Visit: Reassess(TF management (cardio surgery))    Nutrition Recommendations/Plan:   Advance diet as medically appropriate/per SLP vs replace NGT and resume TF. Will keep TF order active in the event that TF is resumed, but stop PSTF as patient is now off the vent and Glucerna 1.5 @ 45 ml/hr will provide 89 g pro (100% needs). Nutrition Assessment:  Patient admitted for CABG. She has a PMH significant for HTNm CHF, HLD, afib, NSTEMI. S/p CABG 10/1. Patient extubated this am. She is pleasantly confused. She does not know if she is hungry. She needs to be redirected and provides no meaningful history. RN reports patient dislodged NGT today. SLP was consulted, but order canceled per Dr. Sherine Cardoza. RN unsure if/when diet to be advanced. Unsure if NGT to be replaced. Labs reviewed and significant for K 3.4 (replaced this am), Glucose 282  Medications reviewed: Lasix stopped  Abdomen: hyperactive BS, last BM today  Edema: 1+ all extremities, general    Estimated Daily Nutrient Needs:  Energy (kcal):  2095-2091(34-90 kcal/kg (85.4kg))  Protein (g):  79-99(1-1.2 g/kg (78.9 kg) post op wound healing)       Fluid (ml/day):  ~1.2 L(per nephrology)    Wounds:    Surgical wound       Current Nutrition Therapies:  DIET NPO  DIET TUBE FEEDING Open order for details. Give 2 packets Prosource TF at 9A with 50 ml water flush before and after. Hold tube feed if MAP less than 65. Keep HOB > 30 degrees. Check residuals every 4 hours. Hold TF for > 500 ml x 1 or 250 ml x 2 c... Tube feed on hold    Anthropometric Measures:  · Height:  5' 3\" (160 cm)  · Current Body Wt:  78.9 kg (173 lb 15.1 oz)(bed scale)   · Admission Body Wt:  181 lb 7 oz(standing scale)    Body mass index is 30.81 kg/m². · BMI Category:  Obese class 1 (BMI 30.0-34. 9)       Nutrition Diagnosis:   · Inadequate oral intake related to (medical status) as evidenced by (s/p extubation, still NPO, no TF access)    Nutrition Interventions:   Food and/or Nutrient Delivery: Modify tube feeding(Advance diet as medically appropriate/per SLP)  Coordination of Nutrition Care: (Disccussed with Lazaro Weller RN)    Goals:  Continue to meet at least 75% nutrition needs with EN       Nutrition Monitoring and Evaluation:   Food/Nutrient Intake Outcomes: Diet advancement/tolerance  Physical Signs/Symptoms Outcomes: Biochemical data, GI status    Discharge Planning:     Too soon to determine     [address] [place], LD on 10/8/2020 at 11:35 AM  Contact: [phone]

## 2020-10-08 NOTE — PROGRESS NOTES
Problem: Mobility Impaired (Adult and Pediatric)  Goal: *Acute Goals and Plan of Care (Insert Text)  Outcome: Progressing Towards Goal  Note: LTG:  (1.)Ms. Daly will move from supine to sit and sit to supine , scoot up and down, and roll side to side with INDEPENDENT within 7 treatment day(s). (2.)Ms. Daly will transfer from bed to chair and chair to bed with INDEPENDENT using the least restrictive device within 7 treatment day(s). (3.)Ms. Daly will ambulate with INDEPENDENT for 500+ feet with the least restrictive device within 7 treatment day(s) while maintaining normal vital signs. (4.)Ms. Daly will perform 3-4 steps with HR and SBA within 7 treatment days for safety ascending and descending stairs. (5.)Ms. Daly will be independent in HEP for B LE strength and mobility within 7 treatment days.    ___________________________________________________________________________________________      PHYSICAL THERAPY: Initial Assessment and AM 10/8/2020  INPATIENT: PT Visit Days : 1  Payor: Irma Diaz / Plan: [facility] / Product Type: PPO /       NAME/AGE/GENDER: Bela Ann is a 56 y.o. female   PRIMARY DIAGNOSIS: Atherosclerosis of native coronary artery with unstable angina pectoris, unspecified whether native or transplanted heart (HCC) [I25.110]  Paroxysmal A-fib ([facility]) [I48.0]  Paroxysmal A-fib ([facility] [I48.0]  Atherosclerosis of native coronary artery of native heart with unstable angina pectoris ([facility] [I25.110]  CAD (coronary artery disease) [I25.10]  Paroxysmal atrial fibrillation (HCC) [I48.0] S/P CABG x 4 S/P CABG x 4  Procedure(s) (LRB):  BRONCHOSCOPY ROOM 104 (N/A)  BRONCHIAL ALVEOLAR LAVAGE (N/A)  6 Days Post-Op  ICD-10: Treatment Diagnosis:    Generalized Muscle Weakness (M62.81)  Difficulty in walking, Not elsewhere classified (R26.2)   Precaution/Allergies:  Influenza virus vaccines      ASSESSMENT:     Ms. Shannon Leo presents with decreased bed mobility, transfers, ambulation, balance, activity tolerance, strength and overall general functional mobility s/p hospital admission with above, now CABG x 4 on 20. Pt with complications post surgery, extubated this am 10/8/20, now on airvo, seen in CVICU. Pt A & O but appears drowsy, difficult to follow commands at times, stay on task. Prior to surgery, pt works as hospice RN, independent at baseline. Pt lives alone per chart, pt states \"in-laws are in and out\", spouse . Pt in chair on arrival, RN present at bedside, pt with BM noted in chair. Pt required min A for transfers, fair static standing balance for cleaning. Pt then HHA and MIN A 3-4' to [facility]. Pt required CGA in sitting due to safety concerns weight shifting, balance. Pt then MIN A for sit to stand, fair static for cleaning, cues for posture, weight shifting. Pt then min A x 2 for safety from [facility] to chair, pt fatigued with mobility. Pt required mod/max A x 2 for positioning in chair. Left with RN attending to further pt care needs. PT to follow for acute care needs to address deficits noted above. Anticipate pt to do well with activity due to prior level of function of independent.        This section established at most recent assessment   PROBLEM LIST (Impairments causing functional limitations):  Decreased Strength  Decreased ADL/Functional Activities  Decreased Transfer Abilities  Decreased Ambulation Ability/Technique  Decreased Balance  Decreased Activity Tolerance  Decreased Anderson with Home Exercise Program  Decreased Cognition   INTERVENTIONS PLANNED: (Benefits and precautions of physical therapy have been discussed with the patient.)  Balance Exercise  Bed Mobility  Family Education  Gait Training  Home Exercise Program (HEP)  Therapeutic Activites  Therapeutic Exercise/Strengthening  Transfer Training     TREATMENT PLAN: Frequency/Duration: twice daily for duration of hospital stay  Rehabilitation Potential For Stated Goals: Good     REHAB RECOMMENDATIONS (at time of discharge pending progress):    Placement: It is my opinion, based on this patient's performance to date, that Ms. Daly may benefit from participating in 1-2 additional therapy sessions in order to continue to assess for rehab potential and then make recommendation for disposition at discharge. Equipment:   None at this time              HISTORY:   History of Present Injury/Illness (Reason for Referral):  S/p CABG x 4, see H & P  Past Medical History/Comorbidities:   Ms. Karen Colvin  has a past medical history of Anxiety, Borderline diabetes, Endometrial cancer ([facility] Essential hypertension (9/18/2020), Heart failure ([facility] History of anemia, Mixed hyperlipidemia (9/18/2020), Paroxysmal atrial fibrillation ([facility] (9/18/2020), and Sleep apnea. She also has no past medical history of Difficult intubation, Malignant hyperthermia due to anesthesia, Nausea & vomiting, or Pseudocholinesterase deficiency. Ms. Karne Colvin  has a past surgical history that includes hx hysterectomy; hx heart catheterization (09/18/2020); hx cyst removal; hx cyst removal; and hx orthopaedic. Social History/Living Environment:   Home Environment: Private residence  # Steps to Enter: 0  One/Two Story Residence: Two story, live on 1st floor  Living Alone: Yes  Support Systems: Child(licha), Family member(s)  Patient Expects to be Discharged to[de-identified] Private residence  Current DME Used/Available at Home: CPAP  Prior Level of Function/Work/Activity:  Lives alone, independent at baseline  Personal Factors:          Sex:  female        Age:  56 y.o.         Overall Behavior:  agreeable, confused   Number of Personal Factors/Comorbidities that affect the Plan of Care:  Cancer, DM  1-2: MODERATE COMPLEXITY   EXAMINATION:   Most Recent Physical Functioning:   Gross Assessment:  AROM: Within functional limits(B LE)  Strength: Generally decreased, functional(B LE)  Coordination: Generally decreased, functional Posture:  Posture (WDL): Exceptions to WDL  Posture Assessment: Rounded shoulders  Balance:  Sitting: Impaired  Sitting - Static: Good (unsupported)  Sitting - Dynamic: Fair (occasional)  Standing: Impaired  Standing - Static: Fair  Standing - Dynamic : Constant support; Fair Bed Mobility:  Supine to Sit: (up in chair)  Sit to Supine: (up in chair)  Scooting: Moderate assistance;Maximum assistance(to position in chair)  Wheelchair Mobility:     Transfers:  Sit to Stand: Minimum assistance  Stand to Sit: Minimum assistance  Gait:     Base of Support: Widened  Speed/Lila: Shuffled; Slow  Step Length: Left shortened;Right shortened  Swing Pattern: Left symmetrical;Right symmetrical  Gait Abnormalities: Decreased step clearance;Shuffling gait  Distance (ft): 4 Feet (ft)(chair to [facility], BSC to chair)  Assistive Device: (HHA and close contact x 2 for safety)  Ambulation - Level of Assistance: Minimal assistance  Interventions: Tactile cues; Verbal cues      Body Structures Involved:  Heart  Lungs  Muscles Body Functions Affected:  Cardio  Respiratory  Movement Related Activities and Participation Affected:  General Tasks and Demands  Mobility  Self Care   Number of elements that affect the Plan of Care: 4+: HIGH COMPLEXITY   CLINICAL PRESENTATION:   Presentation: Evolving clinical presentation with changing clinical characteristics: MODERATE COMPLEXITY   CLINICAL DECISION MAKIN [facility] Inpatient Short Form  How much difficulty does the patient currently have. .. Unable A Lot A Little None   1. Turning over in bed (including adjusting bedclothes, sheets and blankets)? [] 1   [x] 2   [] 3   [] 4   2. Sitting down on and standing up from a chair with arms ( e.g., wheelchair, bedside commode, etc.)   [] 1   [x] 2   [] 3   [] 4   3. Moving from lying on back to sitting on the side of the bed?    [] 1   [x] 2   [] 3   [] 4   How much help from another person does the patient currently need. .. Total A Lot A Little None   4. Moving to and from a bed to a chair (including a wheelchair)? [] 1   [] 2   [x] 3   [] 4   5. Need to walk in hospital room? [] 1   [] 2   [x] 3   [] 4   6. Climbing 3-5 steps with a railing? [] 1   [x] 2   [] 3   [] 4   © 2007, Trustees of [address] [place] [address] [address] [id number], under license to [employer]. All rights reserved      Score:  Initial: 14 Most Recent: X (Date: -- )    Interpretation of Tool:  Represents activities that are increasingly more difficult (i.e. Bed mobility, Transfers, Gait). Medical Necessity:     Patient is expected to demonstrate progress in   strength, range of motion, balance, and coordination   to   decrease assistance required with overall functional mobility, transfers, ambulation  . Patient demonstrates   good   rehab potential due to higher previous functional level. Reason for Services/Other Comments:  Patient   continues to require present interventions due to patient's inability to perform bed mobility, transfers, ambulation safely and effectively at prior level of function of independent. .   Use of outcome tool(s) and clinical judgement create a POC that gives a: Clear prediction of patient's progress: LOW COMPLEXITY            TREATMENT:   (In addition to Assessment/Re-Assessment sessions the following treatments were rendered)   Pre-treatment Symptoms/Complaints:  \"I just can't help it\" (regarding BM)  Pain: Initial:   Pain Intensity 1: 0  Post Session:  0/10     Therapeutic Activity: (    8 min): Therapeutic activities including Chair transfers, Toilet transfers, Ambulation on level ground, and walker safety, posture to improve mobility, strength, balance, and coordination. Required minimal Tactile cues; Verbal cues to promote static and dynamic balance in standing and promote coordination of bilateral, lower extremity(s).      Braces/Orthotics/Lines/Etc:   IV  will catheter  ICU lines and monitors   O2 Device: Yazmin Traore  Treatment/Session Assessment:    Response to Treatment:  tolerated mobility well, fatigued   Interdisciplinary Collaboration:   Physical Therapist  Registered Nurse  After treatment position/precautions:   Up in chair  Bed/Chair-wheels locked  Bed in low position  Call light within reach  Nurse at bedside   Compliance with Program/Exercises: Will assess as treatment progresses  Recommendations/Intent for next treatment session: \"Next visit will focus on advancements to more challenging activities\".   Total Treatment Duration:  PT Patient Time In/Time Out  Time In: 0923  Time Out: CHAU Kaba

## 2020-10-08 NOTE — PROGRESS NOTES
Stand completed by RN. Patient tolerated water by mouth. Ok to give oral meds per MD. Pt will not tolerate dobhoff or Feeding tube. Speech eval ordered for tomorrow.

## 2020-10-08 NOTE — PROGRESS NOTES
Infectious Disease Progress Note    Today's Date: 10/8/2020   Admit Date: 2020    Impression:   · Fevers and leukocytosis  · Acute respiratory failure, s/p emergent intubation and bronch 10/2, culture negative; viral, fungal, atypical pending  · YURI, nephrology following  · CABG x 4     Plan:   · Continue cefepime and vancomycin per pharm dosing. · Follow fever curve and leukocytosis. Overall she has improved. Anti-infectives:   · Cefazolin ( -)  · Ceftriaxone (10/2-10/3)  · Zosyn (10/3 -   · vancomycin (10/3-10/5, 10/7-     Subjective: Interval History: Tmax 101.8 yesterday afternoon, trending down overnight, WBCs rising, 25.6k, CXR improved, doppler studies for DVT negative all extremities. She was extubated this morning, and is sitting in chair; wound vac stopped functioning and sternal wound is covered. Patient is mildly confused, pleasant, and engaged in care. Allergies   Allergen Reactions    Influenza Virus Vaccines Other (comments)     Gives her the flu. Review of Systems:  Review of systems not obtained due to patient factors.  as noted above    Objective:     Visit Vitals  BP (!) 154/67   Pulse 66   Temp 98.6 °F (37 °C)   Resp 23   Ht 5' 3\" (1.6 m)   Wt 78.9 kg (173 lb 15.1 oz)   SpO2 95%   BMI 30.81 kg/m²     Temp (24hrs), Av.4 °F (38 °C), Min:98.6 °F (37 °C), Max:101.8 °F (38.8 °C)     General: Alert, no acute distress, appears stated age, overweight  Head:    Normocephalic, atraumatic  Eyes:   Anicteric sclerae, no drainage, not injected, EOMI, small laceration above L eye  Mouth:  Moist mucosa, op clear  Neck:   Supple, symmetrical, trachea midline, no JVD  Lungs:   Clear anterior lungs  CV:   Regular rate and rhythm without audible murmur; sternal incision with sutures, no active drainage, covered with clean dressing  Abdomen:  Soft, non tender, not distended, active bowel sounds  Extremities:  Trace LE edema; LLE incisions with mild erythema around the sites, no drainage  Pulses:  2+ DP bilaterally  Musculoskeletal: Moves all extremities with equal strength, no deformity  Skin:   No acute rash  Psych:   Alert, appropriate without evidence of thought disorder  Lines:    benign    Data Review:     CBC:  Recent Labs     10/08/20  0258 10/07/20  0302 10/06/20  0329   WBC 25.6* 22.1* 17.0*   HGB 10.5* 9.7* 8.8*   HCT 32.1* 29.3* 26.8*    299 248       BMP:  Recent Labs     10/08/20  0258 10/07/20  0302 10/06/20  0329   CREA 2.60* 2.70* 2.05*   BUN 53* 44* 38*    142 143   K 3.4* 3.7 3.3*    102 107   CO2 36* 36* 30   AGAP 3* 4* 6*   * 193* 219*       LFTS:  No results for input(s): TBILI, ALT, AP, TP, ALB in the last 72 hours. No lab exists for component: SGOT    Microbiology:     All Micro Results     Procedure Component Value Units Date/Time    CULTURE, BLOOD [[id number]] Collected:  10/07/20 1312    Order Status:  Completed Specimen:  Blood Updated:  10/08/20 0907     Special Requests: --        LEFT  HAND       Culture result: NO GROWTH AFTER 19 HOURS       CULTURE, BLOOD [[id number]] Collected:  10/07/20 1406    Order Status:  Completed Specimen:  Blood Updated:  10/08/20 0907     Special Requests: --        LEFT  HAND       Culture result: NO GROWTH AFTER 18 HOURS       ATYPICAL PNEUMONIA BY PCR,BRONCHOALVEOLAR LAVAGE - INCLUDES: MYCOPLASMA PNEUMONIAE AND CHLAMYDIA PNEUMONIAE [[id number]] Collected:  10/02/20 1245    Order Status:  Completed Specimen:  Bronchial lavage Updated:  10/08/20 0737     M. pneumoniae Not Detected        C. pneumonaie Not Detected        Comment: (NOTE)  This test was developed and its performance  characteristics determined by [facility] [facility]. It has not  been cleared or approved by the U.S. Food and Drug  Administration. Results should be used in conjunction with  clinical findings, and should not form the sole basis for  a diagnosis or treatment decision.   Performed At: [address] [place], [place] [id number]  Hernandezbella Cardoso PhD ZF:[id number]         L. PNEUMOPHILA BY Lida Boone [[id number]] Collected:  10/02/20 1245    Order Status:  Completed Specimen:  Bronchial lavage Updated:  10/08/20 0737     L. pneumophila Not Detected        Comment: (NOTE)  This test was developed and its performance  characteristics determined by [facility]. It has not  been cleared or approved by the U.S. Food and Drug  Administration. Results should be used in conjunction with  clinical findings, and should not form the sole basis for  a diagnosis or treatment decision.   Performed At: [facility] [employer] [place], [place] [id number]  Mary Cardoso PhD HE:[id number]          Alva Legionella Not Detected       CULTURE, RESPIRATORY/SPUTUM/BRONCH Travis Layton [[id number]] Collected:  10/06/20 8997    Order Status:  Completed Specimen:  Sputum from Endotracheal aspirate Updated:  10/08/20 0720     Special Requests: NO SPECIAL REQUESTS        GRAM STAIN 5 TO 25 WBCS SEEN PER OIF      NO EPITHELIAL CELLS SEEN         NO DEFINITE ORGANISM SEEN         3+ MUCUS PRESENT        Culture result: NO GROWTH 2 DAYS       FUNGUS CULTURE AND SMEAR Dorcas Morris [[id number]] Collected:  10/02/20 1245    Order Status:  Completed Specimen:  Other from Miscellaneous sample Updated:  10/07/20 1436     Source BRONCHIAL LAVAGE        Fungus stain Direct Inoculation     Fungus (Mycology) Culture Other source received     Comment: (NOTE)  Performed At: [address] [id number]  Lovely Zuniga MD SK:[id number]         RESPIRATORY VIRAL PANEL, PCR [[id number]] Collected:  10/02/20 1245    Order Status:  Completed Specimen:  Sputum from Respiratory sample Updated:  10/07/20 1237     Source BRONCHIAL LAVAGE        Influenza A Negative        Influenza B Negative        RSV A Negative        RSV B Negative        Parainfluenza 1 Negative Parainfluenza 2 Negative        Parainfluenza 3 Negative        Rhinovirus Negative        Metapneumovirus Negative        Adenovirus Negative        Comment: (NOTE)  [facility] test number [id number] Respiratory Virus Profile (RVP), PCR will  become non-orderable on 11/02/2020. See CiR[facility] for alternatives.   Performed At: [address] [id number]  Lovely Zuniga MD IP:[id number]         CULTURE, BLOOD [[id number]] Collected:  10/02/20 0734    Order Status:  Completed Specimen:  Blood Updated:  10/07/20 0834     Special Requests: --        LEFT  HAND       Culture result: NO GROWTH 5 DAYS       CULTURE, BLOOD [[id number]] Collected:  10/02/20 0728    Order Status:  Completed Specimen:  Blood Updated:  10/07/20 0834     Special Requests: --        RIGHT  HAND       Culture result: NO GROWTH 5 DAYS       CULTURE, RESPIRATORY/SPUTUM/BRONCH Travis Calin [[id number]] Collected:  10/02/20 1245    Order Status:  Completed Specimen:  Sputum from Bronchial lavage Updated:  10/04/20 1015     Special Requests: NO SPECIAL REQUESTS        GRAM STAIN 0 TO 15 WBC'S SEEN PER OIF      NO EPITHELIAL CELLS SEEN         NO DEFINITE ORGANISM SEEN        Culture result: NO GROWTH 2 DAYS       CULTURE, URINE [[id number]] Collected:  10/02/20 0900    Order Status:  Completed Specimen:  Cath Urine Updated:  10/04/20 0714     Special Requests: NO SPECIAL REQUESTS        Culture result: NO GROWTH 2 DAYS       AFB (MYCOBACTERIUM) CULTURE & SMEAR W/REFLEX ID Devere Crimes NOCARDIA [[id number]] Collected:  10/02/20 1245    Order Status:  Completed Specimen:  Miscellaneous sample Updated:  10/03/20 1739     Source BRONCHIAL LAVAGE        AFB Specimen processing Concentration     Acid Fast Smear Negative        Comment: (NOTE)  Performed At: [address] [id number]  Lovely Zuniga MD TO:[id number]          Acid Fast Culture PENDING          Imaging: CXR 10/7/2020  IMPRESSION: Improving pulmonary edema and pleural effusions.   Signed By: Keri Juares NP     October 8, 2020

## 2020-10-08 NOTE — PROGRESS NOTES
Respiratory Mechanics completed and are as follows:  Weaning Parameters  Spontaneous Breathing Trial Complete: Yes  Resp Rate Observed: 22  Ve: 7.8  VT: 453  RSBI: 48  NIF: -20  Dawn Agitation Sedation Scale (RASS): Restless  Patient extubated to Airvo with 40L flow and 40% FiO2. Patient is able to communicate and is negative for stridor. Breath sounds are clear and diminished. No complications with extubation.      Ramesh Robin, RT

## 2020-10-08 NOTE — PROGRESS NOTES
[facility]    Follow-Up on: YURI on CKD    HPI: Pt extubated and talking.  Seen And examined    ROS:  UTO    Current Facility-Administered Medications   Medication Dose Route Frequency    insulin glargine (LANTUS) injection 25 Units  25 Units SubCUTAneous QHS    cefepime (MAXIPIME) 1 g in 0.9% sodium chloride (MBP/ADV) 50 mL  1 g IntraVENous Q24H    hydrALAZINE (APRESOLINE) 20 mg/mL injection 20 mg  20 mg IntraVENous Q4H PRN    amiodarone (CORDARONE) 450 mg in dextrose 5% 250 mL infusion  0.5-1 mg/min IntraVENous TITRATE    dexmedeTOMidine (PRECEDEX) 400 mcg in 0.9% sodium chloride 100 mL infusion  0.2-0.7 mcg/kg/hr IntraVENous TITRATE    Vancomycin Intermittent Dosing   Other Rx Dosing/Monitoring    sodium chloride (NS) flush 30 mL  30 mL InterCATHeter Q8H    sodium chloride (NS) flush 30 mL  30 mL InterCATHeter PRN    LORazepam (ATIVAN) tablet 1 mg  1 mg Oral Q4H PRN    pantoprazole (PROTONIX) 40 mg in 0.9% sodium chloride 10 mL injection  40 mg IntraVENous Q24H    sodium chloride (NS) flush 5-40 mL  5-40 mL IntraVENous Q8H    sodium chloride (NS) flush 5-40 mL  5-40 mL IntraVENous PRN    midazolam (VERSED) injection 2 mg  2 mg IntraVENous Multiple    NUTRITIONAL SUPPORT ELECTROLYTE PRN ORDERS   Does Not Apply PRN    senna-docusate (PERICOLACE) 8.6-50 mg per tablet 2 Tab  2 Tab Oral BID    LORazepam (ATIVAN) injection 1 mg  1 mg IntraVENous Q4H PRN    traMADoL (ULTRAM) tablet 100 mg  100 mg Oral Q6H PRN    oxyCODONE-acetaminophen (PERCOCET) 5-325 mg per tablet 1 Tab  1 Tab Oral Q4H PRN    insulin regular (NOVOLIN R, HUMULIN R) injection   SubCUTAneous AC&HS    alcohol 62% (NOZIN) nasal  1 Ampule  1 Ampule Topical Q12H    rosuvastatin (CRESTOR) tablet 40 mg  40 mg Oral QHS    sodium chloride (NS) flush 5-40 mL  5-40 mL IntraVENous Q8H    sodium chloride (NS) flush 5-40 mL  5-40 mL IntraVENous PRN    naloxone (NARCAN) injection 0.4 mg  0.4 mg IntraVENous PRN    sodium bicarbonate (8.4%) injection 50 mEq  50 mEq IntraVENous PRN    amiodarone (CORDARONE) tablet 200 mg  200 mg Oral Q12H    metoprolol tartrate (LOPRESSOR) tablet 25 mg  25 mg Oral Q12H    ondansetron (ZOFRAN) injection 4 mg  4 mg IntraVENous Q4H PRN    dextrose (D50W) injection syrg 12.5 g  25 mL IntraVENous PRN    aspirin chewable tablet 81 mg  81 mg Oral DAILY    magnesium sulfate 1 g/100 ml IVPB (premix or compounded)  1 g IntraVENous PRN    acetaminophen (TYLENOL) tablet 650 mg  650 mg Oral Q4H PRN       Exam:  Vitals:    10/08/20 1147 10/08/20 1148 10/08/20 1149 10/08/20 1150   BP:       Pulse: 79 78 78 78   Resp: 22 28 20 26   Temp:       SpO2: 97% 98% 98% 98%   Weight:       Height:             Intake/Output Summary (Last 24 hours) at 10/8/2020 1224  Last data filed at 10/8/2020 1152  Gross per 24 hour   Intake 2381.48 ml   Output 2250 ml   Net 131.48 ml     PE:  GEN - in no distress  CV - regular, no murmur, no rub  Lung - clear bilaterally  Abd - soft, nontender  Ext - no edema    Labs  Recent Labs     10/08/20  0258 10/07/20  0302 10/06/20  0329   WBC 25.6* 22.1* 17.0*   HGB 10.5* 9.7* 8.8*   HCT 32.1* 29.3* 26.8*    299 248     Recent Labs     10/08/20  0258 10/07/20  0302 10/06/20  0329    142 143   K 3.4* 3.7 3.3*    102 107   CO2 36* 36* 30   BUN 53* 44* 38*   CREA 2.60* 2.70* 2.05*   * 193* 219*   CA 8.7 8.7 7.2*   MG 2.9* 2.5* 2.1   PHOS  --  2.5  --      No results for input(s): PH, PCO2, PO2, PCO2 in the last 72 hours.     Problem List:  Patient Active Problem List    Diagnosis Date Noted    YURI (acute kidney injury) ([facility] 10/05/2020    Hypotension 10/03/2020    Acute respiratory failure with hypoxia ([facility] 10/02/2020    Bilateral pulmonary infiltrates on chest x-ray 10/02/2020    Pulmonary edema 10/01/2020    Anxiety 10/01/2020    AMOR on CPAP 09/30/2020    Paroxysmal A-fib ([facility] 09/29/2020    Atherosclerosis of native coronary artery of native heart with unstable angina pectoris ([facility] 09/29/2020    S/P CABG x 4 09/29/2020    Suspected sleep apnea 09/29/2020    CAD (coronary artery disease) 09/29/2020    Paroxysmal atrial fibrillation ([facility] 09/18/2020    Essential hypertension 09/18/2020    Mixed hyperlipidemia 09/18/2020    Uterine cancer ([facility] 09/18/2020    Chest pain 09/18/2020    Atrial fibrillation ([place] [id number] 09/18/2020       Issues Addressed By Nephrology:    Plan:  1. S/P CABG X4 V  2. Resp Failure  3. Pulmonary edema  4. YURI on CKD stable   5.  HTN prn IV antihypertensives  Continue supportive care

## 2020-10-09 LAB
ANION GAP SERPL CALC-SCNC: 7 MMOL/L (ref 7–16)
ANION GAP SERPL CALC-SCNC: 7 MMOL/L (ref 7–16)
BASOPHILS # BLD: 0.1 K/UL (ref 0–0.2)
BASOPHILS NFR BLD: 0 % (ref 0–2)
BUN SERPL-MCNC: 38 MG/DL (ref 8–23)
BUN SERPL-MCNC: 41 MG/DL (ref 8–23)
CALCIUM SERPL-MCNC: 8.5 MG/DL (ref 8.3–10.4)
CALCIUM SERPL-MCNC: 8.6 MG/DL (ref 8.3–10.4)
CHLORIDE SERPL-SCNC: 103 MMOL/L (ref 98–107)
CHLORIDE SERPL-SCNC: 103 MMOL/L (ref 98–107)
CO2 SERPL-SCNC: 30 MMOL/L (ref 21–32)
CO2 SERPL-SCNC: 31 MMOL/L (ref 21–32)
CREAT SERPL-MCNC: 1.66 MG/DL (ref 0.6–1)
CREAT SERPL-MCNC: 1.75 MG/DL (ref 0.6–1)
DIFFERENTIAL METHOD BLD: ABNORMAL
EOSINOPHIL # BLD: 0.3 K/UL (ref 0–0.8)
EOSINOPHIL NFR BLD: 1 % (ref 0.5–7.8)
ERYTHROCYTE [DISTWIDTH] IN BLOOD BY AUTOMATED COUNT: 15 % (ref 11.9–14.6)
GLUCOSE BLD STRIP.AUTO-MCNC: 155 MG/DL (ref 65–100)
GLUCOSE BLD STRIP.AUTO-MCNC: 176 MG/DL (ref 65–100)
GLUCOSE BLD STRIP.AUTO-MCNC: 225 MG/DL (ref 65–100)
GLUCOSE BLD STRIP.AUTO-MCNC: 270 MG/DL (ref 65–100)
GLUCOSE SERPL-MCNC: 164 MG/DL (ref 65–100)
GLUCOSE SERPL-MCNC: 175 MG/DL (ref 65–100)
HCT VFR BLD AUTO: 29.9 % (ref 35.8–46.3)
HGB BLD-MCNC: 9.9 G/DL (ref 11.7–15.4)
IMM GRANULOCYTES # BLD AUTO: 0.5 K/UL (ref 0–0.5)
IMM GRANULOCYTES NFR BLD AUTO: 2 % (ref 0–5)
LYMPHOCYTES # BLD: 1.5 K/UL (ref 0.5–4.6)
LYMPHOCYTES NFR BLD: 5 % (ref 13–44)
MAGNESIUM SERPL-MCNC: 2.6 MG/DL (ref 1.8–2.4)
MCH RBC QN AUTO: 28.9 PG (ref 26.1–32.9)
MCHC RBC AUTO-ENTMCNC: 33.1 G/DL (ref 31.4–35)
MCV RBC AUTO: 87.4 FL (ref 79.6–97.8)
MONOCYTES # BLD: 1.7 K/UL (ref 0.1–1.3)
MONOCYTES NFR BLD: 6 % (ref 4–12)
NEUTS SEG # BLD: 23.5 K/UL (ref 1.7–8.2)
NEUTS SEG NFR BLD: 85 % (ref 43–78)
NRBC # BLD: 0 K/UL (ref 0–0.2)
PHOSPHATE SERPL-MCNC: 3.1 MG/DL (ref 2.3–3.7)
PLATELET # BLD AUTO: 452 K/UL (ref 150–450)
PMV BLD AUTO: 10.8 FL (ref 9.4–12.3)
POTASSIUM SERPL-SCNC: 2.5 MMOL/L (ref 3.5–5.1)
POTASSIUM SERPL-SCNC: 3.5 MMOL/L (ref 3.5–5.1)
POTASSIUM SERPL-SCNC: 4.2 MMOL/L (ref 3.5–5.1)
RBC # BLD AUTO: 3.42 M/UL (ref 4.05–5.2)
SODIUM SERPL-SCNC: 140 MMOL/L (ref 136–145)
SODIUM SERPL-SCNC: 141 MMOL/L (ref 136–145)
VANCOMYCIN SERPL-MCNC: 13.1 UG/ML
WBC # BLD AUTO: 27.6 K/UL (ref 4.3–11.1)

## 2020-10-09 PROCEDURE — 65660000004 HC RM CVT STEPDOWN

## 2020-10-09 PROCEDURE — 80048 BASIC METABOLIC PNL TOTAL CA: CPT

## 2020-10-09 PROCEDURE — 2709999900 HC NON-CHARGEABLE SUPPLY

## 2020-10-09 PROCEDURE — 92610 EVALUATE SWALLOWING FUNCTION: CPT

## 2020-10-09 PROCEDURE — 74011250637 HC RX REV CODE- 250/637: Performed by: PHYSICIAN ASSISTANT

## 2020-10-09 PROCEDURE — 74011250636 HC RX REV CODE- 250/636: Performed by: INTERNAL MEDICINE

## 2020-10-09 PROCEDURE — 77030040393 HC DRSG OPTIFOAM GENT MDII -B

## 2020-10-09 PROCEDURE — 83735 ASSAY OF MAGNESIUM: CPT

## 2020-10-09 PROCEDURE — 74011636637 HC RX REV CODE- 636/637: Performed by: THORACIC SURGERY (CARDIOTHORACIC VASCULAR SURGERY)

## 2020-10-09 PROCEDURE — C9113 INJ PANTOPRAZOLE SODIUM, VIA: HCPCS | Performed by: INTERNAL MEDICINE

## 2020-10-09 PROCEDURE — 36592 COLLECT BLOOD FROM PICC: CPT

## 2020-10-09 PROCEDURE — 97530 THERAPEUTIC ACTIVITIES: CPT

## 2020-10-09 PROCEDURE — 74011250637 HC RX REV CODE- 250/637: Performed by: THORACIC SURGERY (CARDIOTHORACIC VASCULAR SURGERY)

## 2020-10-09 PROCEDURE — 82962 GLUCOSE BLOOD TEST: CPT

## 2020-10-09 PROCEDURE — 80202 ASSAY OF VANCOMYCIN: CPT

## 2020-10-09 PROCEDURE — 77030012890

## 2020-10-09 PROCEDURE — 84132 ASSAY OF SERUM POTASSIUM: CPT

## 2020-10-09 PROCEDURE — 97110 THERAPEUTIC EXERCISES: CPT

## 2020-10-09 PROCEDURE — 84100 ASSAY OF PHOSPHORUS: CPT

## 2020-10-09 PROCEDURE — 99232 SBSQ HOSP IP/OBS MODERATE 35: CPT | Performed by: INTERNAL MEDICINE

## 2020-10-09 PROCEDURE — 74011250636 HC RX REV CODE- 250/636: Performed by: THORACIC SURGERY (CARDIOTHORACIC VASCULAR SURGERY)

## 2020-10-09 PROCEDURE — 74011250637 HC RX REV CODE- 250/637: Performed by: INTERNAL MEDICINE

## 2020-10-09 PROCEDURE — 85025 COMPLETE CBC W/AUTO DIFF WBC: CPT

## 2020-10-09 PROCEDURE — 74011000250 HC RX REV CODE- 250: Performed by: INTERNAL MEDICINE

## 2020-10-09 PROCEDURE — 74011636637 HC RX REV CODE- 636/637: Performed by: PHYSICIAN ASSISTANT

## 2020-10-09 RX ORDER — MAG HYDROX/ALUMINUM HYD/SIMETH 200-200-20
30 SUSPENSION, ORAL (FINAL DOSE FORM) ORAL
Status: DISCONTINUED | OUTPATIENT
Start: 2020-10-09 | End: 2020-10-12 | Stop reason: HOSPADM

## 2020-10-09 RX ORDER — SAME BUTANEDISULFONATE/BETAINE 400-600 MG
500 POWDER IN PACKET (EA) ORAL 2 TIMES DAILY
Status: DISCONTINUED | OUTPATIENT
Start: 2020-10-09 | End: 2020-10-12 | Stop reason: HOSPADM

## 2020-10-09 RX ORDER — ADHESIVE BANDAGE
30 BANDAGE TOPICAL DAILY PRN
Status: DISCONTINUED | OUTPATIENT
Start: 2020-10-09 | End: 2020-10-12 | Stop reason: HOSPADM

## 2020-10-09 RX ORDER — PANTOPRAZOLE SODIUM 40 MG/1
40 TABLET, DELAYED RELEASE ORAL
Status: DISCONTINUED | OUTPATIENT
Start: 2020-10-10 | End: 2020-10-12 | Stop reason: HOSPADM

## 2020-10-09 RX ORDER — LANOLIN ALCOHOL/MO/W.PET/CERES
400 CREAM (GRAM) TOPICAL
Status: DISCONTINUED | OUTPATIENT
Start: 2020-10-09 | End: 2020-10-12 | Stop reason: HOSPADM

## 2020-10-09 RX ORDER — LISINOPRIL 20 MG/1
20 TABLET ORAL DAILY
Status: DISCONTINUED | OUTPATIENT
Start: 2020-10-09 | End: 2020-10-10

## 2020-10-09 RX ORDER — INSULIN GLARGINE 100 [IU]/ML
28 INJECTION, SOLUTION SUBCUTANEOUS
Status: DISCONTINUED | OUTPATIENT
Start: 2020-10-09 | End: 2020-10-12 | Stop reason: HOSPADM

## 2020-10-09 RX ORDER — POTASSIUM CHLORIDE 14.9 MG/ML
20 INJECTION INTRAVENOUS
Status: DISPENSED | OUTPATIENT
Start: 2020-10-09 | End: 2020-10-09

## 2020-10-09 RX ORDER — POTASSIUM CHLORIDE 20 MEQ/1
40 TABLET, EXTENDED RELEASE ORAL
Status: DISCONTINUED | OUTPATIENT
Start: 2020-10-09 | End: 2020-10-12 | Stop reason: HOSPADM

## 2020-10-09 RX ORDER — POTASSIUM CHLORIDE 20 MEQ/1
20 TABLET, EXTENDED RELEASE ORAL
Status: DISCONTINUED | OUTPATIENT
Start: 2020-10-09 | End: 2020-10-12 | Stop reason: HOSPADM

## 2020-10-09 RX ORDER — INSULIN LISPRO 100 [IU]/ML
0-10 INJECTION, SOLUTION INTRAVENOUS; SUBCUTANEOUS
Status: DISCONTINUED | OUTPATIENT
Start: 2020-10-09 | End: 2020-10-12 | Stop reason: HOSPADM

## 2020-10-09 RX ADMIN — Medication 10 ML: at 17:11

## 2020-10-09 RX ADMIN — POTASSIUM CHLORIDE 20 MEQ: 200 INJECTION, SOLUTION INTRAVENOUS at 06:42

## 2020-10-09 RX ADMIN — HYDRALAZINE HYDROCHLORIDE 20 MG: 20 INJECTION, SOLUTION INTRAMUSCULAR; INTRAVENOUS at 04:56

## 2020-10-09 RX ADMIN — Medication 1 AMPULE: at 22:11

## 2020-10-09 RX ADMIN — METOPROLOL TARTRATE 25 MG: 25 TABLET, FILM COATED ORAL at 08:54

## 2020-10-09 RX ADMIN — RDII 250 MG CAPSULE 500 MG: at 11:29

## 2020-10-09 RX ADMIN — POTASSIUM CHLORIDE 20 MEQ: 200 INJECTION, SOLUTION INTRAVENOUS at 04:56

## 2020-10-09 RX ADMIN — AMIODARONE HYDROCHLORIDE 200 MG: 200 TABLET ORAL at 22:11

## 2020-10-09 RX ADMIN — INSULIN HUMAN 6 UNITS: 100 INJECTION, SOLUTION PARENTERAL at 11:35

## 2020-10-09 RX ADMIN — ACETAMINOPHEN 650 MG: 325 TABLET, FILM COATED ORAL at 17:05

## 2020-10-09 RX ADMIN — ROSUVASTATIN CALCIUM 40 MG: 20 TABLET, COATED ORAL at 22:11

## 2020-10-09 RX ADMIN — PANTOPRAZOLE SODIUM 40 MG: 40 INJECTION, POWDER, FOR SOLUTION INTRAVENOUS at 08:54

## 2020-10-09 RX ADMIN — Medication 30 ML: at 22:15

## 2020-10-09 RX ADMIN — Medication 30 ML: at 05:08

## 2020-10-09 RX ADMIN — INSULIN LISPRO 2 UNITS: 100 INJECTION, SOLUTION INTRAVENOUS; SUBCUTANEOUS at 22:20

## 2020-10-09 RX ADMIN — LISINOPRIL 10 MG: 20 TABLET ORAL at 12:49

## 2020-10-09 RX ADMIN — AMIODARONE HYDROCHLORIDE 200 MG: 200 TABLET ORAL at 08:54

## 2020-10-09 RX ADMIN — POTASSIUM CHLORIDE 20 MEQ: 200 INJECTION, SOLUTION INTRAVENOUS at 09:29

## 2020-10-09 RX ADMIN — INSULIN HUMAN 2 UNITS: 100 INJECTION, SOLUTION PARENTERAL at 09:30

## 2020-10-09 RX ADMIN — Medication 10 ML: at 05:08

## 2020-10-09 RX ADMIN — RDII 250 MG CAPSULE 500 MG: at 17:05

## 2020-10-09 RX ADMIN — ACETAMINOPHEN 650 MG: 325 TABLET, FILM COATED ORAL at 22:21

## 2020-10-09 RX ADMIN — ASPIRIN 81 MG: 81 TABLET, CHEWABLE ORAL at 08:54

## 2020-10-09 RX ADMIN — INSULIN LISPRO 4 UNITS: 100 INJECTION, SOLUTION INTRAVENOUS; SUBCUTANEOUS at 17:11

## 2020-10-09 RX ADMIN — LISINOPRIL 10 MG: 20 TABLET ORAL at 11:29

## 2020-10-09 RX ADMIN — Medication 1 AMPULE: at 08:54

## 2020-10-09 RX ADMIN — Medication 10 ML: at 22:16

## 2020-10-09 RX ADMIN — INSULIN GLARGINE 28 UNITS: 100 INJECTION, SOLUTION SUBCUTANEOUS at 22:11

## 2020-10-09 NOTE — PROGRESS NOTES
Problem: Mobility Impaired (Adult and Pediatric)  Goal: *Acute Goals and Plan of Care (Insert Text)  Outcome: Progressing Towards Goal  Note: LTG:  (1.)Ms. Daly will move from supine to sit and sit to supine , scoot up and down, and roll side to side with INDEPENDENT within 7 treatment day(s). (2.)Ms. Daly will transfer from bed to chair and chair to bed with INDEPENDENT using the least restrictive device within 7 treatment day(s). (3.)Ms. Daly will ambulate with INDEPENDENT for 500+ feet with the least restrictive device within 7 treatment day(s) while maintaining normal vital signs. (4.)Ms. Daly will perform 3-4 steps with HR and SBA within 7 treatment days for safety ascending and descending stairs. (5.)Ms. Daly will be independent in HEP for B LE strength and mobility within 7 treatment days.    ___________________________________________________________________________________________      PHYSICAL THERAPY: Daily Note and PM 10/9/2020  INPATIENT: PT Visit Days : 2  Payor: Jayden Mora / Plan: [facility] / Product Type: PPO /       NAME/AGE/GENDER: Katharina Zhu is a 56 y.o. female   PRIMARY DIAGNOSIS: Atherosclerosis of native coronary artery with unstable angina pectoris, unspecified whether native or transplanted heart (HCC) [I25.110]  Paroxysmal A-fib ([facility]) [I48.0]  Paroxysmal A-fib ([facility] [I48.0]  Atherosclerosis of native coronary artery of native heart with unstable angina pectoris ([facility] [I25.110]  CAD (coronary artery disease) [I25.10]  Paroxysmal atrial fibrillation (HCC) [I48.0] S/P CABG x 4 S/P CABG x 4  Procedure(s) (LRB):  BRONCHOSCOPY ROOM 104 (N/A)  BRONCHIAL ALVEOLAR LAVAGE (N/A)  7 Days Post-Op  ICD-10: Treatment Diagnosis:    · Generalized Muscle Weakness (M62.81)  · Difficulty in walking, Not elsewhere classified (R26.2)   Precaution/Allergies:  Influenza virus vaccines      ASSESSMENT:     Ms. Octavia Castro presents with decreased bed mobility, transfers, ambulation, balance, activity tolerance, strength and overall general functional mobility s/p hospital admission with above, now CABG x 4 on 20. Pt with complications post surgery, extubated  10/8/20  No O2 . Prior to surgery, pt works as hospice RN, independent at baseline. Pt lives alone per chart, pt states \"in-laws are in and out\", spouse . Pt in chair on arrival, RN present at bedside. Patient is agreeable to therapy. Requesting to get to the bathroom. Pt required min A for transfers to the [facility]. Stood with min assist for cleaning. Gait with the cardiac walker x 150 feet with slow but safe sandra. Patient is returned to the recliner and after a short rest break the patient is instructed in therapeutic exercises. Tolerated well. Patient is left sitting in the recliner with needs within reach with lap belt intact. PT to follow for acute care needs to address deficits noted above. Anticipate pt to do well with activity due to prior level of function of independent. Will continue PT efforts. PM note:  Patient has transferred to stepdown and is sitting in the recliner. Daughter present. Patient is agreeable to therapy. Sit to stand with min assist to the walker. Gait training with rolling walker x 150 feet with assist for walker management. Patient did require one sitting rest break. Patient is returned to the recliner with needs within reach and alarm intact. Good session. Patient is making progress towards goals. Will continue PT efforts. This section established at most recent assessment   PROBLEM LIST (Impairments causing functional limitations):  1. Decreased Strength  2. Decreased ADL/Functional Activities  3. Decreased Transfer Abilities  4. Decreased Ambulation Ability/Technique  5. Decreased Balance  6. Decreased Activity Tolerance  7. Decreased [place] with Home Exercise Program  8.  Decreased Cognition   INTERVENTIONS PLANNED: (Benefits and precautions of physical therapy have been discussed with the patient.)  1. Balance Exercise  2. Bed Mobility  3. Family Education  4. Gait Training  5. Home Exercise Program (HEP)  6. Therapeutic Activites  7. Therapeutic Exercise/Strengthening  8. Transfer Training     TREATMENT PLAN: Frequency/Duration: twice daily for duration of hospital stay  Rehabilitation Potential For Stated Goals: Good     REHAB RECOMMENDATIONS (at time of discharge pending progress):    Placement: It is my opinion, based on this patient's performance to date, that Ms. Daly may benefit from participating in 1-2 additional therapy sessions in order to continue to assess for rehab potential and then make recommendation for disposition at discharge. Equipment:    None at this time              HISTORY:   History of Present Injury/Illness (Reason for Referral):  S/p CABG x 4, see H & P  Past Medical History/Comorbidities:   Ms. Tien Lanza  has a past medical history of Anxiety, Borderline diabetes, Endometrial cancer ([facility] Essential hypertension (9/18/2020), Heart failure ([facility] History of anemia, Mixed hyperlipidemia (9/18/2020), Paroxysmal atrial fibrillation ([facility] (9/18/2020), and Sleep apnea. She also has no past medical history of Difficult intubation, Malignant hyperthermia due to anesthesia, Nausea & vomiting, or Pseudocholinesterase deficiency. Ms. Tien Lanza  has a past surgical history that includes hx hysterectomy; hx heart catheterization (09/18/2020); hx cyst removal; hx cyst removal; and hx orthopaedic.   Social History/Living Environment:   Home Environment: Private residence  # Steps to Enter: 0  One/Two Story Residence: Two story, live on 1st floor  Living Alone: Yes  Support Systems: Child(licha), Family member(s)  Patient Expects to be Discharged to[de-identified] Private residence  Current DME Used/Available at Home: CPAP  Prior Level of Function/Work/Activity:  Lives alone, independent at baseline  Personal Factors:          Sex: female        Age:  56 y.o. Overall Behavior:  agreeable, confused   Number of Personal Factors/Comorbidities that affect the Plan of Care:  Cancer, DM  1-2: MODERATE COMPLEXITY   EXAMINATION:   Most Recent Physical Functioning:   Gross Assessment:                  Posture:     Balance:  Sitting: Intact  Sitting - Static: Good (unsupported)  Sitting - Dynamic: Fair (occasional)  Standing: Impaired  Standing - Static: Fair  Standing - Dynamic : Fair Bed Mobility:     Wheelchair Mobility:     Transfers:  Sit to Stand: Minimum assistance  Stand to Sit: Minimum assistance  Gait:     Speed/Lila: Slow  Distance (ft): 150 Feet (ft)  Assistive Device: Walker, rolling  Ambulation - Level of Assistance: Minimal assistance  Interventions: Safety awareness training      Body Structures Involved:  1. Heart  2. Lungs  3. Muscles Body Functions Affected:  1. Cardio  2. Respiratory  3. Movement Related Activities and Participation Affected:  1. General Tasks and Demands  2. Mobility  3. Self Care   Number of elements that affect the Plan of Care: 4+: HIGH COMPLEXITY   CLINICAL PRESENTATION:   Presentation: Evolving clinical presentation with changing clinical characteristics: MODERATE COMPLEXITY   CLINICAL DECISION MAKIN [facility] Inpatient Short Form  How much difficulty does the patient currently have. .. Unable A Lot A Little None   1. Turning over in bed (including adjusting bedclothes, sheets and blankets)? [] 1   [x] 2   [] 3   [] 4   2. Sitting down on and standing up from a chair with arms ( e.g., wheelchair, bedside commode, etc.)   [] 1   [x] 2   [] 3   [] 4   3. Moving from lying on back to sitting on the side of the bed? [] 1   [x] 2   [] 3   [] 4   How much help from another person does the patient currently need. .. Total A Lot A Little None   4. Moving to and from a bed to a chair (including a wheelchair)? [] 1   [] 2   [x] 3   [] 4   5.   Need to walk in hospital room? [] 1   [] 2   [x] 3   [] 4   6. Climbing 3-5 steps with a railing? [] 1   [x] 2   [] 3   [] 4   © 2007, Trustees of [address] [place] [address] [address] [address], under license to [employer]. All rights reserved      Score:  Initial: 14 Most Recent: X (Date: -- )    Interpretation of Tool:  Represents activities that are increasingly more difficult (i.e. Bed mobility, Transfers, Gait). Medical Necessity:     · Patient is expected to demonstrate progress in   · strength, range of motion, balance, and coordination  ·  to   · decrease assistance required with overall functional mobility, transfers, ambulation  · .  · Patient demonstrates   · good  ·  rehab potential due to higher previous functional level. Reason for Services/Other Comments:  · Patient   · continues to require present interventions due to patient's inability to perform bed mobility, transfers, ambulation safely and effectively at prior level of function of independent. · .   Use of outcome tool(s) and clinical judgement create a POC that gives a: Clear prediction of patient's progress: LOW COMPLEXITY            TREATMENT:   (In addition to Assessment/Re-Assessment sessions the following treatments were rendered)   Pre-treatment Symptoms/Complaints:  \"I'm ready\"  Pain: Initial:   Pain Intensity 1: 3  Post Session:  3/10     Therapeutic Activity: (    12 min): Therapeutic activities including Chair transfers, Toilet transfers, Ambulation on level ground, and walker safety, posture to improve mobility, strength, balance, and coordination. Required minimal Safety awareness training to promote static and dynamic balance in standing and promote coordination of bilateral, lower extremity(s). Therapeutic Exercise: (  minutes):  Exercises per grid below to improve mobility, strength, balance and coordination. Required min verbal cues to perform exercises correctly. Progressed repetitions and complexity of movement as indicated.        Date:  10/09/20 Date:   Date:     ACTIVITY/EXERCISE AM PM AM PM AM PM   LAQ 10        Shoulder shrugs 10        Gluteal sets 10        marching 10        Ankle pumps 10        abduction 10                 B = bilateral; AA = active assistive; A = active; P = passive      Braces/Orthotics/Lines/Etc:   · monitors  Treatment/Session Assessment:    · Response to Treatment:  tolerated mobility well eager to get a shower  · Interdisciplinary Collaboration:   o Physical Therapy Assistant  o Registered Nurse  · After treatment position/precautions:   o Up in chair  o Bed alarm/tab alert on  o Bed/Chair-wheels locked  o Bed in low position  o Call light within reach  o RN notified  o Family at bedside   · Compliance with Program/Exercises: Will assess as treatment progresses  · Recommendations/Intent for next treatment session: \"Next visit will focus on advancements to more challenging activities\".   Total Treatment Duration:  PT Patient Time In/Time Out  Time In: 1440  Time Out: 1452    Jaclyn Moon PTA

## 2020-10-09 NOTE — PROGRESS NOTES
Bedside shift change report given to Denise Bruno RN (oncoming nurse) by Westley Maza RN (offgoing nurse). Report included the following information SBAR, Kardex, OR Summary, Intake/Output, MAR, Recent Results and Cardiac Rhythm NSR.

## 2020-10-09 NOTE — PROGRESS NOTES
A follow up visit was made to the patient. Emotional support, spiritual presence and   prayer were provided for the patient. She was alert and oriented. She was receptive to the 's visit and prayer. She expressed her appreciation for the visit.       LIV Medina

## 2020-10-09 NOTE — PROGRESS NOTES
Today's Date: 10/9/2020  Date of Admission: 9/29/2020    Chart Reviewed. Subjective:     Patient feels better today. She was able to walk with PT. Medications Reviewed. Objective:     Vitals:    10/09/20 0932 10/09/20 0958 10/09/20 1030 10/09/20 1059   BP: (!) 168/72 (!) 159/70 (!) 184/79 (!) 158/70   Pulse: 65 (!) 59 (!) 56 (!) 56   Resp: 16 20 20 (!) 39   Temp:       SpO2: 95% 96% 98% 98%   Weight:       Height:           Intake and Output  Current Shift: 10/09 0701 - 10/09 1900  In: -   Out: 200    Last 3 Shifts: 10/07 1901 - 10/09 0700  In: 3321.5 [P.O.:1080; I.V.:1557.5]  Out: 2300 [Urine:2300]    Physical Exam:  General: Well Developed, Obese, No Acute Distress, Alert & Oriented x 3, Appropriate mood  Neck: supple, no JVD  Heart: S1S2 with RRR without murmurs or gallops  Lungs: Clear throughout auscultation bilaterally  Abd: soft, nontender, nondistended, with good bowel sounds  Ext: no edema bilaterally  Sternal incision: clean, dry, and intact  Skin: warm and dry    LABS  Data Review:   Recent Labs     10/09/20  0857 10/09/20  0344 10/08/20  0258    141 145   K 3.5 2.5* 3.4*   MG  --  2.6* 2.9*   BUN 38* 41* 53*   CREA 1.66* 1.75* 2.60*   * 164* 282*   WBC  --  27.6* 25.6*   HGB  --  9.9* 10.5*   HCT  --  29.9* 32.1*   PLT  --  452* 337       Estimated Creatinine Clearance: 35.8 mL/min (A) (based on SCr of 1.66 mg/dL (H)).       Assessment/Plan:     Principal Problem:    S/P CABG x 4 (9/29/2020)  Was re-intubated 10/2, now extubated and on room air, afebrile, cultures neg to date, no DVT in upper or lower extremities, on IV antibiotics, ID following      Active Problems:    Paroxysmal atrial fibrillation (HCC) (9/18/2020)  In sinus rhythm currently, intermittent a-fib 10/7        Paroxysmal A-fib ([facility] (9/29/2020)    S/p MAZE, in sinus currently        Atherosclerosis of native coronary artery of native heart with unstable angina pectoris ([facility] (9/29/2020)          CAD (coronary artery disease) (9/29/2020)  S/p CABG        AMOR on CPAP (9/30/2020)       Pulmonary edema (10/1/2020)  Improved       Anxiety (10/1/2020)          Acute respiratory failure with hypoxia ([facility] (10/2/2020)  Extubated, resolved        Bilateral pulmonary infiltrates on chest x-ray (10/2/2020)  Chest xray improved, stopped lasix drip given worsening renal function       Hypotension (10/3/2020)  Resolved        YURI (acute kidney injury) ([facility] (10/5/2020)  Renal function much improved, nephrology following      DM  Was not on any meds at home, Hgb A1C 8.2 pre-op, on Lantus, continue to titrate as needed     Hypertension  BP elevated today, ACE-I resumed by nephrology      Steven Landers PA-C

## 2020-10-09 NOTE — PROGRESS NOTES
Critical Care Daily Progress Note: 10/9/2020  Admission Date: 9/29/2020     The patient's chart is reviewed and the patient is discussed with the staff. 55 y/o female Patient had CABG x 4 on 9/29. Currently is sedated in CV-ICU and orally intubated receiving  mechanical ventilation. Pt presented to the ER at [facility] on 9/18/2020 with chest pain and dyspnea. She was found to be in afib RVR. She was started on cardizem and admitted by cardiology. 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  She was extubated on day of surgery but reintubated 10/2  Subjective:      Extubated, awake and following commands  Off all drips  Using IS  Up in chair    Current Facility-Administered Medications   Medication Dose Route Frequency    potassium chloride 20 mEq in 100 ml IVPB  20 mEq IntraVENous Q2H    hydrocortisone (ANUSOL-HC) 2.5 % rectal cream   PeriANAL QID PRN    insulin glargine (LANTUS) injection 25 Units  25 Units SubCUTAneous QHS    cefepime (MAXIPIME) 1 g in 0.9% sodium chloride (MBP/ADV) 50 mL  1 g IntraVENous Q24H    hydrALAZINE (APRESOLINE) 20 mg/mL injection 20 mg  20 mg IntraVENous Q4H PRN    amiodarone (CORDARONE) 450 mg in dextrose 5% 250 mL infusion  0.5-1 mg/min IntraVENous TITRATE    Vancomycin Intermittent Dosing   Other Rx Dosing/Monitoring    sodium chloride (NS) flush 30 mL  30 mL InterCATHeter Q8H    sodium chloride (NS) flush 30 mL  30 mL InterCATHeter PRN    LORazepam (ATIVAN) tablet 1 mg  1 mg Oral Q4H PRN    pantoprazole (PROTONIX) 40 mg in 0.9% sodium chloride 10 mL injection  40 mg IntraVENous Q24H    midazolam (VERSED) injection 2 mg  2 mg IntraVENous Multiple    NUTRITIONAL SUPPORT ELECTROLYTE PRN ORDERS   Does Not Apply PRN    LORazepam (ATIVAN) injection 1 mg  1 mg IntraVENous Q4H PRN    traMADoL (ULTRAM) tablet 100 mg  100 mg Oral Q6H PRN    oxyCODONE-acetaminophen (PERCOCET) 5-325 mg per tablet 1 Tab  1 Tab Oral Q4H PRN    insulin regular (NOVOLIN R, HUMULIN R) injection   SubCUTAneous AC&HS    alcohol 62% (NOZIN) nasal  1 Ampule  1 Ampule Topical Q12H    rosuvastatin (CRESTOR) tablet 40 mg  40 mg Oral QHS    sodium chloride (NS) flush 5-40 mL  5-40 mL IntraVENous Q8H    sodium chloride (NS) flush 5-40 mL  5-40 mL IntraVENous PRN    naloxone (NARCAN) injection 0.4 mg  0.4 mg IntraVENous PRN    sodium bicarbonate (8.4%) injection 50 mEq  50 mEq IntraVENous PRN    amiodarone (CORDARONE) tablet 200 mg  200 mg Oral Q12H    metoprolol tartrate (LOPRESSOR) tablet 25 mg  25 mg Oral Q12H    ondansetron (ZOFRAN) injection 4 mg  4 mg IntraVENous Q4H PRN    dextrose (D50W) injection syrg 12.5 g  25 mL IntraVENous PRN    aspirin chewable tablet 81 mg  81 mg Oral DAILY    magnesium sulfate 1 g/100 ml IVPB (premix or compounded)  1 g IntraVENous PRN    acetaminophen (TYLENOL) tablet 650 mg  650 mg Oral Q4H PRN     Review of Systems    Less sob  No fever or chills    Objective:     Vitals:    10/09/20 0604 10/09/20 0615 10/09/20 0630 10/09/20 0645   BP: (!) 150/65      Pulse: 65 67 71 65   Resp: 23 9 28 22   Temp:       SpO2: 96% 95% 95% 97%   Weight:       Height:           Intake/Output Summary (Last 24 hours) at 10/9/2020 0702  Last data filed at 10/9/2020 0630  Gross per 24 hour   Intake 1620.93 ml   Output 1600 ml   Net 20.93 ml     Physical Exam:            EENMT:  Sclera clear, pupils equal, oral mucosa moist  Respiratory:  Less crackles  Cardiovascular:  RRR with no M,G,R;  Gastrointestinal:  soft with no tenderness; positive bowel sounds present  Musculoskeletal:  warm with no cyanosis, no lower extremity edema  Skin:  no jaundice or ecchymosis  Neurologic: no gross neuro deficits     Psychiatric:  Follow commands     CXR: improved infiltrates today          Ventilat  ABG:   Recent Labs     10/08/20  0345 10/07/20  1401 10/07/20  1229   PHI 7.52* 7.54* 7.56*   PCO2I 44.4 41.9 38.6   PO2I 81 64* 68*   HCO3I 35.9* 35.9* 34.3*     LAB  Recent Labs 10/09/20  0644 10/08/20  2043 10/08/20  1641 10/08/20  1007 10/07/20  2238   GLUCPOC 155* 154* 266* 191* 231*     Recent Labs     10/09/20  0344 10/08/20  0258 10/07/20  0302   WBC 27.6* 25.6* 22.1*   HGB 9.9* 10.5* 9.7*   HCT 29.9* 32.1* 29.3*   * 337 299     Recent Labs     10/09/20  0344 10/08/20  0258 10/07/20  0302    145 142   K 2.5* 3.4* 3.7    106 102   CO2 31 36* 36*   * 282* 193*   BUN 41* 53* 44*   CREA 1.75* 2.60* 2.70*   MG 2.6* 2.9* 2.5*   PHOS 3.1  --  2.5   CA 8.5 8.7 8.7     No results for input(s): LCAD, LAC in the last 72 hours.     Patient Active Problem List   Diagnosis Code    Paroxysmal atrial fibrillation (HCC) I48.0    Essential hypertension I10    Mixed hyperlipidemia E78.2    Uterine cancer ([facility] C55    Chest pain R07.9    Atrial fibrillation (HCC) I48.91    Paroxysmal A-fib (HCC) I48.0    Atherosclerosis of native coronary artery of native heart with unstable angina pectoris (HCC) I25.110    S/P CABG x 4 Z95.1    Suspected sleep apnea R29.818    CAD (coronary artery disease) I25.10    AMOR on CPAP G47.33, Z99.89    Pulmonary edema J81.1    Anxiety F41.9    Acute respiratory failure with hypoxia (HCC) J96.01    Bilateral pulmonary infiltrates on chest x-ray R91.8    Hypotension I95.9    YURI (acute kidney injury) ([facility] N17.9     Assessment:  (Medical Decision Making)     Hospital Problems  Date Reviewed: 9/22/2020          Codes Class Noted POA    YURI (acute kidney injury) ([facility] ICD-10-CM: N17.9  ICD-9-CM: 584.9  10/5/2020 Unknown     cr trending down    Hypotension ICD-10-CM: I95.9  ICD-9-CM: 458.9  10/3/2020 Unknown        Acute respiratory failure with hypoxia (HCC) ICD-10-CM: J96.01  ICD-9-CM: 518.81  10/2/2020 Unknown        Bilateral pulmonary infiltrates on chest x-ray ICD-10-CM: R91.8  ICD-9-CM: 793.19  10/2/2020 Unknown    Improved-increased procal but cultures negative      Pulmonary edema ICD-10-CM: J81.1  ICD-9-CM: 514  10/1/2020 Unknown        Anxiety ICD-10-CM: F41.9  ICD-9-CM: 300.00  10/1/2020 Unknown        AMOR on CPAP ICD-10-CM: G47.33, Z99.89  ICD-9-CM: 327.23, V46.8  9/30/2020 Unknown        Paroxysmal A-fib (HCC) ICD-10-CM: I48.0  ICD-9-CM: 427.31  9/29/2020 Unknown        Atherosclerosis of native coronary artery of native heart with unstable angina pectoris ([facility] ICD-10-CM: I25.110  ICD-9-CM: 414.01, 411.1  9/29/2020 Unknown        * (Principal) S/P CABG x 4 ICD-10-CM: Z95.1  ICD-9-CM: V45.81  9/29/2020 Unknown        CAD (coronary artery disease) ICD-10-CM: I25.10  ICD-9-CM: 414.00  9/29/2020 Unknown        Paroxysmal atrial fibrillation ([facility] ICD-10-CM: I48.0  ICD-9-CM: 427.31  9/18/2020 Unknown            Plan:  (Medical Decision Making)     - on Cefepime per ID   - respiratory culture NGTD 10/6, blood cultures 10/7  - SSI  -IV Protonix  -replace K  -can go to telemetry    More than 50% of the time documented was spent in face-to-face contact with the patient and in the care of the patient on the floor/unit where the patient is located.     Mehdi Wolfe MD

## 2020-10-09 NOTE — PROGRESS NOTES
TRANSFER - IN REPORT:    Verbal report received from [facility] 8231 RN(name) on Marya Daly  being received from CV ICU(unit) for routine progression of care      Report consisted of patients Situation, Background, Assessment and   Recommendations(SBAR). Information from the following report(s) SBAR, Kardex, [facility] and Cardiac Rhythm NSR was reviewed with the receiving nurse. Opportunity for questions and clarification was provided. Assessment completed upon patients arrival to unit and care assumed. Dual skin assessment completed with RN. Rectal / anus area pink/red irritated from multiple BM. Allevyn pulled back no skin breakdown noted, Allevyn reapplied. Refer to wound area for documentation on incisions.

## 2020-10-09 NOTE — PROGRESS NOTES
Infectious Disease Progress Note    Today's Date: 10/9/2020   Admit Date: 2020    Impression:   · Leukocytosis  · Fever  · Acute respiratory failure, s/p emergent intubation and bronch 10/2, culture negative; viral negative, fungal, atypical pending - suspect volume overload as the cause  · YURI, improved  · CABG x 4     Plan:   · Her WBC is rising and she had 5 BMs documented overnight. Clinically, she looks great. We haven't identified an infection clearly, she has been treated adequately for HCAP, and she is now on room air and comfortable. · Based on all of this, I think the best plan is to observe off of antibiotics for the time being. · If her stools continue to be an issue, then I would send stool for C diff and / or treat empirically. Today nursing was skeptical that her stools were suspicious for C diff, so I am comfortable waiting. · ID will continue to follow    Anti-infectives:   · Cefazolin ( -)  · Ceftriaxone (10/2-10/3)  · Zosyn (10/3 - 10/7)  · Cefepime (10/7- 10/9)  · Vancomycin (10/3-10/5, 10/7- 10/9)    Subjective: Interval History: Remains on room air, sitting in a chair; had several loose stools overnight per nursing    Allergies   Allergen Reactions    Influenza Virus Vaccines Other (comments)     Gives her the flu. Review of Systems:  A comprehensive review of systems was negative except for that written in the History of Present Illness.      Objective:     Visit Vitals  BP (!) 177/74   Pulse 65   Temp 98.3 °F (36.8 °C)   Resp (!) 34   Ht 5' 3\" (1.6 m)   Wt 82.8 kg (182 lb 8.7 oz)   SpO2 97%   BMI 32.34 kg/m²     Temp (24hrs), Av.4 °F (36.3 °C), Min:96.8 °F (36 °C), Max:98.3 °F (36.8 °C)     General: Alert, no acute distress; sitting in chair  Head:    Normocephalic, atraumatic  Eyes:   Anicteric sclerae, small laceration above L eye  Mouth:  Moist mucosa, op clear  Neck:   Supple, symmetrical, trachea midline, no JVD  Lungs:   Clear anterior lungs; no wheezing;  CV:   Regular rate and rhythm without audible murmur; sternal incision bandaged; Abdomen:  Soft, non tender, mildly distended; active bowel sounds noted; Extremities:  Trace LE edema; LLE incisions with mild erythema around the sites, no drainage  Pulses:  2+ DP bilaterally  Musculoskeletal: Moves all extremities with equal strength, no deformity  Skin:   No acute rash  Psych:   Alert, appropriate without evidence of thought disorder  Lines:    benign    Data Review:     CBC:  Recent Labs     10/09/20  0344 10/08/20  0258 10/07/20  0302   WBC 27.6* 25.6* 22.1*   GRANS 85*  --   --    MONOS 6  --   --    EOS 1  --   --    ANEU 23.5*  --   --    ABL 1.5  --   --    HGB 9.9* 10.5* 9.7*   HCT 29.9* 32.1* 29.3*   * 337 299       BMP:  Recent Labs     10/09/20  0344 10/08/20  0258 10/07/20  0302   CREA 1.75* 2.60* 2.70*   BUN 41* 53* 44*    145 142   K 2.5* 3.4* 3.7    106 102   CO2 31 36* 36*   AGAP 7 3* 4*   * 282* 193*       LFTS:  No results for input(s): TBILI, ALT, AP, TP, ALB in the last 72 hours.     No lab exists for component: SGOT    Microbiology:     All Micro Results     Procedure Component Value Units Date/Time    CULTURE, BLOOD [[id number]] Collected:  10/07/20 1312    Order Status:  Completed Specimen:  Blood Updated:  10/08/20 0907     Special Requests: --        LEFT  HAND       Culture result: NO GROWTH AFTER 19 HOURS       CULTURE, BLOOD [[id number]] Collected:  10/07/20 1406    Order Status:  Completed Specimen:  Blood Updated:  10/08/20 0907     Special Requests: --        LEFT  HAND       Culture result: NO GROWTH AFTER 18 HOURS       ATYPICAL PNEUMONIA BY PCR,BRONCHOALVEOLAR LAVAGE - INCLUDES: MYCOPLASMA PNEUMONIAE AND CHLAMYDIA PNEUMONIAE [[id number]] Collected:  10/02/20 1245    Order Status:  Completed Specimen:  Bronchial lavage Updated:  10/08/20 0737     M. pneumoniae Not Detected        C. pneumonaie Not Detected        Comment: (NOTE)  This test was developed and its performance  characteristics determined by [facility]. It has not  been cleared or approved by the U.S. Food and Drug  Administration. Results should be used in conjunction with  clinical findings, and should not form the sole basis for  a diagnosis or treatment decision. Performed At: [facility]  [address] [place], [place] [id number]  Maribell Soria PhD BT:[id number]         L. PNEUMOPHILA BY Khurram Mcarthur [[id number]] Collected:  10/02/20 1245    Order Status:  Completed Specimen:  Bronchial lavage Updated:  10/08/20 0737     L. pneumophila Not Detected        Comment: (NOTE)  This test was developed and its performance  characteristics determined by [facility]. It has not  been cleared or approved by the U.S. Food and Drug  Administration. Results should be used in conjunction with  clinical findings, and should not form the sole basis for  a diagnosis or treatment decision.   Performed At: [place], [place] [id number]  Maribell Soria PhD SR:[id number]          Alva Legionella Not Detected       CULTURE, RESPIRATORY/SPUTUM/BRONCH María Sol [[id number]] Collected:  10/06/20 5024    Order Status:  Completed Specimen:  Sputum from Endotracheal aspirate Updated:  10/08/20 0720     Special Requests: NO SPECIAL REQUESTS        GRAM STAIN 5 TO 25 WBCS SEEN PER OIF      NO EPITHELIAL CELLS SEEN         NO DEFINITE ORGANISM SEEN         3+ MUCUS PRESENT        Culture result: NO GROWTH 2 DAYS       FUNGUS CULTURE AND SMEAR Radha Roa [[id number]] Collected:  10/02/20 1245    Order Status:  Completed Specimen:  Other from Miscellaneous sample Updated:  10/07/20 1436     Source BRONCHIAL LAVAGE        Fungus stain Direct Inoculation     Fungus (Mycology) Culture Other source received     Comment: (NOTE)  Performed At: [address] [id number]  Darwin Miles MD :[id number]         RESPIRATORY VIRAL PANEL, PCR [[id number]] Collected:  10/02/20 1245    Order Status:  Completed Specimen:  Sputum from Respiratory sample Updated:  10/07/20 1237     Source BRONCHIAL LAVAGE        Influenza A Negative        Influenza B Negative        RSV A Negative        RSV B Negative        Parainfluenza 1 Negative        Parainfluenza 2 Negative        Parainfluenza 3 Negative        Rhinovirus Negative        Metapneumovirus Negative        Adenovirus Negative        Comment: (NOTE)  [facility] test number [id number] Respiratory Virus Profile (RVP), PCR will  become non-orderable on 11/02/2020. See [employer] for alternatives.   Performed At: [address] [id number]  Jennifer Jacobs MD :[id number]         CULTURE, BLOOD [[id number]] Collected:  10/02/20 0734    Order Status:  Completed Specimen:  Blood Updated:  10/07/20 0834     Special Requests: --        LEFT  HAND       Culture result: NO GROWTH 5 DAYS       CULTURE, BLOOD [[id number]] Collected:  10/02/20 0728    Order Status:  Completed Specimen:  Blood Updated:  10/07/20 0834     Special Requests: --        RIGHT  HAND       Culture result: NO GROWTH 5 DAYS       CULTURE, RESPIRATORY/SPUTUM/BRONCH Saavedra Oris [[id number]] Collected:  10/02/20 1245    Order Status:  Completed Specimen:  Sputum from Bronchial lavage Updated:  10/04/20 1015     Special Requests: NO SPECIAL REQUESTS        GRAM STAIN 0 TO 15 WBC'S SEEN PER OIF      NO EPITHELIAL CELLS SEEN         NO DEFINITE ORGANISM SEEN        Culture result: NO GROWTH 2 DAYS       CULTURE, URINE [[id number]] Collected:  10/02/20 0900    Order Status:  Completed Specimen:  Cath Urine Updated:  10/04/20 0714     Special Requests: NO SPECIAL REQUESTS        Culture result: NO GROWTH 2 DAYS       AFB (MYCOBACTERIUM) CULTURE & SMEAR W/REFLEX ID Harshal Infante NOCARDIA [[id number]] Collected:  10/02/20 1245    Order Status:  Completed Specimen:  Miscellaneous sample Updated:  10/03/20 1735     Source BRONCHIAL LAVAGE        AFB Specimen processing Concentration     Acid Fast Smear Negative        Comment: (NOTE)  Performed At: [address] [id number]  Susan Ahn MD GQ:[id number]          Acid Fast Culture PENDING          Imaging:   No new imaging    Signed By: Joss Alejandra MD     October 9, 2020

## 2020-10-09 NOTE — PROGRESS NOTES
SPEECH LANGUAGE PATHOLOGY: DYSPHAGIA- Initial Assessment and Discharge    NAME/AGE/GENDER: Catalina Myers is a 56 y.o. female  DATE: 10/9/2020  PRIMARY DIAGNOSIS: Atherosclerosis of native coronary artery with unstable angina pectoris, unspecified whether native or transplanted heart (HCC) [I25.110]  Paroxysmal A-fib ([facility]) [I48.0]  Paroxysmal A-fib ([facility] [I48.0]  Atherosclerosis of native coronary artery of native heart with unstable angina pectoris ([facility] [I25.110]  CAD (coronary artery disease) [I25.10]  Paroxysmal atrial fibrillation (HCC) [I48.0]  Procedure(s) (LRB):  BRONCHOSCOPY ROOM 104 (N/A)  BRONCHIAL ALVEOLAR LAVAGE (N/A) 7 Days Post-Op  ICD-10: Treatment Diagnosis: R13.11 Dysphagia, Oral Phase    RECOMMENDATIONS   DIET:    PO:  Regular   Liquids:  regular thin    MEDICATIONS: 1 at a time with liquid rinse or floated in puree     ASPIRATION PRECAUTIONS  · Slow rate of intake  · Small bites/sips  · Upright at 90 degrees during meal     COMPENSATORY STRATEGIES/MODIFICATIONS  · None     EDUCATION:  · Recommendations discussed with Nursing  · Patient     CONTINUATION OF SKILLED SERVICES/MEDICAL NECESSITY:   No dysphagia goals identified as swallow function is grossly within normal limits.       RECOMMENDATIONS for CONTINUED SPEECH THERAPY:   No further speech therapy indicated at this time. ASSESSMENT   Patient presents with mild oral dysphagia due to edentulous state resulting in prolonged, but functional mastication of chewables. No pharyngeal dysphagia symptoms identified. Consuming thin liquids, puree, and chewable trials without overt s/sx airway compromise. In regards to oral dysphagia, RN reports patient'naldo is on her way to bring patient her dentures. Patient independently took small bites and ensured oral cavity was clear before taking additional bites. Recommend initiate regular consistency diet/thin liquids. Medications 1 at a time with liquid rinse of floated in puree. No dysphagia goals established at this time. REHABILITATION POTENTIAL FOR STATED GOALS: Excellent    PLAN    FREQUENCY/DURATION: No further speech therapy indicated at this time as oropharyngeal swallow function is within normal limits. - Recommendations for next treatment session: No additional speech therapy indicated at this time. SUBJECTIVE   Patient alert upright in bedside chair for assessment. Friendly and pleasant. Now on room air. History of Present Injury/Illness: Ms. Gan  has a past medical history of Anxiety, Borderline diabetes, Endometrial cancer ([facility] [address] [address] Essential hypertension (9/18/2020), Heart failure ([facility] History of anemia, Mixed hyperlipidemia (9/18/2020), Paroxysmal atrial fibrillation ([facility] (9/18/2020), and Sleep apnea. She also has no past medical history of Difficult intubation, Malignant hyperthermia due to anesthesia, Nausea & vomiting, or Pseudocholinesterase deficiency. . She also  has a past surgical history that includes hx hysterectomy; hx heart catheterization (09/18/2020); hx cyst removal; hx cyst removal; and hx orthopaedic. Problem List:  (Impairments causing functional limitations):  1. Oral dysphagia    Previous Dysphagia: NONE REPORTED  Diet Prior to Evaluation: Patient has nutritional supplements ordered. Reports she been drinking Ensure and water. Orientation:   Person  Place  Time  Situation    Pain: Pain Scale 1: Numeric (0 - 10)  Pain Intensity 1: 0    Cognitive-Linguistic Screen:  Prior level of function: Lives at home alone. Works as a hospice nurse   Speech Production:   o WNL   Expressive Language:  o WNL- No word finding deficits in conversation   Receptive Language:  o WNL- follows commands and responds appropriately to questions   Cognition:   o Mildly impaired? Suspect due to prolonged intubation/sedation  Recommendations: Cognitive linguistic assessment not clinically indicated at this time.  Please re-consult if cognitive status does not improve now that patient is off vent and no longer requiring sedation. OBJECTIVE   Oral Motor:   · Labial: No impairment  · Dentition: Edentulous  · Oral Hygiene: Adequate  · Lingual: No impairment    Swallow evaluation:   Patient consumed trials of thin by cup/straw/serial sips, puree, mixed, and solid consistency. Delayed cough x1 with mixed consistency; however, no further overt s/sx airway compromise with ~5 oz thin by cup/straw, puree across 5 trials, mixed across 3 further trials, or solid consistency. Prolonged oral prep time due to edentulous state; however, patient able to achieve adequate oral clearance given increased time. INTERDISCIPLINARY COLLABORATION: Registered Nurse  PRECAUTIONS/ALLERGIES: Influenza virus vaccines     Tool Used: Dysphagia Outcome and Severity Scale (PETRA)    Score Comments   Normal Diet  [] 7 With no strategies or extra time needed   Functional Swallow  [x] 6 May have mild oral or pharyngeal delay   Mild Dysphagia  [] 5 Which may require one diet consistency restricted    Mild-Moderate Dysphagia  [] 4 With 1-2 diet consistencies restricted   Moderate Dysphagia  [] 3 With 2 or more diet consistencies restricted   Moderate-Severe Dysphagia  [] 2 With partial PO strategies (trials with ST only)   Severe Dysphagia  [] 1 With inability to tolerate any PO safely      Score:  Initial: 6 Most Recent: x (Date 10/09/20 )   Interpretation of Tool: The Dysphagia Outcome and Severity Scale (PETRA) is a simple, easy-to-use, 7-point scale developed to systematically rate the functional severity of dysphagia based on objective assessment and make recommendations for diet level, independence level, and type of nutrition. Current Medications:   No current facility-administered medications on file prior to encounter. Current Outpatient Medications on File Prior to Encounter   Medication Sig Dispense Refill    lisinopriL (PRINIVIL, ZESTRIL) 20 mg tablet Take 1 Tab by mouth daily. (Patient taking differently: Take 20 mg by mouth daily. Do not take morning of procedure.) 30 Tab 11    rosuvastatin (CRESTOR) 40 mg tablet Take 1 Tab by mouth nightly. 30 Tab 11    amiodarone (CORDARONE) 200 mg tablet Take 1 Tab by mouth two (2) times a day. (Patient taking differently: Take 200 mg by mouth two (2) times a day. Take morning of procedure.) 60 Tab 11    aspirin 81 mg chewable tablet Take 81 mg by mouth daily. Take morning of procedure.  metoprolol tartrate (LOPRESSOR) 25 mg tablet Take 1 Tab by mouth two (2) times a day. (Patient taking differently: Take 25 mg by mouth two (2) times a day. Take morning of procedure. Per Christina Carroll reduce dose to 12.5 mg) 60 Tab 11    nitroglycerin (NITROSTAT) 0.4 mg SL tablet 1 Tab by SubLINGual route every five (5) minutes as needed for Chest Pain. Up to 3 doses. (Patient taking differently: 0.4 mg by SubLINGual route every five (5) minutes as needed for Chest Pain. Up to 3 doses. Bring nitroglycerin with you morning of procedure.) 1 Bottle 5    rivaroxaban (XARELTO) 20 mg tab tablet Take 1 Tab by mouth daily (with breakfast). (Patient taking differently: Take 20 mg by mouth daily (with breakfast). Last dose 9/23/20) 30 Tab 11    furosemide (Lasix) 40 mg tablet Take 40 mg by mouth daily as needed. Do not take morning of procedure.          After treatment position/precautions:  · Up in chair  · RN notified    Total Treatment Duration:   Time In: 0684  Time Out: [address] [facility] [address], [address] [facility]

## 2020-10-09 NOTE — PROGRESS NOTES
Verbal bedside report given to mariana Rincon RN. Patient's situation, background, assessment and recommendations provided. Opportunity for questions provided. Oncoming RN assumed care of patient.

## 2020-10-09 NOTE — PROGRESS NOTES
Problem: Mobility Impaired (Adult and Pediatric)  Goal: *Acute Goals and Plan of Care (Insert Text)  Outcome: Progressing Towards Goal  Note: LTG:  (1.)Ms. Daly will move from supine to sit and sit to supine , scoot up and down, and roll side to side with INDEPENDENT within 7 treatment day(s). (2.)Ms. Daly will transfer from bed to chair and chair to bed with INDEPENDENT using the least restrictive device within 7 treatment day(s). (3.)Ms. Daly will ambulate with INDEPENDENT for 500+ feet with the least restrictive device within 7 treatment day(s) while maintaining normal vital signs. (4.)Ms. Daly will perform 3-4 steps with HR and SBA within 7 treatment days for safety ascending and descending stairs. (5.)Ms. Daly will be independent in HEP for B LE strength and mobility within 7 treatment days.    ___________________________________________________________________________________________      PHYSICAL THERAPY: Daily Note and AM 10/9/2020  INPATIENT: PT Visit Days : 2  Payor: [employer] / Plan: [facility] / Product Type: PPO /       NAME/AGE/GENDER: Elyse Mccoy is a 56 y.o. female   PRIMARY DIAGNOSIS: Atherosclerosis of native coronary artery with unstable angina pectoris, unspecified whether native or transplanted heart (HCC) [I25.110]  Paroxysmal A-fib (HCC) [I48.0]  Paroxysmal A-fib (HCC) [I48.0]  Atherosclerosis of native coronary artery of native heart with unstable angina pectoris ([address] [I25.110]  CAD (coronary artery disease) [I25.10]  Paroxysmal atrial fibrillation (HCC) [I48.0] S/P CABG x 4 S/P CABG x 4  Procedure(s) (LRB):  BRONCHOSCOPY ROOM 104 (N/A)  BRONCHIAL ALVEOLAR LAVAGE (N/A)  7 Days Post-Op  ICD-10: Treatment Diagnosis:    · Generalized Muscle Weakness (M62.81)  · Difficulty in walking, Not elsewhere classified (R26.2)   Precaution/Allergies:  Influenza virus vaccines      ASSESSMENT:     Ms. Karen Colvin presents with decreased bed mobility, transfers, ambulation, balance, activity tolerance, strength and overall general functional mobility s/p hospital admission with above, now CABG x 4 on 20. Pt with complications post surgery, extubated  10/8/20  No O2 . Prior to surgery, pt works as hospice RN, independent at baseline. Pt lives alone per chart, pt states \"in-laws are in and out\", spouse . Pt in chair on arrival, RN present at bedside. Patient is agreeable to therapy. Requesting to get to the bathroom. Pt required min A for transfers to the [facility]. Stood with min assist for cleaning. Gait with the cardiac walker x 150 feet with slow but safe sandra. Patient is returned to the recliner and after a short rest break the patient is instructed in therapeutic exercises. Tolerated well. Patient is left sitting in the recliner with needs within reach with lap belt intact. PT to follow for acute care needs to address deficits noted above. Anticipate pt to do well with activity due to prior level of function of independent. Will continue PT efforts. This section established at most recent assessment   PROBLEM LIST (Impairments causing functional limitations):  1. Decreased Strength  2. Decreased ADL/Functional Activities  3. Decreased Transfer Abilities  4. Decreased Ambulation Ability/Technique  5. Decreased Balance  6. Decreased Activity Tolerance  7. Decreased Hendricks with Home Exercise Program  8. Decreased Cognition   INTERVENTIONS PLANNED: (Benefits and precautions of physical therapy have been discussed with the patient.)  1. Balance Exercise  2. Bed Mobility  3. Family Education  4. Gait Training  5. Home Exercise Program (HEP)  6. Therapeutic Activites  7. Therapeutic Exercise/Strengthening  8.  Transfer Training     TREATMENT PLAN: Frequency/Duration: twice daily for duration of hospital stay  Rehabilitation Potential For Stated Goals: Good     REHAB RECOMMENDATIONS (at time of discharge pending progress): Placement: It is my opinion, based on this patient's performance to date, that Ms. Daly may benefit from participating in 1-2 additional therapy sessions in order to continue to assess for rehab potential and then make recommendation for disposition at discharge. Equipment:    None at this time              HISTORY:   History of Present Injury/Illness (Reason for Referral):  S/p CABG x 4, see H & P  Past Medical History/Comorbidities:   Ms. Gan  has a past medical history of Anxiety, Borderline diabetes, Endometrial cancer ([facility] [address] Essential hypertension (9/18/2020), Heart failure ([facility] [address] History of anemia, Mixed hyperlipidemia (9/18/2020), Paroxysmal atrial fibrillation ([facility] [address] (9/18/2020), and Sleep apnea. She also has no past medical history of Difficult intubation, Malignant hyperthermia due to anesthesia, Nausea & vomiting, or Pseudocholinesterase deficiency. Ms. Gan  has a past surgical history that includes hx hysterectomy; hx heart catheterization (09/18/2020); hx cyst removal; hx cyst removal; and hx orthopaedic. Social History/Living Environment:   Home Environment: Private residence  # Steps to Enter: 0  One/Two Story Residence: Two story, live on 1st floor  Living Alone: Yes  Support Systems: Child(licha), Family member(s)  Patient Expects to be Discharged to[de-identified] Private residence  Current DME Used/Available at Home: CPAP  Prior Level of Function/Work/Activity:  Lives alone, independent at baseline  Personal Factors:          Sex:  female        Age:  56 y.o.         Overall Behavior:  agreeable, confused   Number of Personal Factors/Comorbidities that affect the Plan of Care:  Cancer, DM  1-2: MODERATE COMPLEXITY   EXAMINATION:   Most Recent Physical Functioning:   Gross Assessment:                  Posture:     Balance:  Sitting: Intact  Sitting - Static: Good (unsupported)  Sitting - Dynamic: Fair (occasional)  Standing: Impaired  Standing - Static: Fair  Standing - Dynamic : Fair Bed Mobility:     Wheelchair Mobility:     Transfers:  Sit to Stand: Minimum assistance;Assist x2  Stand to Sit: Minimum assistance;Assist x2  Gait:     Speed/Lila: Slow  Distance (ft): 150 Feet (ft)  Assistive Device: Other (comment)(cardiac walker)  Ambulation - Level of Assistance: Minimal assistance  Interventions: Safety awareness training      Body Structures Involved:  1. Heart  2. Lungs  3. Muscles Body Functions Affected:  1. Cardio  2. Respiratory  3. Movement Related Activities and Participation Affected:  1. General Tasks and Demands  2. Mobility  3. Self Care   Number of elements that affect the Plan of Care: 4+: HIGH COMPLEXITY   CLINICAL PRESENTATION:   Presentation: Evolving clinical presentation with changing clinical characteristics: MODERATE COMPLEXITY   CLINICAL DECISION MAKIN [facility] Inpatient Short Form  How much difficulty does the patient currently have. .. Unable A Lot A Little None   1. Turning over in bed (including adjusting bedclothes, sheets and blankets)? [] 1   [x] 2   [] 3   [] 4   2. Sitting down on and standing up from a chair with arms ( e.g., wheelchair, bedside commode, etc.)   [] 1   [x] 2   [] 3   [] 4   3. Moving from lying on back to sitting on the side of the bed? [] 1   [x] 2   [] 3   [] 4   How much help from another person does the patient currently need. .. Total A Lot A Little None   4. Moving to and from a bed to a chair (including a wheelchair)? [] 1   [] 2   [x] 3   [] 4   5. Need to walk in hospital room? [] 1   [] 2   [x] 3   [] 4   6. Climbing 3-5 steps with a railing? [] 1   [x] 2   [] 3   [] 4   © , Trustees of [address] [place] [address] [address] [address], under license to [employer]. All rights reserved      Score:  Initial: 14 Most Recent: X (Date: -- )    Interpretation of Tool:  Represents activities that are increasingly more difficult (i.e. Bed mobility, Transfers, Gait).     Medical Necessity:     · Patient is expected to demonstrate progress in   · strength, range of motion, balance, and coordination  ·  to   · decrease assistance required with overall functional mobility, transfers, ambulation  · .  · Patient demonstrates   · good  ·  rehab potential due to higher previous functional level. Reason for Services/Other Comments:  · Patient   · continues to require present interventions due to patient's inability to perform bed mobility, transfers, ambulation safely and effectively at prior level of function of independent. · .   Use of outcome tool(s) and clinical judgement create a POC that gives a: Clear prediction of patient's progress: LOW COMPLEXITY            TREATMENT:   (In addition to Assessment/Re-Assessment sessions the following treatments were rendered)   Pre-treatment Symptoms/Complaints:  \"I need to go to the bathroom\"  Pain: Initial:   Pain Intensity 1: 0  Post Session:  0/10     Therapeutic Activity: (    14 min): Therapeutic activities including Chair transfers, Toilet transfers, Ambulation on level ground, and walker safety, posture to improve mobility, strength, balance, and coordination. Required minimal Safety awareness training to promote static and dynamic balance in standing and promote coordination of bilateral, lower extremity(s). Therapeutic Exercise: ( 12 minutes):  Exercises per grid below to improve mobility, strength, balance and coordination. Required min verbal cues to perform exercises correctly. Progressed repetitions and complexity of movement as indicated.        Date:  10/09/20 Date:   Date:     ACTIVITY/EXERCISE AM PM AM PM AM PM   LAQ 10        Shoulder shrugs 10        Gluteal sets 10        marching 10        Ankle pumps 10        abduction 10                 B = bilateral; AA = active assistive; A = active; P = passive      Braces/Orthotics/Lines/Etc:   · ICU lines and monitors  Treatment/Session Assessment:    · Response to Treatment:  tolerated mobility well eager to get up  · Interdisciplinary Collaboration:   o Physical Therapy Assistant  o Registered Nurse  · After treatment position/precautions:   o Up in chair  o Bed alarm/tab alert on  o Bed/Chair-wheels locked  o Bed in low position  o Call light within reach  o Nurse at bedside  o lap belt   · Compliance with Program/Exercises: Will assess as treatment progresses  · Recommendations/Intent for next treatment session: \"Next visit will focus on advancements to more challenging activities\".   Total Treatment Duration:  PT Patient Time In/Time Out  Time In: 0903  Time Out: 0929    Jaclyn Moon PTA

## 2020-10-09 NOTE — PROGRESS NOTES
[facility]    Follow-Up on: YURI on CKD    HPI: Pt looks great! No complaints.     ROS:  UTO    Current Facility-Administered Medications   Medication Dose Route Frequency    potassium chloride 20 mEq in 100 ml IVPB  20 mEq IntraVENous Q2H    [START ON 10/10/2020] pantoprazole (PROTONIX) tablet 40 mg  40 mg Oral ACB    Saccharomyces boulardii (FLORASTOR) capsule 500 mg  500 mg Oral BID    lisinopriL (PRINIVIL, ZESTRIL) tablet 20 mg  20 mg Oral DAILY    hydrocortisone (ANUSOL-HC) 2.5 % rectal cream   PeriANAL QID PRN    insulin glargine (LANTUS) injection 25 Units  25 Units SubCUTAneous QHS    hydrALAZINE (APRESOLINE) 20 mg/mL injection 20 mg  20 mg IntraVENous Q4H PRN    amiodarone (CORDARONE) 450 mg in dextrose 5% 250 mL infusion  0.5-1 mg/min IntraVENous TITRATE    sodium chloride (NS) flush 30 mL  30 mL InterCATHeter Q8H    sodium chloride (NS) flush 30 mL  30 mL InterCATHeter PRN    LORazepam (ATIVAN) tablet 1 mg  1 mg Oral Q4H PRN    midazolam (VERSED) injection 2 mg  2 mg IntraVENous Multiple    NUTRITIONAL SUPPORT ELECTROLYTE PRN ORDERS   Does Not Apply PRN    LORazepam (ATIVAN) injection 1 mg  1 mg IntraVENous Q4H PRN    traMADoL (ULTRAM) tablet 100 mg  100 mg Oral Q6H PRN    oxyCODONE-acetaminophen (PERCOCET) 5-325 mg per tablet 1 Tab  1 Tab Oral Q4H PRN    insulin regular (NOVOLIN R, HUMULIN R) injection   SubCUTAneous AC&HS    alcohol 62% (NOZIN) nasal  1 Ampule  1 Ampule Topical Q12H    rosuvastatin (CRESTOR) tablet 40 mg  40 mg Oral QHS    sodium chloride (NS) flush 5-40 mL  5-40 mL IntraVENous Q8H    sodium chloride (NS) flush 5-40 mL  5-40 mL IntraVENous PRN    naloxone (NARCAN) injection 0.4 mg  0.4 mg IntraVENous PRN    sodium bicarbonate (8.4%) injection 50 mEq  50 mEq IntraVENous PRN    amiodarone (CORDARONE) tablet 200 mg  200 mg Oral Q12H    metoprolol tartrate (LOPRESSOR) tablet 25 mg  25 mg Oral Q12H    ondansetron (ZOFRAN) injection 4 mg  4 mg IntraVENous Q4H PRN    dextrose (D50W) injection syrg 12.5 g  25 mL IntraVENous PRN    aspirin chewable tablet 81 mg  81 mg Oral DAILY    magnesium sulfate 1 g/100 ml IVPB (premix or compounded)  1 g IntraVENous PRN    acetaminophen (TYLENOL) tablet 650 mg  650 mg Oral Q4H PRN       Exam:  Vitals:    10/09/20 0700 10/09/20 0759 10/09/20 0858 10/09/20 0932   BP:  (!) 177/74 (!) 183/73 (!) 168/72   Pulse: 67 65 66 65   Resp: 30 (!) 34 (!) 33 16   Temp:       SpO2: 94% 97% 96% 95%   Weight:       Height:             Intake/Output Summary (Last 24 hours) at 10/9/2020 1037  Last data filed at 10/9/2020 0929  Gross per 24 hour   Intake 1180 ml   Output 1325 ml   Net -145 ml     PE:  GEN - in no distress  CV - regular, no murmur, no rub  Lung - clear bilaterally  Abd - soft, nontender  Ext - no edema    Labs  Recent Labs     10/09/20  0344 10/08/20  0258 10/07/20  0302   WBC 27.6* 25.6* 22.1*   HGB 9.9* 10.5* 9.7*   HCT 29.9* 32.1* 29.3*   * 337 299     Recent Labs     10/09/20  0857 10/09/20  0344 10/08/20  0258 10/07/20  0302    141 145 142   K 3.5 2.5* 3.4* 3.7    103 106 102   CO2 30 31 36* 36*   BUN 38* 41* 53* 44*   CREA 1.66* 1.75* 2.60* 2.70*   * 164* 282* 193*   CA 8.6 8.5 8.7 8.7   MG  --  2.6* 2.9* 2.5*   PHOS  --  3.1  --  2.5     No results for input(s): PH, PCO2, PO2, PCO2 in the last 72 hours.     Problem List:  Patient Active Problem List    Diagnosis Date Noted    YURI (acute kidney injury) ([facility] 10/05/2020    Hypotension 10/03/2020    Acute respiratory failure with hypoxia ([facility] 10/02/2020    Bilateral pulmonary infiltrates on chest x-ray 10/02/2020    Pulmonary edema 10/01/2020    Anxiety 10/01/2020    AMOR on CPAP 09/30/2020    Paroxysmal A-fib ([facility] 09/29/2020    Atherosclerosis of native coronary artery of native heart with unstable angina pectoris ([facility] 09/29/2020    S/P CABG x 4 09/29/2020    Suspected sleep apnea 09/29/2020    CAD (coronary artery disease) 09/29/2020    Paroxysmal atrial fibrillation ([facility] 09/18/2020    Essential hypertension 09/18/2020    Mixed hyperlipidemia 09/18/2020    Uterine cancer ([facility] 09/18/2020    Chest pain 09/18/2020    Atrial fibrillation ([facility] 09/18/2020       Issues Addressed By Nephrology:    Plan:  1. S/P CABG X4 V  2. Resp Failure  3. Pulmonary edema  4. YURI on CKD stable   5. HTN may restart home BP meds including Lisinopril  6. Hypokalemia add Lisinopril and give 60 meq IV potassium replacement.   Continue supportive care

## 2020-10-09 NOTE — PROGRESS NOTES
TRANSFER - OUT REPORT:    Verbal report given to Joy Acosta RN(name) on [employer]  being transferred to [facility](unit) for routine progression of care       Report consisted of patients Situation, Background, Assessment and   Recommendations(SBAR). Information from the following report(s) SBAR, Kardex, Intake/Output, MAR, Recent Results and Cardiac Rhythm NSR was reviewed with the receiving nurse. Opportunity for questions and clarification was provided.       Patient transported with:   Monitor  Registered Nurse

## 2020-10-10 ENCOUNTER — APPOINTMENT (OUTPATIENT)
Dept: GENERAL RADIOLOGY | Age: 60
DRG: 228 | End: 2020-10-10
Attending: PHYSICIAN ASSISTANT
Payer: COMMERCIAL

## 2020-10-10 PROBLEM — I95.9 HYPOTENSION: Status: RESOLVED | Noted: 2020-10-03 | Resolved: 2020-10-10

## 2020-10-10 PROBLEM — R91.8 BILATERAL PULMONARY INFILTRATES ON CHEST X-RAY: Status: RESOLVED | Noted: 2020-10-02 | Resolved: 2020-10-10

## 2020-10-10 PROBLEM — J96.01 ACUTE RESPIRATORY FAILURE WITH HYPOXIA (HCC): Status: RESOLVED | Noted: 2020-10-02 | Resolved: 2020-10-10

## 2020-10-10 LAB
ANION GAP SERPL CALC-SCNC: 7 MMOL/L (ref 7–16)
BUN SERPL-MCNC: 35 MG/DL (ref 8–23)
CALCIUM SERPL-MCNC: 8 MG/DL (ref 8.3–10.4)
CHLORIDE SERPL-SCNC: 104 MMOL/L (ref 98–107)
CO2 SERPL-SCNC: 29 MMOL/L (ref 21–32)
CREAT SERPL-MCNC: 1.54 MG/DL (ref 0.6–1)
ERYTHROCYTE [DISTWIDTH] IN BLOOD BY AUTOMATED COUNT: 14.8 % (ref 11.9–14.6)
GLUCOSE BLD STRIP.AUTO-MCNC: 133 MG/DL (ref 65–100)
GLUCOSE BLD STRIP.AUTO-MCNC: 168 MG/DL (ref 65–100)
GLUCOSE BLD STRIP.AUTO-MCNC: 246 MG/DL (ref 65–100)
GLUCOSE BLD STRIP.AUTO-MCNC: 368 MG/DL (ref 65–100)
GLUCOSE SERPL-MCNC: 143 MG/DL (ref 65–100)
HCT VFR BLD AUTO: 28.7 % (ref 35.8–46.3)
HGB BLD-MCNC: 9.5 G/DL (ref 11.7–15.4)
MAGNESIUM SERPL-MCNC: 2.5 MG/DL (ref 1.8–2.4)
MCH RBC QN AUTO: 28.7 PG (ref 26.1–32.9)
MCHC RBC AUTO-ENTMCNC: 33.1 G/DL (ref 31.4–35)
MCV RBC AUTO: 86.7 FL (ref 79.6–97.8)
NRBC # BLD: 0 K/UL (ref 0–0.2)
PLATELET # BLD AUTO: 397 K/UL (ref 150–450)
PMV BLD AUTO: 10.7 FL (ref 9.4–12.3)
POTASSIUM SERPL-SCNC: 2.6 MMOL/L (ref 3.5–5.1)
POTASSIUM SERPL-SCNC: 2.8 MMOL/L (ref 3.5–5.1)
POTASSIUM SERPL-SCNC: 3.3 MMOL/L (ref 3.5–5.1)
RBC # BLD AUTO: 3.31 M/UL (ref 4.05–5.2)
SODIUM SERPL-SCNC: 140 MMOL/L (ref 136–145)
WBC # BLD AUTO: 15.9 K/UL (ref 4.3–11.1)

## 2020-10-10 PROCEDURE — 85027 COMPLETE CBC AUTOMATED: CPT

## 2020-10-10 PROCEDURE — 71045 X-RAY EXAM CHEST 1 VIEW: CPT

## 2020-10-10 PROCEDURE — 74011250637 HC RX REV CODE- 250/637: Performed by: PHYSICIAN ASSISTANT

## 2020-10-10 PROCEDURE — 74011250636 HC RX REV CODE- 250/636: Performed by: PHYSICIAN ASSISTANT

## 2020-10-10 PROCEDURE — 74011250637 HC RX REV CODE- 250/637: Performed by: THORACIC SURGERY (CARDIOTHORACIC VASCULAR SURGERY)

## 2020-10-10 PROCEDURE — 80048 BASIC METABOLIC PNL TOTAL CA: CPT

## 2020-10-10 PROCEDURE — 83735 ASSAY OF MAGNESIUM: CPT

## 2020-10-10 PROCEDURE — 74011250637 HC RX REV CODE- 250/637: Performed by: INTERNAL MEDICINE

## 2020-10-10 PROCEDURE — 74011250636 HC RX REV CODE- 250/636: Performed by: THORACIC SURGERY (CARDIOTHORACIC VASCULAR SURGERY)

## 2020-10-10 PROCEDURE — 99232 SBSQ HOSP IP/OBS MODERATE 35: CPT | Performed by: INTERNAL MEDICINE

## 2020-10-10 PROCEDURE — 84132 ASSAY OF SERUM POTASSIUM: CPT

## 2020-10-10 PROCEDURE — 99254 IP/OBS CNSLTJ NEW/EST MOD 60: CPT | Performed by: INTERNAL MEDICINE

## 2020-10-10 PROCEDURE — 2709999900 HC NON-CHARGEABLE SUPPLY

## 2020-10-10 PROCEDURE — 74011250636 HC RX REV CODE- 250/636: Performed by: NURSE PRACTITIONER

## 2020-10-10 PROCEDURE — 97530 THERAPEUTIC ACTIVITIES: CPT

## 2020-10-10 PROCEDURE — 65660000004 HC RM CVT STEPDOWN

## 2020-10-10 PROCEDURE — 77030012890

## 2020-10-10 PROCEDURE — 74011636637 HC RX REV CODE- 636/637: Performed by: PHYSICIAN ASSISTANT

## 2020-10-10 PROCEDURE — 82962 GLUCOSE BLOOD TEST: CPT

## 2020-10-10 PROCEDURE — 36415 COLL VENOUS BLD VENIPUNCTURE: CPT

## 2020-10-10 PROCEDURE — 74011000258 HC RX REV CODE- 258: Performed by: THORACIC SURGERY (CARDIOTHORACIC VASCULAR SURGERY)

## 2020-10-10 RX ORDER — DIPHENHYDRAMINE HCL 25 MG
25 CAPSULE ORAL
Status: DISCONTINUED | OUTPATIENT
Start: 2020-10-10 | End: 2020-10-12 | Stop reason: HOSPADM

## 2020-10-10 RX ORDER — LISINOPRIL 20 MG/1
40 TABLET ORAL DAILY
Status: DISCONTINUED | OUTPATIENT
Start: 2020-10-11 | End: 2020-10-12 | Stop reason: HOSPADM

## 2020-10-10 RX ORDER — POTASSIUM CHLORIDE 20 MEQ/1
20 TABLET, EXTENDED RELEASE ORAL DAILY
Status: DISCONTINUED | OUTPATIENT
Start: 2020-10-10 | End: 2020-10-10

## 2020-10-10 RX ORDER — POTASSIUM CHLORIDE 14.9 MG/ML
20 INJECTION INTRAVENOUS
Status: COMPLETED | OUTPATIENT
Start: 2020-10-10 | End: 2020-10-10

## 2020-10-10 RX ORDER — POTASSIUM CHLORIDE 14.9 MG/ML
20 INJECTION INTRAVENOUS ONCE
Status: COMPLETED | OUTPATIENT
Start: 2020-10-10 | End: 2020-10-10

## 2020-10-10 RX ORDER — CARVEDILOL 6.25 MG/1
6.25 TABLET ORAL 2 TIMES DAILY WITH MEALS
Status: DISCONTINUED | OUTPATIENT
Start: 2020-10-10 | End: 2020-10-12 | Stop reason: HOSPADM

## 2020-10-10 RX ORDER — POTASSIUM CHLORIDE 20 MEQ/1
40 TABLET, EXTENDED RELEASE ORAL 2 TIMES DAILY
Status: DISCONTINUED | OUTPATIENT
Start: 2020-10-10 | End: 2020-10-12

## 2020-10-10 RX ORDER — AMLODIPINE BESYLATE 10 MG/1
10 TABLET ORAL DAILY
Status: DISCONTINUED | OUTPATIENT
Start: 2020-10-10 | End: 2020-10-12 | Stop reason: HOSPADM

## 2020-10-10 RX ADMIN — ACETAMINOPHEN 650 MG: 325 TABLET, FILM COATED ORAL at 15:29

## 2020-10-10 RX ADMIN — Medication 10 ML: at 11:58

## 2020-10-10 RX ADMIN — POTASSIUM CHLORIDE 20 MEQ: 20 TABLET, EXTENDED RELEASE ORAL at 11:54

## 2020-10-10 RX ADMIN — Medication 10 ML: at 21:19

## 2020-10-10 RX ADMIN — AMIODARONE HYDROCHLORIDE 200 MG: 200 TABLET ORAL at 08:38

## 2020-10-10 RX ADMIN — PANTOPRAZOLE SODIUM 40 MG: 40 TABLET, DELAYED RELEASE ORAL at 06:10

## 2020-10-10 RX ADMIN — POTASSIUM CHLORIDE 40 MEQ: 20 TABLET, EXTENDED RELEASE ORAL at 17:56

## 2020-10-10 RX ADMIN — POTASSIUM CHLORIDE 20 MEQ: 200 INJECTION, SOLUTION INTRAVENOUS at 08:57

## 2020-10-10 RX ADMIN — RDII 250 MG CAPSULE 500 MG: at 08:38

## 2020-10-10 RX ADMIN — CARVEDILOL 6.25 MG: 6.25 TABLET, FILM COATED ORAL at 13:31

## 2020-10-10 RX ADMIN — Medication 1 AMPULE: at 21:20

## 2020-10-10 RX ADMIN — AMIODARONE HYDROCHLORIDE 200 MG: 200 TABLET ORAL at 21:18

## 2020-10-10 RX ADMIN — HYDRALAZINE HYDROCHLORIDE 20 MG: 20 INJECTION, SOLUTION INTRAMUSCULAR; INTRAVENOUS at 03:26

## 2020-10-10 RX ADMIN — ROSUVASTATIN CALCIUM 40 MG: 20 TABLET, COATED ORAL at 21:18

## 2020-10-10 RX ADMIN — RDII 250 MG CAPSULE 500 MG: at 17:56

## 2020-10-10 RX ADMIN — AMIODARONE HYDROCHLORIDE 150 MG: 50 INJECTION, SOLUTION INTRAVENOUS at 07:04

## 2020-10-10 RX ADMIN — INSULIN LISPRO 15 UNITS: 100 INJECTION, SOLUTION INTRAVENOUS; SUBCUTANEOUS at 15:55

## 2020-10-10 RX ADMIN — AMLODIPINE BESYLATE 10 MG: 10 TABLET ORAL at 11:55

## 2020-10-10 RX ADMIN — Medication 30 ML: at 03:26

## 2020-10-10 RX ADMIN — Medication 30 ML: at 21:25

## 2020-10-10 RX ADMIN — ACETAMINOPHEN 650 MG: 325 TABLET, FILM COATED ORAL at 08:38

## 2020-10-10 RX ADMIN — INSULIN GLARGINE 28 UNITS: 100 INJECTION, SOLUTION SUBCUTANEOUS at 21:21

## 2020-10-10 RX ADMIN — INSULIN LISPRO 2 UNITS: 100 INJECTION, SOLUTION INTRAVENOUS; SUBCUTANEOUS at 08:36

## 2020-10-10 RX ADMIN — DIPHENHYDRAMINE HYDROCHLORIDE 25 MG: 25 CAPSULE ORAL at 21:18

## 2020-10-10 RX ADMIN — INSULIN LISPRO 4 UNITS: 100 INJECTION, SOLUTION INTRAVENOUS; SUBCUTANEOUS at 11:57

## 2020-10-10 RX ADMIN — AMIODARONE HYDROCHLORIDE 1 MG/MIN: 50 INJECTION, SOLUTION INTRAVENOUS at 07:04

## 2020-10-10 RX ADMIN — ASPIRIN 81 MG: 81 TABLET, CHEWABLE ORAL at 08:38

## 2020-10-10 RX ADMIN — Medication 30 ML: at 06:15

## 2020-10-10 RX ADMIN — POTASSIUM CHLORIDE 20 MEQ: 20 TABLET, EXTENDED RELEASE ORAL at 08:43

## 2020-10-10 RX ADMIN — POTASSIUM CHLORIDE 20 MEQ: 200 INJECTION, SOLUTION INTRAVENOUS at 06:08

## 2020-10-10 RX ADMIN — Medication 1 AMPULE: at 08:38

## 2020-10-10 RX ADMIN — LISINOPRIL 20 MG: 20 TABLET ORAL at 08:38

## 2020-10-10 RX ADMIN — Medication 10 ML: at 06:16

## 2020-10-10 NOTE — PROGRESS NOTES
Problem: Mobility Impaired (Adult and Pediatric)  Goal: *Acute Goals and Plan of Care (Insert Text)  Outcome: Progressing Towards Goal  Note: LTG:  (1.)Ms. Daly will move from supine to sit and sit to supine , scoot up and down, and roll side to side with INDEPENDENT within 7 treatment day(s). (2.)Ms. Daly will transfer from bed to chair and chair to bed with INDEPENDENT using the least restrictive device within 7 treatment day(s). (3.)Ms. Daly will ambulate with INDEPENDENT for 500+ feet with the least restrictive device within 7 treatment day(s) while maintaining normal vital signs. (4.)Ms. Daly will perform 3-4 steps with HR and SBA within 7 treatment days for safety ascending and descending stairs. (5.)Ms. Daly will be independent in HEP for B LE strength and mobility within 7 treatment days.    ___________________________________________________________________________________________      PHYSICAL THERAPY: Daily Note and AM 10/10/2020  INPATIENT: PT Visit Days : 3  Payor: Amilcar Mccarthy / Plan: [employer] / Product Type: PPO /       NAME/AGE/GENDER: Filomena Frazier is a 56 y.o. female   PRIMARY DIAGNOSIS: Atherosclerosis of native coronary artery with unstable angina pectoris, unspecified whether native or transplanted heart (HCC) [I25.110]  Paroxysmal A-fib (HCC) [I48.0]  Paroxysmal A-fib (HCC) [I48.0]  Atherosclerosis of native coronary artery of native heart with unstable angina pectoris ([address] [I25.110]  CAD (coronary artery disease) [I25.10]  Paroxysmal atrial fibrillation (HCC) [I48.0] S/P CABG x 4 S/P CABG x 4  Procedure(s) (LRB):  BRONCHOSCOPY ROOM 104 (N/A)  BRONCHIAL ALVEOLAR LAVAGE (N/A)  8 Days Post-Op  ICD-10: Treatment Diagnosis:    · Generalized Muscle Weakness (M62.81)  · Difficulty in walking, Not elsewhere classified (R26.2)   Precaution/Allergies:  Influenza virus vaccines      ASSESSMENT:     Ms. Maria Antonia Lim presents with decreased bed mobility, transfers, ambulation, balance, activity tolerance, strength and overall general functional mobility s/p hospital admission with above, now CABG x 4 on 20. Pt with complications post surgery, extubated  10/8/20  No O2 . Prior to surgery, pt works as hospice RN, independent at baseline. Pt lives alone per chart, pt states \"in-laws are in and out\", spouse . Patient is in recliner and ready to walk. She wants to use the walker as she had a fall a few days ago. She stood with CGA and walked the unit with the walker very slowly but with light CGA. She used the bathroom without much assistance upon return. Steady progress and can try without walker later this afternoon if she is agreeable. Will discuss discharge plans then. This section established at most recent assessment   PROBLEM LIST (Impairments causing functional limitations):  1. Decreased Strength  2. Decreased ADL/Functional Activities  3. Decreased Transfer Abilities  4. Decreased Ambulation Ability/Technique  5. Decreased Balance  6. Decreased Activity Tolerance  7. Decreased Pettis with Home Exercise Program  8. Decreased Cognition   INTERVENTIONS PLANNED: (Benefits and precautions of physical therapy have been discussed with the patient.)  1. Balance Exercise  2. Bed Mobility  3. Family Education  4. Gait Training  5. Home Exercise Program (HEP)  6. Therapeutic Activites  7. Therapeutic Exercise/Strengthening  8. Transfer Training     TREATMENT PLAN: Frequency/Duration: twice daily for duration of hospital stay  Rehabilitation Potential For Stated Goals: Good     REHAB RECOMMENDATIONS (at time of discharge pending progress):    Placement: It is my opinion, based on this patient's performance to date, that Ms. Daly may benefit from participating in 1-2 additional therapy sessions in order to continue to assess for rehab potential and then make recommendation for disposition at discharge.   Equipment:    None at this time              HISTORY:   History of Present Injury/Illness (Reason for Referral):  S/p CABG x 4, see H & P  Past Medical History/Comorbidities:   Ms. Jose Rodriguez  has a past medical history of Anxiety, Borderline diabetes, Endometrial cancer ([facility] Essential hypertension (9/18/2020), Heart failure ([facility] History of anemia, Mixed hyperlipidemia (9/18/2020), Paroxysmal atrial fibrillation ([facility] (9/18/2020), and Sleep apnea. She also has no past medical history of Difficult intubation, Malignant hyperthermia due to anesthesia, Nausea & vomiting, or Pseudocholinesterase deficiency. Ms. Jose Rodriguez  has a past surgical history that includes hx hysterectomy; hx heart catheterization (09/18/2020); hx cyst removal; hx cyst removal; and hx orthopaedic. Social History/Living Environment:   Home Environment: Private residence  # Steps to Enter: 0  One/Two Story Residence: Two story, live on 1st floor  Living Alone: Yes  Support Systems: Child(licha), Family member(s)  Patient Expects to be Discharged to[de-identified] Private residence  Current DME Used/Available at Home: CPAP  Prior Level of Function/Work/Activity:  Lives alone, independent at baseline  Personal Factors:          Sex:  female        Age:  56 y.o. Overall Behavior:  agreeable, confused   Number of Personal Factors/Comorbidities that affect the Plan of Care:  Cancer, DM  1-2: MODERATE COMPLEXITY   EXAMINATION:   Most Recent Physical Functioning:   Gross Assessment:                  Posture:     Balance:    Bed Mobility:     Wheelchair Mobility:     Transfers:  Sit to Stand: Contact guard assistance  Stand to Sit: Stand-by assistance  Gait:     Speed/Lila: Shuffled; Slow  Gait Abnormalities: Decreased step clearance;Shuffling gait; Steppage gait  Distance (ft): 200 Feet (ft)  Assistive Device: Walker, rolling  Ambulation - Level of Assistance: Stand-by assistance;Contact guard assistance      Body Structures Involved:  1. Heart  2. Lungs  3.  Muscles Body Functions Affected:  1. Cardio  2. Respiratory  3. Movement Related Activities and Participation Affected:  1. General Tasks and Demands  2. Mobility  3. Self Care   Number of elements that affect the Plan of Care: 4+: HIGH COMPLEXITY   CLINICAL PRESENTATION:   Presentation: Evolving clinical presentation with changing clinical characteristics: MODERATE COMPLEXITY   CLINICAL DECISION MAKIN [facility] Mobility Inpatient Short Form  How much difficulty does the patient currently have. .. Unable A Lot A Little None   1. Turning over in bed (including adjusting bedclothes, sheets and blankets)? [] 1   [x] 2   [] 3   [] 4   2. Sitting down on and standing up from a chair with arms ( e.g., wheelchair, bedside commode, etc.)   [] 1   [x] 2   [] 3   [] 4   3. Moving from lying on back to sitting on the side of the bed? [] 1   [x] 2   [] 3   [] 4   How much help from another person does the patient currently need. .. Total A Lot A Little None   4. Moving to and from a bed to a chair (including a wheelchair)? [] 1   [] 2   [x] 3   [] 4   5. Need to walk in hospital room? [] 1   [] 2   [x] 3   [] 4   6. Climbing 3-5 steps with a railing? [] 1   [x] 2   [] 3   [] 4   © , Trustees of [address] [place] [address] [address] [address], under license to [employer]. All rights reserved      Score:  Initial: 14 Most Recent: X (Date: -- )    Interpretation of Tool:  Represents activities that are increasingly more difficult (i.e. Bed mobility, Transfers, Gait). Medical Necessity:     · Patient is expected to demonstrate progress in   · strength, range of motion, balance, and coordination  ·  to   · decrease assistance required with overall functional mobility, transfers, ambulation  · .  · Patient demonstrates   · good  ·  rehab potential due to higher previous functional level.   Reason for Services/Other Comments:  · Patient   · continues to require present interventions due to patient's inability to perform bed mobility, transfers, ambulation safely and effectively at prior level of function of independent. · .   Use of outcome tool(s) and clinical judgement create a POC that gives a: Clear prediction of patient's progress: LOW COMPLEXITY            TREATMENT:   (In addition to Assessment/Re-Assessment sessions the following treatments were rendered)   Pre-treatment Symptoms/Complaints:  \"I'm ready\"  Pain: Initial:   Pain Intensity 1: 0  Post Session:  3/10     Therapeutic Activity: (    25 min): Therapeutic activities including Chair transfers, Toilet transfers and Ambulation on level ground to improve mobility, strength, balance, and coordination. Required minimal   to promote static and dynamic balance in standing with light use of the walker. .       Date:  10/09/20 Date:   Date:     ACTIVITY/EXERCISE AM PM AM PM AM PM   LAQ 10        Shoulder shrugs 10        Gluteal sets 10        marching 10        Ankle pumps 10        abduction 10                 B = bilateral; AA = active assistive; A = active; P = passive      Braces/Orthotics/Lines/Etc:   · IV  · monitors  Treatment/Session Assessment:    · Response to Treatment:  tolerated mobility well   · Interdisciplinary Collaboration:   o Physical Therapy Assistant  o Registered Nurse  · After treatment position/precautions:   o Up in chair  o Bed alarm/tab alert on  o Bed/Chair-wheels locked  o Bed in low position  o Call light within reach  o RN notified  o Family at bedside   · Compliance with Program/Exercises: Compliant all of the time  · Recommendations/Intent for next treatment session: \"Next visit will focus on advancements to more challenging activities\".   Total Treatment Duration:  PT Patient Time In/Time Out  Time In: 1010  Time Out: 1035    Christine Hooper, PTA

## 2020-10-10 NOTE — ROUTINE PROCESS
Bedside and Verbal shift change report given to self (oncoming nurse) by José Fuentes RN (offgoing nurse). Report included the following information SBAR, Kardex, OR Summary, Intake/Output, MAR and Recent Results.

## 2020-10-10 NOTE — PROGRESS NOTES
[facility]    Follow-Up on: YURI on CKD    HPI: Pt looks great! No complaints.     ROS:  UTO    Current Facility-Administered Medications   Medication Dose Route Frequency    amLODIPine (NORVASC) tablet 10 mg  10 mg Oral DAILY    potassium chloride (K-DUR, KLOR-CON) SR tablet 40 mEq  40 mEq Oral BID    [START ON 10/11/2020] lisinopriL (PRINIVIL, ZESTRIL) tablet 40 mg  40 mg Oral DAILY    carvediloL (COREG) tablet 6.25 mg  6.25 mg Oral BID WITH MEALS    pantoprazole (PROTONIX) tablet 40 mg  40 mg Oral ACB    Saccharomyces boulardii (FLORASTOR) capsule 500 mg  500 mg Oral BID    insulin glargine (LANTUS) injection 28 Units  28 Units SubCUTAneous QHS    magnesium hydroxide (MILK OF MAGNESIA) 400 mg/5 mL oral suspension 30 mL  30 mL Oral DAILY PRN    alum-mag hydroxide-simeth (MYLANTA) oral suspension 30 mL  30 mL Oral Q4H PRN    magnesium oxide (MAG-OX) tablet 400 mg  400 mg Oral TID PRN    magnesium oxide (MAG-OX) tablet 400 mg  400 mg Oral QID PRN    potassium chloride (K-DUR, KLOR-CON) SR tablet 20 mEq  20 mEq Oral BID PRN    potassium chloride (K-DUR, KLOR-CON) SR tablet 40 mEq  40 mEq Oral BID PRN    insulin lispro (HUMALOG) injection 0-10 Units  0-10 Units SubCUTAneous AC&HS    hydrocortisone (ANUSOL-HC) 2.5 % rectal cream   PeriANAL QID PRN    hydrALAZINE (APRESOLINE) 20 mg/mL injection 20 mg  20 mg IntraVENous Q4H PRN    sodium chloride (NS) flush 30 mL  30 mL InterCATHeter Q8H    sodium chloride (NS) flush 30 mL  30 mL InterCATHeter PRN    traMADoL (ULTRAM) tablet 100 mg  100 mg Oral Q6H PRN    oxyCODONE-acetaminophen (PERCOCET) 5-325 mg per tablet 1 Tab  1 Tab Oral Q4H PRN    alcohol 62% (NOZIN) nasal  1 Ampule  1 Ampule Topical Q12H    rosuvastatin (CRESTOR) tablet 40 mg  40 mg Oral QHS    sodium chloride (NS) flush 5-40 mL  5-40 mL IntraVENous Q8H    sodium chloride (NS) flush 5-40 mL  5-40 mL IntraVENous PRN    amiodarone (CORDARONE) tablet 200 mg  200 mg Oral Q12H  ondansetron (ZOFRAN) injection 4 mg  4 mg IntraVENous Q4H PRN    aspirin chewable tablet 81 mg  81 mg Oral DAILY    acetaminophen (TYLENOL) tablet 650 mg  650 mg Oral Q4H PRN       Exam:  Vitals:    10/10/20 0709 10/10/20 0725 10/10/20 1154 10/10/20 1331   BP: (!) 150/84 121/81 (!) 171/74 (!) 150/65   Pulse: (!) 187 75 67 67   Resp:  22     Temp:  97 °F (36.1 °C)     SpO2:  99%     Weight:       Height:             Intake/Output Summary (Last 24 hours) at 10/10/2020 1340  Last data filed at 10/10/2020 0857  Gross per 24 hour   Intake 930 ml   Output 600 ml   Net 330 ml     PE:  GEN - in no distress  CV - regular, no murmur, no rub  Lung - clear bilaterally  Abd - soft, nontender  Ext - no edema    Labs  Recent Labs     10/10/20  0316 10/09/20  0344 10/08/20  0258   WBC 15.9* 27.6* 25.6*   HGB 9.5* 9.9* 10.5*   HCT 28.7* 29.9* 32.1*    452* 337     Recent Labs     10/10/20  0526 10/10/20  0316 10/09/20  1700 10/09/20  0857 10/09/20  0344 10/08/20  0258   NA  --  140  --  140 141 145   K 2.8* 2.6* 4.2 3.5 2.5* 3.4*   CL  --  104  --  103 103 106   CO2  --  29  --  30 31 36*   BUN  --  35*  --  38* 41* 53*   CREA  --  1.54*  --  1.66* 1.75* 2.60*   GLU  --  143*  --  175* 164* 282*   CA  --  8.0*  --  8.6 8.5 8.7   MG  --  2.5*  --   --  2.6* 2.9*   PHOS  --   --   --   --  3.1  --      No results for input(s): PH, PCO2, PO2, PCO2 in the last 72 hours.     Problem List:  Patient Active Problem List    Diagnosis Date Noted    YURI (acute kidney injury) ([facility] 10/05/2020    Pulmonary edema 10/01/2020    Anxiety 10/01/2020    AMOR on CPAP 09/30/2020    Paroxysmal A-fib ([facility] 09/29/2020    Atherosclerosis of native coronary artery of native heart with unstable angina pectoris ([facility] 09/29/2020    S/P CABG x 4 09/29/2020    Suspected sleep apnea 09/29/2020    CAD (coronary artery disease) 09/29/2020    Paroxysmal atrial fibrillation ([facility] 09/18/2020    Essential hypertension 09/18/2020    Mixed hyperlipidemia 09/18/2020    Uterine cancer ([facility] [facility] 09/18/2020    Chest pain 09/18/2020    Atrial fibrillation ([facility] [address] 09/18/2020       Issues Addressed By Nephrology:    Plan:  1. S/P CABG X4 V  2. Resp Failure  3. Pulmonary edema  4. YURI on CKD stable   5. HTN may restart home BP meds including Lisinopril  6. Hypokalemia place on oral K bid may need aldactone.   Continue supportive care

## 2020-10-10 NOTE — PROGRESS NOTES
Today's Date: 10/10/2020  Date of Admission: 9/29/2020    Chart Reviewed. Subjective / Interval Events:     Patient feels better today. She was able to walk with PT. Profound hypokalemia. Currently receiving IV K repletion. Also has bradycardia, NSR in 50s, and hypertension. Medications Reviewed. Objective:     Vitals:    10/10/20 0326 10/10/20 0346 10/10/20 0354 10/10/20 0400   BP: (!) 198/81 (!) 150/66     Pulse: (!) 59 68 76    Resp:       Temp:       SpO2:   98%    Weight:    82.1 kg (181 lb)   Height:           Intake and Output  Current Shift: 10/09 1901 - 10/10 0700  In: -   Out: 400 [Urine:400]   Last 3 Shifts: 10/08 0701 - 10/09 1900  In: 2070.9 [P.O.:1530; I.V.:540.9]  Out: 2000 [Urine:1800]    Physical Exam:  General: Well Developed, Obese, No Acute Distress, Alert & Oriented x 3, Appropriate mood  Neck: supple, no JVD  Heart: S1S2 with RRR without murmurs or gallops  Lungs: Clear throughout auscultation bilaterally  Abd: soft, nontender, nondistended, with good bowel sounds  Ext: no edema bilaterally  Sternal incision: clean, dry, and intact  Skin: warm and dry    LABS  Data Review:   Recent Labs     10/10/20  0526 10/10/20  0316  10/09/20  0857 10/09/20  0344   NA  --  140  --  140 141   K 2.8* 2.6*   < > 3.5 2.5*   MG  --  2.5*  --   --  2.6*   BUN  --  35*  --  38* 41*   CREA  --  1.54*  --  1.66* 1.75*   GLU  --  143*  --  175* 164*   WBC  --  15.9*  --   --  27.6*   HGB  --  9.5*  --   --  9.9*   HCT  --  28.7*  --   --  29.9*   PLT  --  397  --   --  452*    < > = values in this interval not displayed. Estimated Creatinine Clearance: 38.4 mL/min (A) (based on SCr of 1.54 mg/dL (H)).       Assessment/Plan:     Principal Problem:    S/P CABG x 4 (9/29/2020)  Was re-intubated 10/2, now extubated and on room air, afebrile, cultures neg to date, no DVT in upper or lower extremities, on IV antibiotics, ID following      Active Problems:    Paroxysmal atrial fibrillation ([facility] [address] (9/18/2020)  In sinus rhythm currently, intermittent a-fib 10/7        [facility]mal [facility] (9/29/2020)    S/p MAZE, in sinus currently        Atherosclerosis of native coronary artery of native heart with unstable angina pectoris ([facility] [address] (9/29/2020)          CAD (coronary artery disease) (9/29/2020)  S/p CABG        AMOR on CPAP (9/30/2020)       Pulmonary edema (10/1/2020)  Improved       Anxiety (10/1/2020)          Acute respiratory failure with hypoxia ([facility] (10/2/2020)  Extubated, resolved        Bilateral pulmonary infiltrates on chest x-ray (10/2/2020)  Chest xray improved, stopped lasix drip given worsening renal function       Hypotension (10/3/2020)  Resolved      Bradycardia (10/10/2020)  Hold metoprolol. Continue amiodarone for now. Will reevaluate off metoprolol.       YURI (acute kidney injury) ([facility] (10/5/2020)  Renal function much improved, nephrology following      DM  Was not on any meds at home, Hgb A1C 8.2 pre-op, on Lantus, continue to titrate as needed     Hypertension  BP remains elevated today, ACE-I resumed by nephrology. Added 10 mg Norvasc this AM.  No beta blocker due to bradycardia.       Genny Kendall MD

## 2020-10-10 NOTE — CONSULTS
[facility] Cardiology Consultation        Date of  Admission: 2020  5:22 AM     Primary Care Physician: None  Primary Cardiologist: Dr Juan Schwartz MD  Referring Physician: Dr Shannon Eli MD  Consulting Physician: Dr Jena Pro MD    CC/Reason for consult: Atrial fibrillation with RVR    HPI:  Keith Ochoa is a 56 y.o. female with PMH of paroxysmal atrial fibrillation s/p MAZE with ablation and closure of MADI on 20, CAD s/p CABG x 4 on 20, NSTEMI, HTN, HLD, newly diagnosed this admission with DM (Hgb A1c 8.2), AMOR on CPAP who was admitted for CABG. Patient initially presented to [facility] ED on  with chest pain and dyspnea. She was started on Cardizem and chest pain resolved with improved HR. BP was also elevated at 220/140. She was admitted and cardizem drip was continued. She converted to SB in the 40s overnight and the cardizem was stopped. Initial HS trop was 126.9 and repeat was 3902. Patient underwent cardiac catheterization by Dr. Juan Schwartz. Patient was found to have MVD. Patient underwent CABG x 4 and MAZE procedure with ablation and closure of MADI on . During hospitalization patient converted from NSR to atrial fibrillation with RVR with rate of 170's. Patient was given Amiodarone bolus and started on Amiodarone gtt. Was in atrial fibrillation for about one hour and then converted to NSR. Patient states when she is out of rhythm she has chest pain and . Denies any palpitations.        Past Medical History:   Diagnosis Date    Anxiety     Borderline diabetes     [facility]rial [facility] [address]     Essential hypertension 2020    Heart failure (HCC)     EF 55-60% ECHO 20    History of anemia     Mixed hyperlipidemia 2020    Paroxysmal atrial fibrillation ([facility] [address] 2020- SB at [facility]  appt 20    Sleep apnea     uses CPAP QHS      Past Surgical History:   Procedure Laterality Date    HX CYST REMOVAL      from scalp as kid    HX CYST REMOVAL      HX HEART CATHETERIZATION  09/18/2020    HX HYSTERECTOMY      HX ORTHOPAEDIC      left elbow surgery       Allergies   Allergen Reactions    Influenza Virus Vaccines Other (comments)     Gives her the flu.       Social History     Socioeconomic History    Marital status: SINGLE     Spouse name: Not on file    Number of children: Not on file    Years of education: Not on file    Highest education level: Not on file   Occupational History    Not on file   Social Needs    Financial resource strain: Not on file    Food insecurity     Worry: Not on file     Inability: Not on file    Transportation needs     Medical: Not on file     Non-medical: Not on file   Tobacco Use    Smoking status: Current Every Day Smoker     Packs/day: 0.50    Smokeless tobacco: Never Used   Substance and Sexual Activity    Alcohol use: Not Currently    Drug use: Not on file    Sexual activity: Not on file   Lifestyle    Physical activity     Days per week: Not on file     Minutes per session: Not on file    Stress: Not on file   Relationships    Social connections     Talks on phone: Not on file     Gets together: Not on file     Attends Sabianist service: Not on file     Active member of club or organization: Not on file     Attends meetings of clubs or organizations: Not on file     Relationship status: Not on file    Intimate partner violence     Fear of current or ex partner: Not on file     Emotionally abused: Not on file     Physically abused: Not on file     Forced sexual activity: Not on file   Other Topics Concern    Not on file   Social History Narrative    Not on file     Family History   Problem Relation Age of Onset    Coronary Artery Disease Mother     Cancer Mother     Hypertension Mother     Coronary Artery Disease Father     Hypertension Father     Coronary Artery Disease Brother     Hypertension Brother     Lung Disease Brother         Current Facility-Administered Medications   Medication Dose Route Frequency    amLODIPine (NORVASC) tablet 10 mg  10 mg Oral DAILY    amiodarone (CORDARONE) 450 mg in dextrose 5% 250 mL infusion  0.5-1 mg/min IntraVENous TITRATE    potassium chloride (K-DUR, KLOR-CON) SR tablet 40 mEq  40 mEq Oral BID    pantoprazole (PROTONIX) tablet 40 mg  40 mg Oral ACB    Saccharomyces boulardii (FLORASTOR) capsule 500 mg  500 mg Oral BID    lisinopriL (PRINIVIL, ZESTRIL) tablet 20 mg  20 mg Oral DAILY    insulin glargine (LANTUS) injection 28 Units  28 Units SubCUTAneous QHS    magnesium hydroxide (MILK OF MAGNESIA) 400 mg/5 mL oral suspension 30 mL  30 mL Oral DAILY PRN    alum-mag hydroxide-simeth (MYLANTA) oral suspension 30 mL  30 mL Oral Q4H PRN    magnesium oxide (MAG-OX) tablet 400 mg  400 mg Oral TID PRN    magnesium oxide (MAG-OX) tablet 400 mg  400 mg Oral QID PRN    potassium chloride (K-DUR, KLOR-CON) SR tablet 20 mEq  20 mEq Oral BID PRN    potassium chloride (K-DUR, KLOR-CON) SR tablet 40 mEq  40 mEq Oral BID PRN    insulin lispro (HUMALOG) injection 0-10 Units  0-10 Units SubCUTAneous AC&HS    hydrocortisone (ANUSOL-HC) 2.5 % rectal cream   PeriANAL QID PRN    hydrALAZINE (APRESOLINE) 20 mg/mL injection 20 mg  20 mg IntraVENous Q4H PRN    sodium chloride (NS) flush 30 mL  30 mL InterCATHeter Q8H    sodium chloride (NS) flush 30 mL  30 mL InterCATHeter PRN    traMADoL (ULTRAM) tablet 100 mg  100 mg Oral Q6H PRN    oxyCODONE-acetaminophen (PERCOCET) 5-325 mg per tablet 1 Tab  1 Tab Oral Q4H PRN    alcohol 62% (NOZIN) nasal  1 Ampule  1 Ampule Topical Q12H    rosuvastatin (CRESTOR) tablet 40 mg  40 mg Oral QHS    sodium chloride (NS) flush 5-40 mL  5-40 mL IntraVENous Q8H    sodium chloride (NS) flush 5-40 mL  5-40 mL IntraVENous PRN    amiodarone (CORDARONE) tablet 200 mg  200 mg Oral Q12H    ondansetron (ZOFRAN) injection 4 mg  4 mg IntraVENous Q4H PRN    aspirin chewable tablet 81 mg  81 mg Oral DAILY    acetaminophen (TYLENOL) tablet 650 mg 650 mg Oral Q4H PRN     Review of Symptoms:  General: Positive for generalized weakness. No weight changes, fever or chills  Skin: no rashes, lumps, or other skin changes  HEENT: no headache, dizziness, lightheadedness, vision changes, hearing changes, tinnitus, vertigo, sinus pressure/pain, bleeding gums, sore throat, or hoarseness  Neck: no swollen glands, goiter, pain or stiffness  Respiratory: Positive for dyspnea. No cough, sputum, hemoptysis, wheezing  Cardiovascular: + as per HPI  Gastrointestinal: no GERD, constipation, diarrhea, liver problems, or h/o GI bleed  Urinary: no frequency, urgency , hematuria, burning/pain with urination, recent flank pain, polyuria, nocturia, or difficulty urinating  Peripheral Vascular: no claudication, leg cramps, prior DVTs, swelling of calves, legs, or feet, color change, or swelling with redness or tenderness  Musculoskeletal: no muscle or joint pain/stiffness, joint swelling, erythema of joints, or back pain  Psychiatric: no depression or excessive stress  Neurological: no sensory or motor loss, seizures, syncope, tremors, numbness, no dementia  Hematologic: no anemia, easy bruising or bleeding  Endocrine: Positive for do diabetes.  No thyroid problems, heat or cold intolerance, excessive sweating, polyuria, polydipsia       Subjective:     Physical Exam:    Vitals:    10/10/20 0701 10/10/20 0709 10/10/20 0725 10/10/20 1154   BP: (!) 144/81 (!) 150/84 121/81 (!) 171/74   Pulse: (!) 183 (!) 187 75 67   Resp:   22    Temp:   97 °F (36.1 °C)    SpO2: 97%  99%    Weight:       Height:         General: Well Developed, Well Nourished, No Acute Distress  HEENT: pupils equal and round, no abnormalities noted  Neck: supple, no JVD, no carotid bruits  Heart: S1S2 with RRR without murmurs or gallops  Lungs: Clear throughout auscultation bilaterally without adventitious sounds  Abd: soft, nontender, nondistended, with good bowel sounds  Ext: warm, no edema, calves supple/nontender, pulses 2+ bilaterally  Skin: warm and dry  Psychiatric: Normal mood and affect  Neurologic: Alert and oriented X 3    Cardiographics    Telemetry: normal sinus rhythm  LEFT VENTRICLE: Size was normal. Systolic function was hyperdynamic. Ejection fraction was estimated to be greater than 75 %. There were no regional wall motion abnormalities. Wall thickness was normal. The study was not technically sufficient to allow evaluation of LV diastolic function.     RIGHT VENTRICLE: Not well visualized but appeared normal function in parasternal views. The tricuspid jet envelope definition was inadequate for estimation of RV systolic pressure.     LEFT ATRIUM: The atrium was mildly dilated.     RIGHT ATRIUM: Size was normal.     SYSTEMIC VEINS: IVC: The inferior vena cava was normal in size and course. The inferior vena cava was not visualized due to surgical bandages.     AORTIC VALVE: The valve was not well visualized. Transaortic velocity was increased likely related to the hyperdynamic function. Indices not consistent with significant stenosis. There was no evidence for stenosis. There was no insufficiency.     MITRAL VALVE: Valve structure was normal. There was no evidence for stenosis. There was no regurgitation.     TRICUSPID VALVE: The valve structure was normal. There was no evidence for stenosis. There was no regurgitation.     PULMONIC VALVE: Not well visualized. There was trivial regurgitation.     PERICARDIUM: There was no pericardial effusion.     AORTA: The root exhibited normal size.     Labs:   Recent Results (from the past 24 hour(s))   POTASSIUM    Collection Time: 10/09/20  5:00 PM   Result Value Ref Range    Potassium 4.2 3.5 - 5.1 mmol/L   GLUCOSE, POC    Collection Time: 10/09/20  5:08 PM   Result Value Ref Range    Glucose (POC) 225 (H) 65 - 100 mg/dL   GLUCOSE, POC    Collection Time: 10/09/20 10:09 PM   Result Value Ref Range    Glucose (POC) 176 (H) 65 - 255 mg/dL   METABOLIC PANEL, BASIC Collection Time: 10/10/20  3:16 AM   Result Value Ref Range    Sodium 140 136 - 145 mmol/L    Potassium 2.6 (LL) 3.5 - 5.1 mmol/L    Chloride 104 98 - 107 mmol/L    CO2 29 21 - 32 mmol/L    Anion gap 7 7 - 16 mmol/L    Glucose 143 (H) 65 - 100 mg/dL    BUN 35 (H) 8 - 23 MG/DL    Creatinine 1.54 (H) 0.6 - 1.0 MG/DL    GFR est AA 44 (L) >60 ml/min/1.73m2    GFR est non-AA 36 (L) >60 ml/min/1.73m2    Calcium 8.0 (L) 8.3 - 10.4 MG/DL   MAGNESIUM    Collection Time: 10/10/20  3:16 AM   Result Value Ref Range    Magnesium 2.5 (H) 1.8 - 2.4 mg/dL   CBC W/O DIFF    Collection Time: 10/10/20  3:16 AM   Result Value Ref Range    WBC 15.9 (H) 4.3 - 11.1 K/uL    RBC 3.31 (L) 4.05 - 5.2 M/uL    HGB 9.5 (L) 11.7 - 15.4 g/dL    HCT 28.7 (L) 35.8 - 46.3 %    MCV 86.7 79.6 - 97.8 FL    MCH 28.7 26.1 - 32.9 PG    MCHC 33.1 31.4 - 35.0 g/dL    RDW 14.8 (H) 11.9 - 14.6 %    PLATELET 288 456 - 211 K/uL    MPV 10.7 9.4 - 12.3 FL    ABSOLUTE NRBC 0.00 0.0 - 0.2 K/uL   POTASSIUM    Collection Time: 10/10/20  5:26 AM   Result Value Ref Range    Potassium 2.8 (LL) 3.5 - 5.1 mmol/L   GLUCOSE, POC    Collection Time: 10/10/20  6:14 AM   Result Value Ref Range    Glucose (POC) 168 (H) 65 - 100 mg/dL   GLUCOSE, POC    Collection Time: 10/10/20 11:10 AM   Result Value Ref Range    Glucose (POC) 246 (H) 65 - 100 mg/dL     Pt has been seen and examined by Dr. Sari Nash. He agrees with the following assessment and plan.      Assessment/Plan:      Principal Problem:    CAD S/P CABG x 4 (9/29/2020)- per primary     Active Problems:    Paroxysmal atrial fibrillation ([facility] [address] (9/18/2020)- s/p MAZE and MADI closure- currently NSR, converted with IV amiodarone bolus and gtt, cont PO amiodarone, home Xarelto on hold- recommend starting back once ok with primary       Bradycardia- monitor on telemetry, home BB held      HTN- ACE-I resumes, cont Norvasc       Hypokalemia- replacing         Acute Respiratory failure s/p extubation- on NC- per pulmonary       AMOR on CPAP (9/30/2020)      Pulmonary edema (10/1/2020)- improved, per primary       Anxiety (10/1/2020)      YURI (acute kidney injury) ([facility] [address] (10/5/2020)- nephrology following       DM- per primary     Thank you for consulting [facility] Cardiology and allowing us to participate in the care of this patient. We will continue to follow along with you.       Reji Kapoor PA-C

## 2020-10-10 NOTE — PROGRESS NOTES
Bedside shift change report given to [address] [place] [address] [place] (oncoming nurse) by Ricki Terry RN (offgoing nurse). Report included the following information SBAR, Kardex, Intake/Output, MAR, Recent Results and Cardiac Rhythm afib with RVR.

## 2020-10-10 NOTE — PROGRESS NOTES
Problem: Mobility Impaired (Adult and Pediatric)  Goal: *Acute Goals and Plan of Care (Insert Text)  Outcome: Progressing Towards Goal  Note: LTG:  (1.)Ms. Daly will move from supine to sit and sit to supine , scoot up and down, and roll side to side with INDEPENDENT within 7 treatment day(s). (2.)Ms. Daly will transfer from bed to chair and chair to bed with INDEPENDENT using the least restrictive device within 7 treatment day(s). (3.)Ms. Daly will ambulate with INDEPENDENT for 500+ feet with the least restrictive device within 7 treatment day(s) while maintaining normal vital signs. (4.)Ms. Daly will perform 3-4 steps with HR and SBA within 7 treatment days for safety ascending and descending stairs. (5.)Ms. Daly will be independent in HEP for B LE strength and mobility within 7 treatment days.    ___________________________________________________________________________________________      PHYSICAL THERAPY: Daily Note and PM 10/10/2020  INPATIENT: PT Visit Days : 3  Payor: Danial Runner / Plan: [facility] / Product Type: PPO /       NAME/AGE/GENDER: Rakesh Eddy is a 56 y.o. female   PRIMARY DIAGNOSIS: Atherosclerosis of native coronary artery with unstable angina pectoris, unspecified whether native or transplanted heart (HCC) [I25.110]  Paroxysmal A-fib (HCC) [I48.0]  Paroxysmal A-fib (HCC) [I48.0]  Atherosclerosis of native coronary artery of native heart with unstable angina pectoris ([address] [I25.110]  CAD (coronary artery disease) [I25.10]  Paroxysmal atrial fibrillation (HCC) [I48.0] S/P CABG x 4 S/P CABG x 4  Procedure(s) (LRB):  BRONCHOSCOPY ROOM 104 (N/A)  BRONCHIAL ALVEOLAR LAVAGE (N/A)  8 Days Post-Op  ICD-10: Treatment Diagnosis:    · Generalized Muscle Weakness (M62.81)  · Difficulty in walking, Not elsewhere classified (R26.2)   Precaution/Allergies:  Influenza virus vaccines      ASSESSMENT:     Ms. Juan Manuel Benjamin presents with decreased bed mobility, transfers, ambulation, balance, activity tolerance, strength and overall general functional mobility s/p hospital admission with above, now CABG x 4 on 20. Pt with complications post surgery, extubated  10/8/20  No O2 . Prior to surgery, pt works as hospice RN, independent at baseline. Pt lives alone per chart, pt states \"in-laws are in and out\", spouse . Patient is in recliner and ready to walk. She wants to use the walker as she had a fall a few days ago. She stood with CGA and walked the unit with the walker very slowly but with light CGA. She used the bathroom without much assistance upon return. Steady progress and can try without walker later this afternoon if she is agreeable. Will discuss discharge plans then. PM:  Patient was up using the bathroom upon entering room and ready to walk. She increased her gait distance some and did not use the walker this afternoon. She walks with improved stride length and upright posture without walker. She had a fall earlier in her stay and has confidence issues. She lied down into a flat bed with a perfect log roll technique. Steady progress. Per her daughter the family can help at home so patient is not alone. This section established at most recent assessment   PROBLEM LIST (Impairments causing functional limitations):  1. Decreased Strength  2. Decreased ADL/Functional Activities  3. Decreased Transfer Abilities  4. Decreased Ambulation Ability/Technique  5. Decreased Balance  6. Decreased Activity Tolerance  7. Decreased [place] with Home Exercise Program  8. Decreased Cognition   INTERVENTIONS PLANNED: (Benefits and precautions of physical therapy have been discussed with the patient.)  1. Balance Exercise  2. Bed Mobility  3. Family Education  4. Gait Training  5. Home Exercise Program (HEP)  6. Therapeutic Activites  7. Therapeutic Exercise/Strengthening  8.  Transfer Training     TREATMENT PLAN: Frequency/Duration: twice daily for duration of hospital stay  Rehabilitation Potential For Stated Goals: Good     REHAB RECOMMENDATIONS (at time of discharge pending progress):    Placement: It is my opinion, based on this patient's performance to date, that Ms. Daly may benefit from [address] after discharge due to the functional deficits listed above that are likely to improve with skilled rehabilitation because he/she has multiple medical issues that affect his/her functional mobility in the community. Equipment:    None at this time              HISTORY:   History of Present Injury/Illness (Reason for Referral):  S/p CABG x 4, see H & P  Past Medical History/Comorbidities:   Ms. Marty Cyr  has a past medical history of Anxiety, Borderline diabetes, Endometrial cancer ([facility] [address] Essential hypertension (9/18/2020), Heart failure ([facility] [address] History of anemia, Mixed hyperlipidemia (9/18/2020), Paroxysmal atrial fibrillation ([facility] [address] (9/18/2020), and Sleep apnea. She also has no past medical history of Difficult intubation, Malignant hyperthermia due to anesthesia, Nausea & vomiting, or Pseudocholinesterase deficiency. Ms. Marty Cyr  has a past surgical history that includes hx hysterectomy; hx heart catheterization (09/18/2020); hx cyst removal; hx cyst removal; and hx orthopaedic. Social History/Living Environment:   Home Environment: Private residence  # Steps to Enter: 0  One/Two Story Residence: Two story, live on 1st floor  Living Alone: Yes  Support Systems: Child(licha), Family member(s)  Patient Expects to be Discharged to[de-identified] Private residence  Current DME Used/Available at Home: CPAP  Prior Level of Function/Work/Activity:  Lives alone, independent at baseline  Personal Factors:          Sex:  female        Age:  56 y.o.         Overall Behavior:  agreeable, confused   Number of Personal Factors/Comorbidities that affect the Plan of Care:  Cancer, DM  1-2: MODERATE COMPLEXITY   EXAMINATION:   Most Recent Physical Functioning:   Gross Assessment:                  Posture:     Balance:    Bed Mobility:  Sit to Supine: Modified independent  Wheelchair Mobility:     Transfers:  Sit to Stand: Contact guard assistance  Stand to Sit: Stand-by assistance  Gait:     Speed/Lila: Shuffled; Slow  Gait Abnormalities: Decreased step clearance;Shuffling gait; Steppage gait  Distance (ft): 300 Feet (ft)  Assistive Device: Walker, rolling  Ambulation - Level of Assistance: Stand-by assistance;Contact guard assistance      Body Structures Involved:  1. Heart  2. Lungs  3. Muscles Body Functions Affected:  1. Cardio  2. Respiratory  3. Movement Related Activities and Participation Affected:  1. General Tasks and Demands  2. Mobility  3. Self Care   Number of elements that affect the Plan of Care: 4+: HIGH COMPLEXITY   CLINICAL PRESENTATION:   Presentation: Evolving clinical presentation with changing clinical characteristics: MODERATE COMPLEXITY   CLINICAL DECISION MAKIN [facility] Inpatient Short Form  How much difficulty does the patient currently have. .. Unable A Lot A Little None   1. Turning over in bed (including adjusting bedclothes, sheets and blankets)? [] 1   [x] 2   [] 3   [] 4   2. Sitting down on and standing up from a chair with arms ( e.g., wheelchair, bedside commode, etc.)   [] 1   [x] 2   [] 3   [] 4   3. Moving from lying on back to sitting on the side of the bed? [] 1   [x] 2   [] 3   [] 4   How much help from another person does the patient currently need. .. Total A Lot A Little None   4. Moving to and from a bed to a chair (including a wheelchair)? [] 1   [] 2   [x] 3   [] 4   5. Need to walk in hospital room? [] 1   [] 2   [x] 3   [] 4   6. Climbing 3-5 steps with a railing? [] 1   [x] 2   [] 3   [] 4   © , Trustees of [address] [place] [address] [address] [address], under license to [employer].  All rights reserved      Score:  Initial: 14 Most Recent: X (Date: -- ) Interpretation of Tool:  Represents activities that are increasingly more difficult (i.e. Bed mobility, Transfers, Gait). Medical Necessity:     · Patient is expected to demonstrate progress in   · strength, range of motion, balance, and coordination  ·  to   · decrease assistance required with overall functional mobility, transfers, ambulation  · .  · Patient demonstrates   · good  ·  rehab potential due to higher previous functional level. Reason for Services/Other Comments:  · Patient   · continues to require present interventions due to patient's inability to perform bed mobility, transfers, ambulation safely and effectively at prior level of function of independent. · .   Use of outcome tool(s) and clinical judgement create a POC that gives a: Clear prediction of patient's progress: LOW COMPLEXITY            TREATMENT:   (In addition to Assessment/Re-Assessment sessions the following treatments were rendered)   Pre-treatment Symptoms/Complaints:  \"I'm ready\"  Pain: Initial:   Pain Intensity 1: 0  Post Session:  3/10     Therapeutic Activity: (    25 min): Therapeutic activities including bed transfers, Toilet transfers and Ambulation on level ground to improve mobility, strength, balance, and coordination. Required CGA to promote static and dynamic balance in standing . Rajesh Vieira        Date:  10/09/20 Date:   Date:     ACTIVITY/EXERCISE AM PM AM PM AM PM   LAQ 10        Shoulder shrugs 10        Gluteal sets 10        marching 10        Ankle pumps 10        abduction 10                 B = bilateral; AA = active assistive; A = active; P = passive      Braces/Orthotics/Lines/Etc:   · IV  Treatment/Session Assessment:    · Response to Treatment:  tolerated mobility well   · Interdisciplinary Collaboration:   o Physical Therapy Assistant  o Registered Nurse  · After treatment position/precautions:   o Supine in bed  o Bed/Chair-wheels locked  o Bed in low position  o Call light within reach  o RN notified  o Family at bedside   · Compliance with Program/Exercises: Compliant all of the time  · Recommendations/Intent for next treatment session: \"Next visit will focus on advancements to more challenging activities\".   Total Treatment Duration:  PT Patient Time In/Time Out  Time In: 1350  Time Out: 1415    Nelida Brock, PTA

## 2020-10-10 NOTE — PROGRESS NOTES
Critical Care Daily Progress Note: 10/10/2020  Admission Date: 9/29/2020     The patient's chart is reviewed and the patient is discussed with the staff. 57 y/o female Patient had CABG x 4 on 9/29. Currently is sedated in CV-ICU and orally intubated receiving  mechanical ventilation. Pt presented to the ER at [facility] on 9/18/2020 with chest pain and dyspnea. She was found to be in afib RVR. She was started on cardizem and admitted by cardiology. She underwent LHC by Dr. Ulysses Irizarry which revealed MVCAD. She was extubated on day of surgery but reintubated 10/2  Subjective:    Placed on 2 LPM via NC after conversion to A fib, but now back on RA. Amio bolus after conversion and converted back to NSR. Drip infusing now.      Current Facility-Administered Medications   Medication Dose Route Frequency    amLODIPine (NORVASC) tablet 10 mg  10 mg Oral DAILY    amiodarone (CORDARONE) 450 mg in dextrose 5% 250 mL infusion  0.5-1 mg/min IntraVENous TITRATE    pantoprazole (PROTONIX) tablet 40 mg  40 mg Oral ACB    Saccharomyces boulardii (FLORASTOR) capsule 500 mg  500 mg Oral BID    lisinopriL (PRINIVIL, ZESTRIL) tablet 20 mg  20 mg Oral DAILY    insulin glargine (LANTUS) injection 28 Units  28 Units SubCUTAneous QHS    magnesium hydroxide (MILK OF MAGNESIA) 400 mg/5 mL oral suspension 30 mL  30 mL Oral DAILY PRN    alum-mag hydroxide-simeth (MYLANTA) oral suspension 30 mL  30 mL Oral Q4H PRN    magnesium oxide (MAG-OX) tablet 400 mg  400 mg Oral TID PRN    magnesium oxide (MAG-OX) tablet 400 mg  400 mg Oral QID PRN    potassium chloride (K-DUR, KLOR-CON) SR tablet 20 mEq  20 mEq Oral BID PRN    potassium chloride (K-DUR, KLOR-CON) SR tablet 40 mEq  40 mEq Oral BID PRN    insulin lispro (HUMALOG) injection 0-10 Units  0-10 Units SubCUTAneous AC&HS    hydrocortisone (ANUSOL-HC) 2.5 % rectal cream   PeriANAL QID PRN    hydrALAZINE (APRESOLINE) 20 mg/mL injection 20 mg  20 mg IntraVENous Q4H PRN    sodium chloride (NS) flush 30 mL  30 mL InterCATHeter Q8H    sodium chloride (NS) flush 30 mL  30 mL InterCATHeter PRN    traMADoL (ULTRAM) tablet 100 mg  100 mg Oral Q6H PRN    oxyCODONE-acetaminophen (PERCOCET) 5-325 mg per tablet 1 Tab  1 Tab Oral Q4H PRN    alcohol 62% (NOZIN) nasal  1 Ampule  1 Ampule Topical Q12H    rosuvastatin (CRESTOR) tablet 40 mg  40 mg Oral QHS    sodium chloride (NS) flush 5-40 mL  5-40 mL IntraVENous Q8H    sodium chloride (NS) flush 5-40 mL  5-40 mL IntraVENous PRN    amiodarone (CORDARONE) tablet 200 mg  200 mg Oral Q12H    ondansetron (ZOFRAN) injection 4 mg  4 mg IntraVENous Q4H PRN    aspirin chewable tablet 81 mg  81 mg Oral DAILY    acetaminophen (TYLENOL) tablet 650 mg  650 mg Oral Q4H PRN     Review of Systems  Review of Systems   Constitutional: Negative for chills, fever and malaise/fatigue. Respiratory: Positive for shortness of breath. After conversion to A fib early this AM, now back in NSR   Cardiovascular: Positive for palpitations (converted to A fib this AM, back in NSR now). Gastrointestinal: Negative for abdominal pain, constipation, diarrhea, nausea and vomiting. Skin:        Surgical wounds     Neurological: Negative for dizziness and headaches. Psychiatric/Behavioral: Negative for depression.          Objective:     Vitals:    10/10/20 0648 10/10/20 0701 10/10/20 0709 10/10/20 0725   BP: (!) 146/97 (!) 144/81 (!) 150/84 121/81   Pulse: (!) 165 (!) 183 (!) 187 75   Resp:    22   Temp:    97 °F (36.1 °C)   SpO2: 98% 97%  99%   Weight:       Height:           Intake/Output Summary (Last 24 hours) at 10/10/2020 3651  Last data filed at 10/9/2020 2125  Gross per 24 hour   Intake 450 ml   Output 800 ml   Net -350 ml     Physical Exam:            EENMT:  Sclera clear, pupils equal, oral mucosa moist  Respiratory: clear, on RA  Cardiovascular:  RRR with no M,G,R;  Gastrointestinal:  soft with no tenderness; positive bowel sounds present  Musculoskeletal:  warm with no cyanosis, no lower extremity edema  Skin:  no jaundice or ecchymosis  Neurologic: alert and oriented x 4  Psychiatric:  Calm , cooperative     CXR: improved infiltrates today          Ventilat  ABG:   Recent Labs     10/08/20  0345 10/07/20  1401 10/07/20  1229   PHI 7.52* 7.54* 7.56*   PCO2I 44.4 41.9 38.6   PO2I 81 64* 68*   HCO3I 35.9* 35.9* 34.3*     LAB  Recent Labs     10/10/20  0614 10/09/20  2209 10/09/20  1708 10/09/20  1134 10/09/20  0644   GLUCPOC 168* 176* 225* 270* 155*     Recent Labs     10/10/20  0316 10/09/20  0344 10/08/20  0258   WBC 15.9* 27.6* 25.6*   HGB 9.5* 9.9* 10.5*   HCT 28.7* 29.9* 32.1*    452* 337     Recent Labs     10/10/20  0526 10/10/20  0316 10/09/20  1700 10/09/20  0857 10/09/20  0344 10/08/20  0258   NA  --  140  --  140 141 145   K 2.8* 2.6* 4.2 3.5 2.5* 3.4*   CL  --  104  --  103 103 106   CO2  --  29  --  30 31 36*   GLU  --  143*  --  175* 164* 282*   BUN  --  35*  --  38* 41* 53*   CREA  --  1.54*  --  1.66* 1.75* 2.60*   MG  --  2.5*  --   --  2.6* 2.9*   PHOS  --   --   --   --  3.1  --    CA  --  8.0*  --  8.6 8.5 8.7     No results for input(s): LCAD, LAC in the last 72 hours.     Patient Active Problem List   Diagnosis Code    Paroxysmal atrial fibrillation (HCC) I48.0    Essential hypertension I10    Mixed hyperlipidemia E78.2    Uterine cancer ([facility] C55    Chest pain R07.9    Atrial fibrillation (HCC) I48.91    Paroxysmal A-fib (HCC) I48.0    Atherosclerosis of native coronary artery of native heart with unstable angina pectoris ([facility]) I25.110    S/P CABG x 4 Z95.1    Suspected sleep apnea R29.818    CAD (coronary artery disease) I25.10    AMOR on CPAP G47.33, Z99.89    Pulmonary edema J81.1    Anxiety F41.9    Acute respiratory failure with hypoxia ([facility]) J96.01    Bilateral pulmonary infiltrates on chest x-ray R91.8    Hypotension I95.9    YURI (acute kidney injury) ([facility] N17.9     Assessment:  (Medical Decision Making)     Hospital Problems  Date Reviewed: 9/22/2020          Codes Class Noted POA    YURI (acute kidney injury) ([facility] ICD-10-CM: N17.9  ICD-9-CM: 584.9  10/5/2020 Unknown     cr trending down        Bilateral pulmonary infiltrates on chest x-ray ICD-10-CM: R91.8  ICD-9-CM: 793.19  10/2/2020 Unknown    Improved-increased procal but cultures negative      Pulmonary edema ICD-10-CM: J81.1  ICD-9-CM: 060  10/1/2020 Unknown        Anxiety ICD-10-CM: F41.9  ICD-9-CM: 300.00  10/1/2020 Unknown        AMOR on CPAP ICD-10-CM: G47.33, Z99.89  ICD-9-CM: 327.23, V46.8  9/30/2020 Unknown        Paroxysmal A-fib (HCC) ICD-10-CM: I48.0  ICD-9-CM: 427.31  9/29/2020 Unknown        Atherosclerosis of native coronary artery of native heart with unstable angina pectoris (HCC) ICD-10-CM: I25.110  ICD-9-CM: 414.01, 411.1  9/29/2020 Unknown        * (Principal) S/P CABG x 4 ICD-10-CM: Z95.1  ICD-9-CM: V45.81  9/29/2020 Unknown        CAD (coronary artery disease) ICD-10-CM: I25.10  ICD-9-CM: 414.00  9/29/2020 Unknown        Paroxysmal atrial fibrillation ([facility] ICD-10-CM: I48.0  ICD-9-CM: 427.31  9/18/2020 Unknown            Plan:  (Medical Decision Making)   --converted to A fib early AM, now on amio drip and back in NSR  --was on cefepime per ID for HCAP, appropriately treated and d/c'd 10/9  - respiratory culture NGTD 10/6, blood cultures 10/7  --SSI  -Protonix  -replace K, give another 20 meq IV now and then 20 meq PO daily       More than 50% of the time documented was spent in face-to-face contact with the patient and in the care of the patient on the floor/unit where the patient is located. Jakob Limon NP     Lungs:  clear  Heart:  RRR with no Murmur/Rubs/Gallops    Additional Comments:  Back on RA, finished ABX, WBC coming down   will check back on Monday     I have spoken with and examined the patient. I agree with the above assessment and plan as documented.     Fernanda Cm MD

## 2020-10-10 NOTE — PROGRESS NOTES
Dr. Kurt Kaur rounding on patient reported potassium result of 2.6 V/O received for potassium 20meq IV now then obtain potassium level after infusion call nephrology for further potasium orders if needed. Also reported patient's elevated BP level overnight and low heart rate when sleeping V/O received to discontinue metoprolol and add norvasc 10mg po daily.

## 2020-10-10 NOTE — PROGRESS NOTES
Patient converted from NSR to afib with RVR with rate sustained up to 170. O2 placed via NC at 3L. Patient anxious, a&o, and facial flushing noted. Emotional support provided. Dr. Whit Lowery notified of rhythm change T/O received to start amio bolus and drip see MAR for details also ordered cards consult perfect serve message sent to Zach Blas NP she responded back that she would pace consult order along to day shift coverage.

## 2020-10-10 NOTE — PROGRESS NOTES
Report received from [facility], patient in Afib -180's Amio bolus given and Amio gtt started at 1mg/hr

## 2020-10-11 LAB
ANION GAP SERPL CALC-SCNC: 7 MMOL/L (ref 7–16)
BUN SERPL-MCNC: 29 MG/DL (ref 8–23)
CALCIUM SERPL-MCNC: 8.3 MG/DL (ref 8.3–10.4)
CHLORIDE SERPL-SCNC: 108 MMOL/L (ref 98–107)
CO2 SERPL-SCNC: 26 MMOL/L (ref 21–32)
CREAT SERPL-MCNC: 1.45 MG/DL (ref 0.6–1)
GLUCOSE BLD STRIP.AUTO-MCNC: 165 MG/DL (ref 65–100)
GLUCOSE BLD STRIP.AUTO-MCNC: 203 MG/DL (ref 65–100)
GLUCOSE BLD STRIP.AUTO-MCNC: 268 MG/DL (ref 65–100)
GLUCOSE SERPL-MCNC: 194 MG/DL (ref 65–100)
MAGNESIUM SERPL-MCNC: 2.3 MG/DL (ref 1.8–2.4)
POTASSIUM SERPL-SCNC: 3.2 MMOL/L (ref 3.5–5.1)
SODIUM SERPL-SCNC: 141 MMOL/L (ref 136–145)

## 2020-10-11 PROCEDURE — 82962 GLUCOSE BLOOD TEST: CPT

## 2020-10-11 PROCEDURE — 65660000004 HC RM CVT STEPDOWN

## 2020-10-11 PROCEDURE — 74011250637 HC RX REV CODE- 250/637: Performed by: PHYSICIAN ASSISTANT

## 2020-10-11 PROCEDURE — 74011250637 HC RX REV CODE- 250/637: Performed by: INTERNAL MEDICINE

## 2020-10-11 PROCEDURE — 99232 SBSQ HOSP IP/OBS MODERATE 35: CPT | Performed by: INTERNAL MEDICINE

## 2020-10-11 PROCEDURE — 82088 ASSAY OF ALDOSTERONE: CPT

## 2020-10-11 PROCEDURE — 74011250637 HC RX REV CODE- 250/637: Performed by: THORACIC SURGERY (CARDIOTHORACIC VASCULAR SURGERY)

## 2020-10-11 PROCEDURE — 80048 BASIC METABOLIC PNL TOTAL CA: CPT

## 2020-10-11 PROCEDURE — 74011636637 HC RX REV CODE- 636/637: Performed by: PHYSICIAN ASSISTANT

## 2020-10-11 PROCEDURE — 83735 ASSAY OF MAGNESIUM: CPT

## 2020-10-11 PROCEDURE — 74011250636 HC RX REV CODE- 250/636: Performed by: PHYSICIAN ASSISTANT

## 2020-10-11 PROCEDURE — 77030012890

## 2020-10-11 PROCEDURE — 2709999900 HC NON-CHARGEABLE SUPPLY

## 2020-10-11 PROCEDURE — 97530 THERAPEUTIC ACTIVITIES: CPT

## 2020-10-11 RX ORDER — SPIRONOLACTONE 25 MG/1
25 TABLET ORAL DAILY
Status: DISCONTINUED | OUTPATIENT
Start: 2020-10-11 | End: 2020-10-12 | Stop reason: HOSPADM

## 2020-10-11 RX ORDER — SPIRONOLACTONE 25 MG/1
25 TABLET ORAL
Status: COMPLETED | OUTPATIENT
Start: 2020-10-11 | End: 2020-10-11

## 2020-10-11 RX ORDER — POTASSIUM CHLORIDE 20 MEQ/1
40 TABLET, EXTENDED RELEASE ORAL 2 TIMES DAILY
Status: COMPLETED | OUTPATIENT
Start: 2020-10-11 | End: 2020-10-11

## 2020-10-11 RX ADMIN — Medication 10 ML: at 15:03

## 2020-10-11 RX ADMIN — HYDROCORTISONE: 25 CREAM TOPICAL at 11:32

## 2020-10-11 RX ADMIN — Medication 30 ML: at 05:59

## 2020-10-11 RX ADMIN — INSULIN LISPRO 4 UNITS: 100 INJECTION, SOLUTION INTRAVENOUS; SUBCUTANEOUS at 21:27

## 2020-10-11 RX ADMIN — RDII 250 MG CAPSULE 500 MG: at 17:17

## 2020-10-11 RX ADMIN — INSULIN LISPRO 6 UNITS: 100 INJECTION, SOLUTION INTRAVENOUS; SUBCUTANEOUS at 11:29

## 2020-10-11 RX ADMIN — RDII 250 MG CAPSULE 500 MG: at 08:56

## 2020-10-11 RX ADMIN — PANTOPRAZOLE SODIUM 40 MG: 40 TABLET, DELAYED RELEASE ORAL at 07:50

## 2020-10-11 RX ADMIN — Medication 30 ML: at 15:03

## 2020-10-11 RX ADMIN — SPIRONOLACTONE 12.5 MG: 25 TABLET ORAL at 10:10

## 2020-10-11 RX ADMIN — Medication 1 AMPULE: at 08:58

## 2020-10-11 RX ADMIN — AMIODARONE HYDROCHLORIDE 200 MG: 200 TABLET ORAL at 08:56

## 2020-10-11 RX ADMIN — ROSUVASTATIN CALCIUM 40 MG: 20 TABLET, COATED ORAL at 21:19

## 2020-10-11 RX ADMIN — INSULIN LISPRO 2 UNITS: 100 INJECTION, SOLUTION INTRAVENOUS; SUBCUTANEOUS at 17:17

## 2020-10-11 RX ADMIN — Medication 1 AMPULE: at 21:19

## 2020-10-11 RX ADMIN — POTASSIUM CHLORIDE 40 MEQ: 20 TABLET, EXTENDED RELEASE ORAL at 18:10

## 2020-10-11 RX ADMIN — Medication 10 ML: at 05:59

## 2020-10-11 RX ADMIN — INSULIN GLARGINE 28 UNITS: 100 INJECTION, SOLUTION SUBCUTANEOUS at 21:27

## 2020-10-11 RX ADMIN — LISINOPRIL 40 MG: 20 TABLET ORAL at 08:56

## 2020-10-11 RX ADMIN — POTASSIUM CHLORIDE 40 MEQ: 20 TABLET, EXTENDED RELEASE ORAL at 17:17

## 2020-10-11 RX ADMIN — ASPIRIN 81 MG: 81 TABLET, CHEWABLE ORAL at 08:56

## 2020-10-11 RX ADMIN — HYDRALAZINE HYDROCHLORIDE 20 MG: 20 INJECTION, SOLUTION INTRAMUSCULAR; INTRAVENOUS at 06:28

## 2020-10-11 RX ADMIN — AMIODARONE HYDROCHLORIDE 200 MG: 200 TABLET ORAL at 21:19

## 2020-10-11 RX ADMIN — CARVEDILOL 6.25 MG: 6.25 TABLET, FILM COATED ORAL at 07:51

## 2020-10-11 RX ADMIN — CARVEDILOL 6.25 MG: 6.25 TABLET, FILM COATED ORAL at 17:17

## 2020-10-11 RX ADMIN — Medication 30 ML: at 21:32

## 2020-10-11 RX ADMIN — Medication 10 ML: at 21:33

## 2020-10-11 RX ADMIN — AMLODIPINE BESYLATE 10 MG: 10 TABLET ORAL at 08:56

## 2020-10-11 RX ADMIN — HYDRALAZINE HYDROCHLORIDE 20 MG: 20 INJECTION, SOLUTION INTRAMUSCULAR; INTRAVENOUS at 18:14

## 2020-10-11 RX ADMIN — POTASSIUM CHLORIDE 40 MEQ: 20 TABLET, EXTENDED RELEASE ORAL at 08:57

## 2020-10-11 RX ADMIN — SPIRONOLACTONE 25 MG: 25 TABLET ORAL at 06:19

## 2020-10-11 NOTE — PROGRESS NOTES
[facility] CARDIOLOGY PROGRESS NOTE           10/11/2020 9:20 AM    Admit Date: 9/29/2020    Admit Diagnosis: Atherosclerosis of native coronary artery with unstable angina pectoris, unspecified whether native or transplanted heart ([facility] Angeline Karlenel; Paroxysmal A-fib ([address] [I48.0]; Paroxysmal A-fib ([address] [I48.0]; Atherosclerosis of native coronary artery of native heart with unstable angina pectoris ([address] [I25.110];CAD (coronary artery disease) [I25.10]; Paroxysmal atrial fibrillation (HCC) [I48.0]    Assessment:   Principal Problem:    S/P CABG x 4 (9/29/2020)    Active Problems:    Paroxysmal atrial fibrillation (HCC) (9/18/2020)      Paroxysmal A-fib (HCC) (9/29/2020)      Atherosclerosis of native coronary artery of native heart with unstable angina pectoris ([address] (9/29/2020)      CAD (coronary artery disease) (9/29/2020)      AMOR on CPAP (9/30/2020)      Pulmonary edema (10/1/2020)      Anxiety (10/1/2020)      YURI (acute kidney injury) ([facility] (10/5/2020)        Plan:   Atrial fibrillation, paroxysmal, post surgical  CAD s/p CABG  Essential HTN  CKD  GERD  Recovering PNA  Pulmonary edema  AMOR  DMII  Bradycardia     -AF - stable on amiodarone and coreg. -HTN - increased ACEi dose, K low, will check cinthia/renin level and start K-sparing diuretic. On [address] [place] [address] CCB as well. -CKD - improving daily. Carefully following labs. -CAD - OMT. -Stroke ppx - would add [address] when clinically able unless contraindication despite MADI clip. Likely will only need for 3-4 months if truly post surgical AF.   -DMII - continue with insulin. -AMOR - on CPAP. Thank you for allowing me to participate in the electrophysiologic care of this most pleasant patient. Please feel free to contact me if there are any questions or concerns. Neealm Goldberg. Joshua Pearce MD, MS  Clinical Cardiac Electrophysiology  [facility]    Subjective:   No complaints this AM, no chest pain or shortness of breath. Stable, in NSR. Interval History: (History of pertinent interval events obtained from nursing staff)    ROS:  GEN:  No fever or chills  Cardiovascular:  As noted above  Pulmonary:  As noted above  Neuro:  No new focal motor or sensory loss      Objective:     Vitals:    10/10/20 2233 10/11/20 0340 10/11/20 0619 10/11/20 0730   BP: (!) 173/78 (!) 166/72 (!) 199/84 139/63   Pulse: 64 65 68 77   Resp: 18 16  16   Temp: 98.6 °F (37 °C) 98.6 °F (37 °C)  97.7 °F (36.5 °C)   SpO2: 97% 97%  98%   Weight:  183 lb 11.2 oz (83.3 kg)     Height:           Physical Exam:  General-Well Developed, Well Nourished, No Acute Distress, Alert & Oriented x 3, appropriate mood. Neck- supple, no JVD  CV- regular rate and rhythm no MRG, central SS C/D/I. Lung- clear bilaterally  Abd- soft, nontender, nondistended  Ext- no edema bilaterally.   Skin- warm and dry    Current Facility-Administered Medications   Medication Dose Route Frequency    potassium chloride (K-DUR, KLOR-CON) SR tablet 40 mEq  40 mEq Oral BID    spironolactone (ALDACTONE) tablet 25 mg  25 mg Oral DAILY    amLODIPine (NORVASC) tablet 10 mg  10 mg Oral DAILY    potassium chloride (K-DUR, KLOR-CON) SR tablet 40 mEq  40 mEq Oral BID    lisinopriL (PRINIVIL, ZESTRIL) tablet 40 mg  40 mg Oral DAILY    carvediloL (COREG) tablet 6.25 mg  6.25 mg Oral BID WITH MEALS    diphenhydrAMINE (BENADRYL) capsule 25 mg  25 mg Oral QHS PRN    pantoprazole (PROTONIX) tablet 40 mg  40 mg Oral ACB    Saccharomyces boulardii (FLORASTOR) capsule 500 mg  500 mg Oral BID    insulin glargine (LANTUS) injection 28 Units  28 Units SubCUTAneous QHS    magnesium hydroxide (MILK OF MAGNESIA) 400 mg/5 mL oral suspension 30 mL  30 mL Oral DAILY PRN    alum-mag hydroxide-simeth (MYLANTA) oral suspension 30 mL  30 mL Oral Q4H PRN    magnesium oxide (MAG-OX) tablet 400 mg  400 mg Oral TID PRN    magnesium oxide (MAG-OX) tablet 400 mg  400 mg Oral QID PRN    potassium chloride (K-DUR, KLOR-CON) SR tablet 20 mEq  20 mEq Oral BID PRN    potassium chloride (K-DUR, KLOR-CON) SR tablet 40 mEq  40 mEq Oral BID PRN    insulin lispro (HUMALOG) injection 0-10 Units  0-10 Units SubCUTAneous AC&HS    hydrocortisone (ANUSOL-HC) 2.5 % rectal cream   PeriANAL QID PRN    hydrALAZINE (APRESOLINE) 20 mg/mL injection 20 mg  20 mg IntraVENous Q4H PRN    sodium chloride (NS) flush 30 mL  30 mL InterCATHeter Q8H    sodium chloride (NS) flush 30 mL  30 mL InterCATHeter PRN    traMADoL (ULTRAM) tablet 100 mg  100 mg Oral Q6H PRN    oxyCODONE-acetaminophen (PERCOCET) 5-325 mg per tablet 1 Tab  1 Tab Oral Q4H PRN    alcohol 62% (NOZIN) nasal  1 Ampule  1 Ampule Topical Q12H    rosuvastatin (CRESTOR) tablet 40 mg  40 mg Oral QHS    sodium chloride (NS) flush 5-40 mL  5-40 mL IntraVENous Q8H    sodium chloride (NS) flush 5-40 mL  5-40 mL IntraVENous PRN    amiodarone (CORDARONE) tablet 200 mg  200 mg Oral Q12H    ondansetron (ZOFRAN) injection 4 mg  4 mg IntraVENous Q4H PRN    aspirin chewable tablet 81 mg  81 mg Oral DAILY    acetaminophen (TYLENOL) tablet 650 mg  650 mg Oral Q4H PRN       Data Review:   Recent Results (from the past 24 hour(s))   GLUCOSE, POC    Collection Time: 10/10/20 11:10 AM   Result Value Ref Range    Glucose (POC) 246 (H) 65 - 100 mg/dL   POTASSIUM    Collection Time: 10/10/20  3:22 PM   Result Value Ref Range    Potassium 3.3 (L) 3.5 - 5.1 mmol/L   GLUCOSE, POC    Collection Time: 10/10/20  3:53 PM   Result Value Ref Range    Glucose (POC) 368 (H) 65 - 100 mg/dL   GLUCOSE, POC    Collection Time: 10/10/20  8:39 PM   Result Value Ref Range    Glucose (POC) 133 (H) 65 - 565 mg/dL   METABOLIC PANEL, BASIC    Collection Time: 10/11/20  3:36 AM   Result Value Ref Range    Sodium 141 136 - 145 mmol/L    Potassium 3.2 (L) 3.5 - 5.1 mmol/L    Chloride 108 (H) 98 - 107 mmol/L    CO2 26 21 - 32 mmol/L    Anion gap 7 7 - 16 mmol/L    Glucose 194 (H) 65 - 100 mg/dL    BUN 29 (H) 8 - 23 MG/DL Creatinine 1.45 (H) 0.6 - 1.0 MG/DL    GFR est AA 47 (L) >60 ml/min/1.73m2    GFR est non-AA 39 (L) >60 ml/min/1.73m2    Calcium 8.3 8.3 - 10.4 MG/DL   MAGNESIUM    Collection Time: 10/11/20  3:36 AM   Result Value Ref Range    Magnesium 2.3 1.8 - 2.4 mg/dL       EKG:  (EKG has been independently visualized by me with interpretation below): NSR, normal axis, no ischemia.

## 2020-10-11 NOTE — PROGRESS NOTES
[facility]    Follow-Up on: YURI on CKD    HPI: Pt looks great! No complaints.     ROS:  UTO    Current Facility-Administered Medications   Medication Dose Route Frequency    potassium chloride (K-DUR, KLOR-CON) SR tablet 40 mEq  40 mEq Oral BID    spironolactone (ALDACTONE) tablet 25 mg  25 mg Oral DAILY    amLODIPine (NORVASC) tablet 10 mg  10 mg Oral DAILY    potassium chloride (K-DUR, KLOR-CON) SR tablet 40 mEq  40 mEq Oral BID    lisinopriL (PRINIVIL, ZESTRIL) tablet 40 mg  40 mg Oral DAILY    carvediloL (COREG) tablet 6.25 mg  6.25 mg Oral BID WITH MEALS    diphenhydrAMINE (BENADRYL) capsule 25 mg  25 mg Oral QHS PRN    pantoprazole (PROTONIX) tablet 40 mg  40 mg Oral ACB    Saccharomyces boulardii (FLORASTOR) capsule 500 mg  500 mg Oral BID    insulin glargine (LANTUS) injection 28 Units  28 Units SubCUTAneous QHS    magnesium hydroxide (MILK OF MAGNESIA) 400 mg/5 mL oral suspension 30 mL  30 mL Oral DAILY PRN    alum-mag hydroxide-simeth (MYLANTA) oral suspension 30 mL  30 mL Oral Q4H PRN    magnesium oxide (MAG-OX) tablet 400 mg  400 mg Oral TID PRN    magnesium oxide (MAG-OX) tablet 400 mg  400 mg Oral QID PRN    potassium chloride (K-DUR, KLOR-CON) SR tablet 20 mEq  20 mEq Oral BID PRN    potassium chloride (K-DUR, KLOR-CON) SR tablet 40 mEq  40 mEq Oral BID PRN    insulin lispro (HUMALOG) injection 0-10 Units  0-10 Units SubCUTAneous AC&HS    hydrocortisone (ANUSOL-HC) 2.5 % rectal cream   PeriANAL QID PRN    hydrALAZINE (APRESOLINE) 20 mg/mL injection 20 mg  20 mg IntraVENous Q4H PRN    sodium chloride (NS) flush 30 mL  30 mL InterCATHeter Q8H    sodium chloride (NS) flush 30 mL  30 mL InterCATHeter PRN    traMADoL (ULTRAM) tablet 100 mg  100 mg Oral Q6H PRN    oxyCODONE-acetaminophen (PERCOCET) 5-325 mg per tablet 1 Tab  1 Tab Oral Q4H PRN    alcohol 62% (NOZIN) nasal  1 Ampule  1 Ampule Topical Q12H    rosuvastatin (CRESTOR) tablet 40 mg  40 mg Oral QHS    sodium chloride (NS) flush 5-40 mL  5-40 mL IntraVENous Q8H    sodium chloride (NS) flush 5-40 mL  5-40 mL IntraVENous PRN    amiodarone (CORDARONE) tablet 200 mg  200 mg Oral Q12H    ondansetron (ZOFRAN) injection 4 mg  4 mg IntraVENous Q4H PRN    aspirin chewable tablet 81 mg  81 mg Oral DAILY    acetaminophen (TYLENOL) tablet 650 mg  650 mg Oral Q4H PRN       Exam:  Vitals:    10/10/20 2233 10/11/20 0340 10/11/20 0619 10/11/20 0730   BP: (!) 173/78 (!) 166/72 (!) 199/84 139/63   Pulse: 64 65 68 77   Resp: 18 16  16   Temp: 98.6 °F (37 °C) 98.6 °F (37 °C)  97.7 °F (36.5 °C)   SpO2: 97% 97%  98%   Weight:  83.3 kg (183 lb 11.2 oz)     Height:             Intake/Output Summary (Last 24 hours) at 10/11/2020 1147  Last data filed at 10/11/2020 0427  Gross per 24 hour   Intake 640 ml   Output 1400 ml   Net -760 ml     PE:  GEN - in no distress  CV - regular, no murmur, no rub  Lung - clear bilaterally  Abd - soft, nontender  Ext - no edema    Labs  Recent Labs     10/10/20  0316 10/09/20  0344   WBC 15.9* 27.6*   HGB 9.5* 9.9*   HCT 28.7* 29.9*    452*     Recent Labs     10/11/20  0336 10/10/20  1522 10/10/20  0526 10/10/20  0316  10/09/20  0857 10/09/20  0344     --   --  140  --  140 141   K 3.2* 3.3* 2.8* 2.6*   < > 3.5 2.5*   *  --   --  104  --  103 103   CO2 26  --   --  29  --  30 31   BUN 29*  --   --  35*  --  38* 41*   CREA 1.45*  --   --  1.54*  --  1.66* 1.75*   *  --   --  143*  --  175* 164*   CA 8.3  --   --  8.0*  --  8.6 8.5   MG 2.3  --   --  2.5*  --   --  2.6*   PHOS  --   --   --   --   --   --  3.1    < > = values in this interval not displayed. No results for input(s): PH, PCO2, PO2, PCO2 in the last 72 hours.     Problem List:  Patient Active Problem List    Diagnosis Date Noted    YURI (acute kidney injury) ([facility] 10/05/2020    Pulmonary edema 10/01/2020    Anxiety 10/01/2020    AMOR on CPAP 09/30/2020    Paroxysmal A-fib ([facility] 09/29/2020    Atherosclerosis of native coronary artery of native heart with unstable angina pectoris ([facility] 09/29/2020    S/P CABG x 4 09/29/2020    Suspected sleep apnea 09/29/2020    CAD (coronary artery disease) 09/29/2020    Paroxysmal atrial fibrillation ([facility] 09/18/2020    Essential hypertension 09/18/2020    Mixed hyperlipidemia 09/18/2020    Uterine cancer ([place] [address] 09/18/2020    Chest pain 09/18/2020    Atrial fibrillation ([place] [address] 09/18/2020       Issues Addressed By Nephrology:    Plan:  1. S/P CABG X4 V  2. Resp Failure  3. Pulmonary edema  4. YURI on CKD stable   5. HTN may restart home BP meds including Lisinopril  6. Hypokalemia place on oral K bid add aldactone.; will eventually need Kdur 20 meq every day, aldactone 25 mg every day and lisinopril 20 mg every day.  And close follow up of potassium  Continue supportive care

## 2020-10-11 NOTE — PROGRESS NOTES
Problem: Mobility Impaired (Adult and Pediatric)  Goal: *Acute Goals and Plan of Care (Insert Text)  Outcome: Progressing Towards Goal  Note: LTG:  (1.)Ms. Daly will move from supine to sit and sit to supine , scoot up and down, and roll side to side with INDEPENDENT within 7 treatment day(s). (2.)Ms. Daly will transfer from bed to chair and chair to bed with INDEPENDENT using the least restrictive device within 7 treatment day(s). (3.)Ms. Daly will ambulate with INDEPENDENT for 500+ feet with the least restrictive device within 7 treatment day(s) while maintaining normal vital signs. (4.)Ms. Daly will perform 3-4 steps with HR and SBA within 7 treatment days for safety ascending and descending stairs. (5.)Ms. Daly will be independent in HEP for B LE strength and mobility within 7 treatment days.    ___________________________________________________________________________________________      PHYSICAL THERAPY: Daily Note and AM 10/11/2020  INPATIENT: PT Visit Days : 4  Payor: [employer] / Plan: [facility] / Product Type: PPO /       NAME/AGE/GENDER: Emiliana Easton is a 56 y.o. female   PRIMARY DIAGNOSIS: Atherosclerosis of native coronary artery with unstable angina pectoris, unspecified whether native or transplanted heart (HCC) [I25.110]  Paroxysmal A-fib (HCC) [I48.0]  Paroxysmal A-fib (HCC) [I48.0]  Atherosclerosis of native coronary artery of native heart with unstable angina pectoris ([facility] [I25.110]  CAD (coronary artery disease) [I25.10]  Paroxysmal atrial fibrillation (HCC) [I48.0] S/P CABG x 4 S/P CABG x 4  Procedure(s) (LRB):  BRONCHOSCOPY ROOM 104 (N/A)  BRONCHIAL ALVEOLAR LAVAGE (N/A)  9 Days Post-Op  ICD-10: Treatment Diagnosis:    · Generalized Muscle Weakness (M62.81)  · Difficulty in walking, Not elsewhere classified (R26.2)   Precaution/Allergies:  Influenza virus vaccines      ASSESSMENT:     Ms. Flores Elder presents with decreased bed mobility, transfers, ambulation, balance, activity tolerance, strength and overall general functional mobility s/p hospital admission with above, now CABG x 4 on 20. Pt with complications post surgery, extubated  10/8/20  No O2 . Prior to surgery, pt works as hospice RN, independent at baseline. Pt lives alone per chart, pt states \"in-laws are in and out\", spouse . Patient is in recliner and ready to walk. She stood with modified independence (increased time) and walked the unit without the walker very slowly but with SBA. Steady progress. Agree with home with home health. This section established at most recent assessment   PROBLEM LIST (Impairments causing functional limitations):  1. Decreased Strength  2. Decreased ADL/Functional Activities  3. Decreased Transfer Abilities  4. Decreased Ambulation Ability/Technique  5. Decreased Balance  6. Decreased Activity Tolerance  7. Decreased [place] with Home Exercise Program  8. Decreased Cognition   INTERVENTIONS PLANNED: (Benefits and precautions of physical therapy have been discussed with the patient.)  1. Balance Exercise  2. Bed Mobility  3. Family Education  4. Gait Training  5. Home Exercise Program (HEP)  6. Therapeutic Activites  7. Therapeutic Exercise/Strengthening  8. Transfer Training     TREATMENT PLAN: Frequency/Duration: twice daily for duration of hospital stay  Rehabilitation Potential For Stated Goals: Good     REHAB RECOMMENDATIONS (at time of discharge pending progress):    Placement: It is my opinion, based on this patient's performance to date, that Ms. Daly may benefit from [address] after discharge due to the functional deficits listed above that are likely to improve with skilled rehabilitation because he/she has multiple medical issues that affect his/her functional mobility in the community.   Equipment:    None at this time              HISTORY:   History of Present Injury/Illness (Reason for Referral):  S/p CABG x 4, see H & P  Past Medical History/Comorbidities:   Ms. Shannon Leo  has a past medical history of Anxiety, Borderline diabetes, Endometrial cancer ([facility] [address] [facility] Essential hypertension (9/18/2020), Heart failure ([facility] [address] History of anemia, Mixed hyperlipidemia (9/18/2020), Paroxysmal atrial fibrillation ([facility] (9/18/2020), and Sleep apnea. She also has no past medical history of Difficult intubation, Malignant hyperthermia due to anesthesia, Nausea & vomiting, or Pseudocholinesterase deficiency. Ms. Shannon Leo  has a past surgical history that includes hx hysterectomy; hx heart catheterization (09/18/2020); hx cyst removal; hx cyst removal; and hx orthopaedic. Social History/Living Environment:   Home Environment: Private residence  # Steps to Enter: 0  One/Two Story Residence: Two story, live on 1st floor  Living Alone: Yes  Support Systems: Child(licha), Family member(s)  Patient Expects to be Discharged to[de-identified] Private residence  Current DME Used/Available at Home: CPAP  Prior Level of Function/Work/Activity:  Lives alone, independent at baseline  Personal Factors:          Sex:  female        Age:  56 y.o. Overall Behavior:  agreeable, confused   Number of Personal Factors/Comorbidities that affect the Plan of Care:  Cancer, DM  1-2: MODERATE COMPLEXITY   EXAMINATION:   Most Recent Physical Functioning:   Gross Assessment:                  Posture:     Balance:    Bed Mobility:     Wheelchair Mobility:     Transfers:  Sit to Stand: Modified independent; Additional time  Stand to Sit: Modified independent; Additional time  Gait:     Speed/Lial: Pace decreased (<100 feet/min)  Gait Abnormalities: Decreased step clearance  Distance (ft): 250 Feet (ft)  Assistive Device: Walker, rolling  Ambulation - Level of Assistance: Stand-by assistance      Body Structures Involved:  1. Heart  2. Lungs  3. Muscles Body Functions Affected:  1. Cardio  2. Respiratory  3.  Movement Related Activities and Participation Affected:  1. General Tasks and Demands  2. Mobility  3. Self Care   Number of elements that affect the Plan of Care: 4+: HIGH COMPLEXITY   CLINICAL PRESENTATION:   Presentation: Evolving clinical presentation with changing clinical characteristics: MODERATE COMPLEXITY   CLINICAL DECISION MAKIN [facility] Inpatient Short Form  How much difficulty does the patient currently have. .. Unable A Lot A Little None   1. Turning over in bed (including adjusting bedclothes, sheets and blankets)? [] 1   [x] 2   [] 3   [] 4   2. Sitting down on and standing up from a chair with arms ( e.g., wheelchair, bedside commode, etc.)   [] 1   [x] 2   [] 3   [] 4   3. Moving from lying on back to sitting on the side of the bed? [] 1   [x] 2   [] 3   [] 4   How much help from another person does the patient currently need. .. Total A Lot A Little None   4. Moving to and from a bed to a chair (including a wheelchair)? [] 1   [] 2   [x] 3   [] 4   5. Need to walk in hospital room? [] 1   [] 2   [x] 3   [] 4   6. Climbing 3-5 steps with a railing? [] 1   [x] 2   [] 3   [] 4   © , Trustees of [facility], under license to [employer]. All rights reserved      Score:  Initial: 14 Most Recent: X (Date: -- )    Interpretation of Tool:  Represents activities that are increasingly more difficult (i.e. Bed mobility, Transfers, Gait). Medical Necessity:     · Patient is expected to demonstrate progress in   · strength, range of motion, balance, and coordination  ·  to   · decrease assistance required with overall functional mobility, transfers, ambulation  · .  · Patient demonstrates   · good  ·  rehab potential due to higher previous functional level.   Reason for Services/Other Comments:  · Patient   · continues to require present interventions due to patient's inability to perform bed mobility, transfers, ambulation safely and effectively at prior level of function of independent. · .   Use of outcome tool(s) and clinical judgement create a POC that gives a: Clear prediction of patient's progress: LOW COMPLEXITY            TREATMENT:   (In addition to Assessment/Re-Assessment sessions the following treatments were rendered)   Pre-treatment Symptoms/Complaints:  \"I'm ready\"  Pain: Initial:      Post Session:  3/10     Therapeutic Activity: (    25 min): Therapeutic activities including bed transfers, Toilet transfers and Ambulation on level ground to improve mobility, strength, balance, and coordination. Required CGA to promote static and dynamic balance in standing . Sami Brasher Date:  10/09/20 Date:   Date:     ACTIVITY/EXERCISE AM PM AM PM AM PM   LAQ 10        Shoulder shrugs 10        Gluteal sets 10        marching 10        Ankle pumps 10        abduction 10                 B = bilateral; AA = active assistive; A = active; P = passive      Braces/Orthotics/Lines/Etc:   · IV  Treatment/Session Assessment:    · Response to Treatment:  tolerated mobility well   · Interdisciplinary Collaboration:   o Physical Therapy Assistant  o Registered Nurse  · After treatment position/precautions:   o Supine in bed  o Bed/Chair-wheels locked  o Bed in low position  o Call light within reach  o RN notified  o Family at bedside   · Compliance with Program/Exercises: Compliant all of the time  · Recommendations/Intent for next treatment session: \"Next visit will focus on advancements to more challenging activities\".   Total Treatment Duration:  PT Patient Time In/Time Out  Time In: 1000  Time Out: 1500 [facility]

## 2020-10-11 NOTE — ROUTINE PROCESS
Bedside and Verbal shift change report to be given to Catie Fong RN (oncoming nurse) by self Priti Agent nurse). Report included the following information SBAR, Kardex, Procedure Summary, Intake/Output and MAR.

## 2020-10-11 NOTE — PROGRESS NOTES
CM received consult to assist with discharge planning. Per PT Eval this day, the patient has been recommended for [facility] services. CM met with patient to discuss discharge needs. CM discussed [facility] services with patient, with patient's daughter Stephanie Sarkar at bedside. Patient is agreeable to [facility] services, however, the patient will reside in [facility], upon discharge. Address: [address]. CM will identify [facility] agencies, that can assist patient. CM will also inform primary CM, to follow up regarding this need. Please consult or notify CM if any needs shall arise. CM continues to monitor.

## 2020-10-11 NOTE — PROGRESS NOTES
Rec'd call from monitor room patient in Afib. Rates . At 6084, Patient back in NSR. Morning meds given, including Amiodarone 200mg, Norvasc 10mg, Coreg. Patient denies any symptoms. Resting in recliner. Call light within reach.

## 2020-10-11 NOTE — PROGRESS NOTES
Today's Date: 10/11/2020  Date of Admission: 9/29/2020    Chart Reviewed. Subjective / Interval Events:     Patient feels better today. Bradycardia yesterday, then Afib with RVR (cardiology managing). Hypokalemia improving. Otherwise no issues. Medications Reviewed. Objective:     Vitals:    10/10/20 2118 10/10/20 2220 10/10/20 2233 10/11/20 0340   BP: (!) 182/81 (!) 173/78 (!) 173/78 (!) 166/72   Pulse: 60 64 64 65   Resp:   18 16   Temp:   98.6 °F (37 °C) 98.6 °F (37 °C)   SpO2:   97% 97%   Weight:    83.3 kg (183 lb 11.2 oz)   Height:           Intake and Output  Current Shift: No intake/output data recorded. Last 3 Shifts: 10/09 0701 - 10/10 1900  In: 1570 [P.O.:1570]  Out: 1500 [Urine:1300]    Physical Exam:  General: Well Developed, Obese, No Acute Distress, Alert & Oriented x 3, Appropriate mood  Neck: supple, no JVD  Heart: S1S2 with RRR without murmurs or gallops  Lungs: Clear throughout auscultation bilaterally  Abd: soft, nontender, nondistended, with good bowel sounds  Ext: no edema bilaterally  Sternal incision: clean, dry, and intact  Skin: warm and dry    LABS  Data Review:   Recent Labs     10/10/20  1522 10/10/20  0526 10/10/20  0316  10/09/20  0857 10/09/20  0344   NA  --   --  140  --  140 141   K 3.3* 2.8* 2.6*   < > 3.5 2.5*   MG  --   --  2.5*  --   --  2.6*   BUN  --   --  35*  --  38* 41*   CREA  --   --  1.54*  --  1.66* 1.75*   GLU  --   --  143*  --  175* 164*   WBC  --   --  15.9*  --   --  27.6*   HGB  --   --  9.5*  --   --  9.9*   HCT  --   --  28.7*  --   --  29.9*   PLT  --   --  397  --   --  452*    < > = values in this interval not displayed. Estimated Creatinine Clearance: 38.7 mL/min (A) (based on SCr of 1.54 mg/dL (H)).       Assessment/Plan:     Principal Problem:    S/P CABG x 4 (9/29/2020)  Was re-intubated 10/2, now extubated and on room air, afebrile, cultures neg to date, no DVT in upper or lower extremities, on IV antibiotics, ID following    Active Problems:    Paroxysmal [facility] fibrillation ([facility] (9/18/2020). No amio, as per cardiology. On carvedilol.         Paroxysmal A-fib ([facility] [address] (9/29/2020)    S/p MAZE, in sinus currently        Atherosclerosis of native coronary artery of native heart with unstable angina pectoris ([facility] (9/29/2020)          CAD (coronary artery disease) (9/29/2020)  S/p CABG        AMOR on CPAP (9/30/2020)       Pulmonary edema (10/1/2020)  Improved       Anxiety (10/1/2020)          Acute respiratory failure with hypoxia (HCC) (10/2/2020)  Extubated, resolved        Bilateral pulmonary infiltrates on chest x-ray (10/2/2020)  Chest xray improved, stopped lasix drip given worsening renal function       Hypotension (10/3/2020)  Resolved         YURI (acute kidney injury) ([facility] (10/5/2020)  Renal function much improved, nephrology following        DM  Was not on any meds at home, Hgb A1C 8.2 pre-op, on Lantus, continue to titrate as needed       Hypertension  BP remains elevated today, ACE-I resumed by nephrology. Added 10 mg Norvasc. Cardiology added carvedilol yesterday. Further meds per cardiology.     Dimitri Adame MD

## 2020-10-11 NOTE — ROUTINE PROCESS
Bedside and Verbal shift change report to be given to self (oncoming nurse) by Kervin Ospina RN (offgoing nurse). Report included the following information SBAR, Kardex, Procedure Summary, Intake/Output and MAR.

## 2020-10-12 VITALS
WEIGHT: 184.1 LBS | SYSTOLIC BLOOD PRESSURE: 147 MMHG | HEIGHT: 63 IN | DIASTOLIC BLOOD PRESSURE: 67 MMHG | BODY MASS INDEX: 32.62 KG/M2 | RESPIRATION RATE: 20 BRPM | OXYGEN SATURATION: 99 % | HEART RATE: 58 BPM | TEMPERATURE: 98.6 F

## 2020-10-12 PROBLEM — I25.110 ATHEROSCLEROSIS OF NATIVE CORONARY ARTERY OF NATIVE HEART WITH UNSTABLE ANGINA PECTORIS (HCC): Chronic | Status: ACTIVE | Noted: 2020-09-29

## 2020-10-12 PROBLEM — F41.9 ANXIETY: Chronic | Status: ACTIVE | Noted: 2020-10-01

## 2020-10-12 PROBLEM — Z99.89 OSA ON CPAP: Chronic | Status: ACTIVE | Noted: 2020-09-30

## 2020-10-12 PROBLEM — G47.33 OSA ON CPAP: Chronic | Status: ACTIVE | Noted: 2020-09-30

## 2020-10-12 PROBLEM — I25.10 CAD (CORONARY ARTERY DISEASE): Chronic | Status: ACTIVE | Noted: 2020-09-29

## 2020-10-12 LAB
ANION GAP SERPL CALC-SCNC: 6 MMOL/L (ref 7–16)
BACTERIA SPEC CULT: NORMAL
BACTERIA SPEC CULT: NORMAL
BUN SERPL-MCNC: 22 MG/DL (ref 8–23)
CALCIUM SERPL-MCNC: 8.5 MG/DL (ref 8.3–10.4)
CHLORIDE SERPL-SCNC: 109 MMOL/L (ref 98–107)
CO2 SERPL-SCNC: 23 MMOL/L (ref 21–32)
CREAT SERPL-MCNC: 1.31 MG/DL (ref 0.6–1)
GLUCOSE BLD STRIP.AUTO-MCNC: 184 MG/DL (ref 65–100)
GLUCOSE BLD STRIP.AUTO-MCNC: 184 MG/DL (ref 65–100)
GLUCOSE SERPL-MCNC: 184 MG/DL (ref 65–100)
MAGNESIUM SERPL-MCNC: 2.3 MG/DL (ref 1.8–2.4)
POTASSIUM SERPL-SCNC: 4.4 MMOL/L (ref 3.5–5.1)
SERVICE CMNT-IMP: NORMAL
SERVICE CMNT-IMP: NORMAL
SODIUM SERPL-SCNC: 138 MMOL/L (ref 136–145)

## 2020-10-12 PROCEDURE — 74011250637 HC RX REV CODE- 250/637: Performed by: NURSE PRACTITIONER

## 2020-10-12 PROCEDURE — 74011250637 HC RX REV CODE- 250/637: Performed by: PHYSICIAN ASSISTANT

## 2020-10-12 PROCEDURE — 36592 COLLECT BLOOD FROM PICC: CPT

## 2020-10-12 PROCEDURE — 74011636637 HC RX REV CODE- 636/637: Performed by: PHYSICIAN ASSISTANT

## 2020-10-12 PROCEDURE — 74011000250 HC RX REV CODE- 250: Performed by: INTERNAL MEDICINE

## 2020-10-12 PROCEDURE — 74011250637 HC RX REV CODE- 250/637: Performed by: THORACIC SURGERY (CARDIOTHORACIC VASCULAR SURGERY)

## 2020-10-12 PROCEDURE — 99232 SBSQ HOSP IP/OBS MODERATE 35: CPT | Performed by: INTERNAL MEDICINE

## 2020-10-12 PROCEDURE — 82962 GLUCOSE BLOOD TEST: CPT

## 2020-10-12 PROCEDURE — 97530 THERAPEUTIC ACTIVITIES: CPT

## 2020-10-12 PROCEDURE — 94760 N-INVAS EAR/PLS OXIMETRY 1: CPT

## 2020-10-12 PROCEDURE — 97110 THERAPEUTIC EXERCISES: CPT

## 2020-10-12 PROCEDURE — 74011250636 HC RX REV CODE- 250/636: Performed by: PHYSICIAN ASSISTANT

## 2020-10-12 PROCEDURE — 94640 AIRWAY INHALATION TREATMENT: CPT

## 2020-10-12 PROCEDURE — 74011250637 HC RX REV CODE- 250/637: Performed by: INTERNAL MEDICINE

## 2020-10-12 PROCEDURE — 83735 ASSAY OF MAGNESIUM: CPT

## 2020-10-12 PROCEDURE — 80048 BASIC METABOLIC PNL TOTAL CA: CPT

## 2020-10-12 PROCEDURE — 77030013140 HC MSK NEB VYRM -A

## 2020-10-12 PROCEDURE — 77030012890

## 2020-10-12 PROCEDURE — 2709999900 HC NON-CHARGEABLE SUPPLY

## 2020-10-12 RX ORDER — ACETAMINOPHEN 325 MG/1
650 TABLET ORAL
Status: SHIPPED | COMMUNITY
Start: 2020-10-12

## 2020-10-12 RX ORDER — SPIRONOLACTONE 25 MG/1
25 TABLET ORAL DAILY
Qty: 30 TAB | Refills: 3 | Status: SHIPPED | OUTPATIENT
Start: 2020-10-13

## 2020-10-12 RX ORDER — AMLODIPINE BESYLATE 10 MG/1
10 TABLET ORAL DAILY
Qty: 30 TAB | Refills: 3 | Status: SHIPPED | OUTPATIENT
Start: 2020-10-13

## 2020-10-12 RX ORDER — GUAIFENESIN 600 MG/1
1200 TABLET, EXTENDED RELEASE ORAL 2 TIMES DAILY
Qty: 56 TAB | Refills: 0 | Status: SHIPPED | OUTPATIENT
Start: 2020-10-12 | End: 2020-10-26

## 2020-10-12 RX ORDER — AMIODARONE HYDROCHLORIDE 200 MG/1
TABLET ORAL
Qty: 21 TAB | Refills: 0 | Status: SHIPPED | OUTPATIENT
Start: 2020-10-12 | End: 2020-10-26

## 2020-10-12 RX ORDER — CARVEDILOL 6.25 MG/1
6.25 TABLET ORAL 2 TIMES DAILY WITH MEALS
Qty: 60 TAB | Refills: 3 | Status: SHIPPED | OUTPATIENT
Start: 2020-10-12

## 2020-10-12 RX ORDER — CEPHALEXIN 500 MG/1
500 CAPSULE ORAL 3 TIMES DAILY
Qty: 21 CAP | Refills: 0 | Status: SHIPPED | OUTPATIENT
Start: 2020-10-12 | End: 2020-10-19

## 2020-10-12 RX ORDER — LISINOPRIL 40 MG/1
40 TABLET ORAL DAILY
Qty: 30 TAB | Refills: 3 | Status: SHIPPED | OUTPATIENT
Start: 2020-10-12

## 2020-10-12 RX ORDER — SODIUM CHLORIDE FOR INHALATION 3 %
4 VIAL, NEBULIZER (ML) INHALATION ONCE
Status: COMPLETED | OUTPATIENT
Start: 2020-10-12 | End: 2020-10-12

## 2020-10-12 RX ADMIN — PANTOPRAZOLE SODIUM 40 MG: 40 TABLET, DELAYED RELEASE ORAL at 05:59

## 2020-10-12 RX ADMIN — SODIUM CHLORIDE 30 MG/ML INHALATION SOLUTION 4 ML: 30 SOLUTION INHALANT at 11:32

## 2020-10-12 RX ADMIN — HYDRALAZINE HYDROCHLORIDE 20 MG: 20 INJECTION, SOLUTION INTRAMUSCULAR; INTRAVENOUS at 00:05

## 2020-10-12 RX ADMIN — SPIRONOLACTONE 25 MG: 25 TABLET ORAL at 08:14

## 2020-10-12 RX ADMIN — CARVEDILOL 6.25 MG: 6.25 TABLET, FILM COATED ORAL at 08:14

## 2020-10-12 RX ADMIN — ACETAMINOPHEN 650 MG: 325 TABLET, FILM COATED ORAL at 08:13

## 2020-10-12 RX ADMIN — INSULIN LISPRO 2 UNITS: 100 INJECTION, SOLUTION INTRAVENOUS; SUBCUTANEOUS at 11:57

## 2020-10-12 RX ADMIN — ASPIRIN 81 MG: 81 TABLET, CHEWABLE ORAL at 08:13

## 2020-10-12 RX ADMIN — Medication 30 ML: at 05:59

## 2020-10-12 RX ADMIN — AMLODIPINE BESYLATE 10 MG: 10 TABLET ORAL at 08:14

## 2020-10-12 RX ADMIN — Medication 1 AMPULE: at 08:14

## 2020-10-12 RX ADMIN — AMIODARONE HYDROCHLORIDE 200 MG: 200 TABLET ORAL at 08:13

## 2020-10-12 RX ADMIN — LISINOPRIL 40 MG: 20 TABLET ORAL at 08:14

## 2020-10-12 RX ADMIN — RDII 250 MG CAPSULE 500 MG: at 08:14

## 2020-10-12 RX ADMIN — Medication 10 ML: at 05:59

## 2020-10-12 RX ADMIN — INSULIN LISPRO 2 UNITS: 100 INJECTION, SOLUTION INTRAVENOUS; SUBCUTANEOUS at 07:32

## 2020-10-12 RX ADMIN — Medication 1 SPRAY: at 11:58

## 2020-10-12 NOTE — PROGRESS NOTES
Bedside and Verbal shift change report given to self (oncoming nurse) by Shelly Martin (offgoing nurse). Report included the following information SBAR and Kardex.

## 2020-10-12 NOTE — DISCHARGE INSTR - DIET
· Good nutrition is important when healing from an illness, injury, or surgery. Follow any nutrition recommendations given to you during your hospital stay. · If you were given an oral nutrition supplement while in the hospital, continue to take this supplement at home. You can take it with meals, in-between meals, and/or before bedtime. These supplements can be purchased at most local grocery stores, pharmacies, and chain super-stores. · If you have any questions about your diet or nutrition, call the hospital and ask for the dietitian. Discharge Nutrition Plan: Continue Oral Nutrition Supplement (ONS) at discharge. Recommend Glucerna or a comparable/similar product Twice daily for 30 days unless otherwise directed by your Primary Care Physician.

## 2020-10-12 NOTE — PROGRESS NOTES
Critical Care Daily Progress Note: 10/12/2020  Admission Date: 9/29/2020     The patient's chart is reviewed and the patient is discussed with the staff. 57 y/o female Patient had CABG x 4 on 9/29. Currently is sedated in CV-ICU and orally intubated receiving  mechanical ventilation. Pt presented to the ER at [facility] on 9/18/2020 with chest pain and dyspnea. She was found to be in afib RVR. She was started on cardizem and admitted by cardiology. 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 She was extubated on day of surgery but reintubated 10/2. Eventually extubated and weaned to RA. Post op complications with  Afib. Subjective:     POD # 13, on RA. HR controlled.   Hypertensive   Hoarse with C/O throat soreness with cough      Current Facility-Administered Medications   Medication Dose Route Frequency    spironolactone (ALDACTONE) tablet 25 mg  25 mg Oral DAILY    amLODIPine (NORVASC) tablet 10 mg  10 mg Oral DAILY    lisinopriL (PRINIVIL, ZESTRIL) tablet 40 mg  40 mg Oral DAILY    carvediloL (COREG) tablet 6.25 mg  6.25 mg Oral BID WITH MEALS    diphenhydrAMINE (BENADRYL) capsule 25 mg  25 mg Oral QHS PRN    pantoprazole (PROTONIX) tablet 40 mg  40 mg Oral ACB    Saccharomyces boulardii (FLORASTOR) capsule 500 mg  500 mg Oral BID    insulin glargine (LANTUS) injection 28 Units  28 Units SubCUTAneous QHS    magnesium hydroxide (MILK OF MAGNESIA) 400 mg/5 mL oral suspension 30 mL  30 mL Oral DAILY PRN    alum-mag hydroxide-simeth (MYLANTA) oral suspension 30 mL  30 mL Oral Q4H PRN    magnesium oxide (MAG-OX) tablet 400 mg  400 mg Oral TID PRN    magnesium oxide (MAG-OX) tablet 400 mg  400 mg Oral QID PRN    potassium chloride (K-DUR, KLOR-CON) SR tablet 20 mEq  20 mEq Oral BID PRN    potassium chloride (K-DUR, KLOR-CON) SR tablet 40 mEq  40 mEq Oral BID PRN    insulin lispro (HUMALOG) injection 0-10 Units  0-10 Units SubCUTAneous AC&HS    hydrocortisone (ANUSOL-HC) 2.5 % rectal cream   PeriANAL QID PRN    hydrALAZINE (APRESOLINE) 20 mg/mL injection 20 mg  20 mg IntraVENous Q4H PRN    sodium chloride (NS) flush 30 mL  30 mL InterCATHeter Q8H    sodium chloride (NS) flush 30 mL  30 mL InterCATHeter PRN    traMADoL (ULTRAM) tablet 100 mg  100 mg Oral Q6H PRN    oxyCODONE-acetaminophen (PERCOCET) 5-325 mg per tablet 1 Tab  1 Tab Oral Q4H PRN    alcohol 62% (NOZIN) nasal  1 Ampule  1 Ampule Topical Q12H    rosuvastatin (CRESTOR) tablet 40 mg  40 mg Oral QHS    amiodarone (CORDARONE) tablet 200 mg  200 mg Oral Q12H    ondansetron (ZOFRAN) injection 4 mg  4 mg IntraVENous Q4H PRN    aspirin chewable tablet 81 mg  81 mg Oral DAILY    acetaminophen (TYLENOL) tablet 650 mg  650 mg Oral Q4H PRN     Review of Systems  + debility  -cough, SOB    Objective:     Vitals:    10/11/20 2251 10/12/20 0001 10/12/20 0306 10/12/20 0733   BP: (!) 199/78 (!) 173/76 (!) 176/74 (!) 175/73   Pulse: 61 (!) 59 74 68   Resp:   18 18   Temp:   97.6 °F (36.4 °C) 98.9 °F (37.2 °C)   SpO2:   97% 97%   Weight:   184 lb 1.6 oz (83.5 kg)    Height:           Intake/Output Summary (Last 24 hours) at 10/12/2020 0840  Last data filed at 10/12/2020 0820  Gross per 24 hour   Intake 1200 ml   Output 1900 ml   Net -700 ml     Physical Exam:            EENMT:  Sclera clear, pupils equal, oral mucosa moist, hoarse   Respiratory: clear, on RA  Cardiovascular:  RRR with no M,G,R;  Gastrointestinal:  soft with no tenderness; positive bowel sounds present  Musculoskeletal:  warm with no cyanosis, no lower extremity edema  Skin:  no jaundice or ecchymosis  Neurologic: alert and oriented x 4       CXR: improved infiltrates 10/10          Ventilat  ABG:   No results for input(s): PH, PCO2, PO2, HCO3, PHI, PCO2I, PO2I, HCO3I in the last 72 hours.   LAB  Recent Labs     10/12/20  0602 10/11/20  2118 10/11/20  1620 10/11/20  1100 10/10/20  2039   GLUCPOC 184* 203* 165* 268* 133*     Recent Labs     10/10/20  0316   WBC 15.9*   HGB 9.5*   HCT 28.7*        Recent Labs     10/12/20  0312 10/11/20  0336 10/10/20  1522  10/10/20  0316    141  --   --  140   K 4.4 3.2* 3.3*   < > 2.6*   * 108*  --   --  104   CO2 23 26  --   --  29   * 194*  --   --  143*   BUN 22 29*  --   --  35*   CREA 1.31* 1.45*  --   --  1.54*   MG 2.3 2.3  --   --  2.5*   CA 8.5 8.3  --   --  8.0*    < > = values in this interval not displayed. No results for input(s): LCAD, LAC in the last 72 hours. Assessment:  (Medical Decision Making)     Hospital Problems  Date Reviewed: 9/22/2020          Codes Class Noted POA    YURI (acute kidney injury) ([facility] ICD-10-CM: N17.9  ICD-9-CM: 584.9  10/5/2020 Yes        Pulmonary edema ICD-10-CM: J81.1  ICD-9-CM: 021  10/1/2020 Yes        Anxiety (Chronic) ICD-10-CM: F41.9  ICD-9-CM: 300.00  10/1/2020 Yes        AMOR on CPAP (Chronic) ICD-10-CM: G47.33, Z99.89  ICD-9-CM: 327.23, V46.8  9/30/2020 Yes        Atherosclerosis of native coronary artery of native heart with unstable angina pectoris (HCC) (Chronic) ICD-10-CM: I25.110  ICD-9-CM: 414.01, 411.1  9/29/2020 Unknown        * (Principal) S/P CABG x 4 ICD-10-CM: Z95.1  ICD-9-CM: V45.81  9/29/2020 Unknown        CAD (coronary artery disease) (Chronic) ICD-10-CM: I25.10  ICD-9-CM: 414.00  9/29/2020 Yes        Paroxysmal atrial fibrillation ([facility] ICD-10-CM: I48.0  ICD-9-CM: 427.31  9/18/2020 Yes            S/P CABG 9/29, intubated, extubated, re-intubated. Post PAF, atelectasis, volume overload and HAP. Diuresed, completed abx 10/9 (cultures negative) and weaned to RA. Plan:  (Medical Decision Making)     Now on RA, HR controlled. CXR improved on 10/10 but not resolved which is expected. Clinically improved. Continue CPAP. Control HTN. Add chloraseptic spray, may nee short coarse of steroids with cough, inflammation S/P intubation  Will need O/P CXR in 4 weeks     Respiratory status stable on RA.   No further respiratory needs noted at this time.  Will sign off of the treatment team.  Please call if we can be of further assistance. Thank you. More than 50% of the time documented was spent in face-to-face contact with the patient and in the care of the patient on the floor/unit where the patient is located. I have spoken with and examined the patient. I agree with the above assessment and plan as documented. Myesha Sparrow NP     Gen: pleasant, hoarse  Lungs:  Faint wheeze, somewhat coarse  Heart:  RRR with no Murmur/Rubs/Gallops  Abd: NTND  Ext: no edema    --continue airway clearance measures including mucinex 1200mg bid  --if cough not improved within one week, patient agrees to call us to arrange earlier followup. --otherwise will see in one month with repeat CXR.       Bharat Nunes MD

## 2020-10-12 NOTE — PROGRESS NOTES
Discharge instructions reviewed with patient. Prescriptions given for medications and med info sheets provided for all new medications. Opportunity for questions provided. patientt voiced understanding of all discharge instructions.    IV sutures and monitor removed

## 2020-10-12 NOTE — DIABETES MGMT
Patient to discharge home. Per provider plan is for patient to discharge on orals (likely Januvia) check glucose 3 times a day and follow up with PCP. Plan to educate patient prior to discharge. Patient will need a glucometer kit prescription at discharge so she may obtain the meter covered by her insurance.

## 2020-10-12 NOTE — PROGRESS NOTES
[facility] CARDIOLOGY PROGRESS NOTE           10/12/2020 9:20 AM    Admit Date: 9/29/2020    Admit Diagnosis: Atherosclerosis of native coronary artery with unstable angina pectoris, unspecified whether native or transplanted heart ([facility] [address] Liz Majano; Paroxysmal A-fib ([address] [I48.0]; Paroxysmal A-fib ([address] [I48.0]; Atherosclerosis of native coronary artery of native heart with unstable angina pectoris ([address] [I25.110];CAD (coronary artery disease) [I25.10]; Paroxysmal atrial fibrillation (HCC) [I48.0]    Assessment:   Principal Problem:    S/P CABG x 4 (9/29/2020)    Active Problems:    Paroxysmal atrial fibrillation (HCC) (9/18/2020)      Paroxysmal A-fib (HCC) (9/29/2020)      Atherosclerosis of native coronary artery of native heart with unstable angina pectoris ([facility] [address] (9/29/2020)      CAD (coronary artery disease) (9/29/2020)      AMRO on CPAP (9/30/2020)      Pulmonary edema (10/1/2020)      Anxiety (10/1/2020)      YURI (acute kidney injury) ([facility] [address] (10/5/2020)        Plan:   Atrial fibrillation, paroxysmal, post surgical  CAD s/p CABG  Essential HTN  CKD  GERD  Recovering PNA  Pulmonary edema  AMOR  DMII  Bradycardia     -AF - stable on amiodarone and coreg. Remains in NSR.   -HTN - increased ACEi dose, K low, will check cinthia/renin level and start K-sparing diuretic. On [facility] CC[facility] as well. Remains severely elevated, will watch for now. If she's truly resistant, I would favor assessing secondary causes of HTN as outpt, I suspect it's related to her post surgical state. -CKD - improving daily. Carefully following labs. K > 4.0. Will stop daily K, likely spironolactone effect. -CAD - OMT. -Stroke ppx - would add [address] when clinically able unless contraindication despite MADI clip. Likely will only need for 3-4 months if truly post surgical AF.   -DMII - continue with insulin. -AMOR - on CPAP. Thank you for allowing me to participate in the electrophysiologic care of this most pleasant patient.  Please feel free to contact me if there are any questions or concerns. Rosie Drew. Adam Cuenca MD, MS  Clinical Cardiac Electrophysiology  [facility]    Subjective:   No complaints this AM, no chest pain or shortness of breath. Stable, in NSR. Interval History: (History of pertinent interval events obtained from nursing staff)    ROS:  GEN:  No fever or chills  Cardiovascular:  As noted above  Pulmonary:  As noted above  Neuro:  No new focal motor or sensory loss      Objective:     Vitals:    10/11/20 2223 10/11/20 2251 10/12/20 0001 10/12/20 0306   BP: (!) 202/84 (!) 199/78 (!) 173/76 (!) 176/74   Pulse: 63 61 (!) 59 74   Resp: 18   18   Temp: 97.8 °F (36.6 °C)   97.6 °F (36.4 °C)   SpO2: 98%   97%   Weight:    184 lb 1.6 oz (83.5 kg)   Height:           Physical Exam:  Bertell Raspberry, Well Nourished, No Acute Distress, Alert & Oriented x 3, appropriate mood. Neck- supple, no JVD  CV- regular rate and rhythm no MRG, central SS C/D/I. Lung- clear bilaterally  Abd- soft, nontender, nondistended  Ext- no edema bilaterally.   Skin- warm and dry    Current Facility-Administered Medications   Medication Dose Route Frequency    spironolactone (ALDACTONE) tablet 25 mg  25 mg Oral DAILY    amLODIPine (NORVASC) tablet 10 mg  10 mg Oral DAILY    potassium chloride (K-DUR, KLOR-CON) SR tablet 40 mEq  40 mEq Oral BID    lisinopriL (PRINIVIL, ZESTRIL) tablet 40 mg  40 mg Oral DAILY    carvediloL (COREG) tablet 6.25 mg  6.25 mg Oral BID WITH MEALS    diphenhydrAMINE (BENADRYL) capsule 25 mg  25 mg Oral QHS PRN    pantoprazole (PROTONIX) tablet 40 mg  40 mg Oral ACB    Saccharomyces boulardii (FLORASTOR) capsule 500 mg  500 mg Oral BID    insulin glargine (LANTUS) injection 28 Units  28 Units SubCUTAneous QHS    magnesium hydroxide (MILK OF MAGNESIA) 400 mg/5 mL oral suspension 30 mL  30 mL Oral DAILY PRN    alum-mag hydroxide-simeth (MYLANTA) oral suspension 30 mL  30 mL Oral Q4H PRN    magnesium oxide (MAG-OX) tablet 400 mg  400 mg Oral TID PRN    magnesium oxide (MAG-OX) tablet 400 mg  400 mg Oral QID PRN    potassium chloride (K-DUR, KLOR-CON) SR tablet 20 mEq  20 mEq Oral BID PRN    potassium chloride (K-DUR, KLOR-CON) SR tablet 40 mEq  40 mEq Oral BID PRN    insulin lispro (HUMALOG) injection 0-10 Units  0-10 Units SubCUTAneous AC&HS    hydrocortisone (ANUSOL-HC) 2.5 % rectal cream   PeriANAL QID PRN    hydrALAZINE (APRESOLINE) 20 mg/mL injection 20 mg  20 mg IntraVENous Q4H PRN    sodium chloride (NS) flush 30 mL  30 mL InterCATHeter Q8H    sodium chloride (NS) flush 30 mL  30 mL InterCATHeter PRN    traMADoL (ULTRAM) tablet 100 mg  100 mg Oral Q6H PRN    oxyCODONE-acetaminophen (PERCOCET) 5-325 mg per tablet 1 Tab  1 Tab Oral Q4H PRN    alcohol 62% (NOZIN) nasal  1 Ampule  1 Ampule Topical Q12H    rosuvastatin (CRESTOR) tablet 40 mg  40 mg Oral QHS    amiodarone (CORDARONE) tablet 200 mg  200 mg Oral Q12H    ondansetron (ZOFRAN) injection 4 mg  4 mg IntraVENous Q4H PRN    aspirin chewable tablet 81 mg  81 mg Oral DAILY    acetaminophen (TYLENOL) tablet 650 mg  650 mg Oral Q4H PRN       Data Review:   Recent Results (from the past 24 hour(s))   GLUCOSE, POC    Collection Time: 10/11/20 11:00 AM   Result Value Ref Range    Glucose (POC) 268 (H) 65 - 100 mg/dL   GLUCOSE, POC    Collection Time: 10/11/20  4:20 PM   Result Value Ref Range    Glucose (POC) 165 (H) 65 - 100 mg/dL   GLUCOSE, POC    Collection Time: 10/11/20  9:18 PM   Result Value Ref Range    Glucose (POC) 203 (H) 65 - 995 mg/dL   METABOLIC PANEL, BASIC    Collection Time: 10/12/20  3:12 AM   Result Value Ref Range    Sodium 138 136 - 145 mmol/L    Potassium 4.4 3.5 - 5.1 mmol/L    Chloride 109 (H) 98 - 107 mmol/L    CO2 23 21 - 32 mmol/L    Anion gap 6 (L) 7 - 16 mmol/L    Glucose 184 (H) 65 - 100 mg/dL    BUN 22 8 - 23 MG/DL    Creatinine 1.31 (H) 0.6 - 1.0 MG/DL    GFR est AA 53 (L) >60 ml/min/1.73m2    GFR est non-AA 44 (L) >60 ml/min/1.73m2    Calcium 8.5 8.3 - 10.4 MG/DL   MAGNESIUM    Collection Time: 10/12/20  3:12 AM   Result Value Ref Range    Magnesium 2.3 1.8 - 2.4 mg/dL   GLUCOSE, POC    Collection Time: 10/12/20  6:02 AM   Result Value Ref Range    Glucose (POC) 184 (H) 65 - 100 mg/dL       EKG:  (EKG has been independently visualized by me with interpretation below): NSR, normal axis, no ischemia.

## 2020-10-12 NOTE — DISCHARGE INSTRUCTIONS
The patient is being discharged home with home health services in stable condition on a low saturated fat, low cholesterol and low salt diet. The patient is instructed to advance activities as tolerated to the limit of fatigue or shortness of breath. The patient is instructed to avoid all heavy lifting or activities that strain the chest or upper arm muscles. Strenuous activity should be avoided. The patient is instructed to watch for signs of infection which include: increasing area of redness, fever or purulent drainage at the incision site. The patient is instructed to call the office or return to the ER for immediate evaluation for any shortness of breath or chest pain not relieved by NTG. Coronary Artery Bypass Graft: What to Expect at Home  Your Recovery     Coronary artery bypass graft (CABG) is surgery to treat coronary artery disease. The surgery helps blood make a detour, or bypass, around one or more narrowed or blocked coronary arteries. Coronary arteries are the blood vessels that bring blood to the heart. Your doctor did the surgery through a cut, called an incision, in your chest.  You will feel tired and sore for the first few weeks after surgery. You may have some brief, sharp pains on either side of your chest. Your chest, shoulders, and upper back may ache. The incision in your chest and the area where the healthy vein was taken may be sore or swollen. These symptoms usually get better after 4 to 6 weeks. You will probably be able to do many of your usual activities after 4 to 6 weeks. But for 2 to 3 months you will not be able to lift heavy objects or do activities that strain your chest or upper arm muscles. At first you may notice that you get tired easily and need to rest often. It may take 1 to 2 months to get your energy back. Some people find that they are more emotional after this surgery. You may cry easily or show emotion in ways that are unusual for you.  This is common and may last for up to a year. Some people get depressed after the surgery. Talk with your doctor if you have sadness that continues or you are concerned about how you are feeling. Treatment and other support can help you feel better. Even though the surgery may improve your symptoms, you will still need to make changes in your lifestyle to lower your risk of a heart attack or stroke. It will be important to eat a heart-healthy diet, get regular exercise, not smoke, take your heart medicines, and reduce stress. You will likely start a cardiac rehabilitation (rehab) program in the hospital. You will continue with this rehab program after you go home to help you recover and prevent problems with your heart. Talk to your doctor about whether rehab is right for you. This care sheet gives you a general idea about how long it will take for you to recover. But each person recovers at a different pace. Follow the steps below to get better as quickly as possible. How can you care for yourself at home? Activity    · Rest when you feel tired. Getting enough sleep will help you recover. Try to sleep on your back for 4 to 6 weeks while your breastbone (sternum) heals. This usually takes about 4 to 6 weeks.     · Try to walk each day. Start by walking a little more than you did the day before. Bit by bit, increase the amount you walk. Walking boosts blood flow and helps prevent pneumonia and constipation.     · Avoid strenuous activities, such as bicycle riding, jogging, weight lifting, or heavy aerobic exercise, until your doctor says it is okay.     · For 3 months, avoid activities that strain your chest or upper arm muscles. This includes pushing a  or vacuum, mopping floors, or swinging a golf club or tennis racquet.     · For 2 to 3 months, avoid lifting anything that would make you strain. This may include a child, heavy grocery bags and milk containers, a heavy briefcase or backpack, or cat litter or dog food bags.   · Hold a pillow firmly over your chest incision when you cough or take deep breaths. This will support your chest and reduce your pain.     · Do breathing exercises at home as instructed by your doctor. This will help prevent pneumonia.     · Ask your doctor when you can drive again.     · You will probably need to take 4 to 12 weeks off from work. It depends on the type of work you do and how you feel.     · You may shower as usual. Pat the incision dry. Do not take a bath for the first 3 weeks, or until your doctor tells you it is okay.     · Do not swim or use a hot tub for at least 1 month, or until your doctor says it is okay.     · Ask your doctor when it is okay for you to have sex. Diet    · Eat a heart-healthy diet. If you have not been eating this way, talk to your doctor. You also may want to talk to a dietitian. A dietitian can help you learn about healthy foods.     · Drink plenty of fluids (unless your doctor tells you not to).     · You may notice that your bowel movements are not regular right after your surgery. This is common. Try to avoid constipation and straining with bowel movements. You may want to take a fiber supplement every day. If you have not had a bowel movement after a couple of days, ask your doctor about taking a mild laxative. Medicines    · Your doctor will tell you if and when you can restart your medicines. He or she will also give you instructions about taking any new medicines.     · If you take aspirin or some other blood thinner, ask your doctor if and when to start taking it again. Make sure that you understand exactly what your doctor wants you to do.     · Your doctor may give you medicines to prevent blood clots, keep your heartbeat steady, and lower your blood pressure and cholesterol. Take your medicines exactly as prescribed. Call your doctor if you think you are having a problem with your medicine.     · Be safe with medicines.  Take pain medicines exactly as directed. ? If the doctor gave you a prescription medicine for pain, take it as prescribed. ? If you are not taking a prescription pain medicine, ask your doctor if you can take an over-the-counter medicine. ? Do not take aspirin, ibuprofen (Advil, Motrin), naproxen (Aleve), or other nonsteroidal anti-inflammatory drugs (NSAIDs) unless your doctor says it is okay.     · If you think your pain medicine is making you sick to your stomach:  ? Take your medicine after meals (unless your doctor has told you not to). ? Ask your doctor for a different pain medicine.     · If your doctor prescribed antibiotics, take them as directed. Do not stop taking them just because you feel better. You need to take the full course of antibiotics. Incision care    · If you have strips of tape on the incisions the doctor made, leave the tape on for a week or until it falls off.     · Wash the area daily with warm, soapy water, and pat it dry. Don't use hydrogen peroxide or alcohol, which can slow healing. You may cover the area with a gauze bandage if it weeps or rubs against clothing. Change the bandage every day.     · Keep the area clean and dry.     · Do not use any creams, lotions, powders, ointments, or oils unless your doctor tells you it is okay.     · If you have an incision in your leg:  ? Wear support stockings on your legs during the day for the first 2 weeks. You can take the stockings off at night while you sleep. ? Raise your legs above the level of your heart whenever you lie down for the first 4 to 6 weeks. Other instructions    · Keep track of your weight. Weigh yourself every day at the same time of day, on the same scale, in the same amount of clothing. A sudden increase in weight can be a sign of a problem with your heart. Tell your doctor if you suddenly gain weight, such as 3 pounds or more in 2 to 3 days.     · Do not smoke. Smoking can make it harder for you to recover.  And it will raise the chances of your arteries narrowing again. If you need help quitting, talk to your doctor about stop-smoking programs and medicines. These can increase your chances of quitting for good. Follow-up care is a key part of your treatment and safety. Be sure to make and go to all appointments, and call your doctor if you are having problems. It's also a good idea to know your test results and keep a list of the medicines you take. When should you call for help? Call 911 anytime you think you may need emergency care. For example, call if:    · You passed out (lost consciousness).     · You have severe trouble breathing.     · You have sudden chest pain and shortness of breath, or you cough up blood.     · You have severe pain in your chest.     · You have symptoms of a heart attack. These may include:  ? Chest pain or pressure, or a strange feeling in the chest.  ? Sweating. ? Shortness of breath. ? Nausea or vomiting. ? Pain, pressure, or a strange feeling in the back, neck, jaw, or upper belly or in one or both shoulders or arms. ? Lightheadedness or sudden weakness. ? A fast or irregular heartbeat. After you call 911, the  may tell you to chew 1 adult-strength or 2 to 4 low-dose aspirin. Wait for an ambulance. Do not try to drive yourself.     · You have angina symptoms (such as chest pain or pressure) that do not go away with rest or are not getting better within 5 minutes after you take a dose of nitroglycerin. Call your doctor now or seek immediate medical care if:    · You have pain that does not get better after you take pain medicine.     · You have a fever over 100°F.     · You have loose stitches, or your incision comes open.     · Bright red blood has soaked through the bandage over your incision.     · You have signs of infection, such as:  ? Increased pain, swelling, warmth, or redness. ? Red streaks leading from the incision. ? Pus draining from the incision. ?  Swollen lymph nodes in your neck, armpits, or groin. ? A fever.     · You have signs of a blood clot in a leg. If you had a vein removed from your leg, you may have tenderness and swelling while your leg heals. But signs of a blood clot may be in a different part of your leg and may include:  ? Pain in your calf, back of the knee, thigh, or groin. ? Redness and swelling in your leg or groin.     · Your heartbeat feels very fast or slow, skips beats, or flutters.     · You are dizzy or lightheaded, or you feel like you may faint.     · You have new or increased shortness of breath. Watch closely for changes in your health, and be sure to contact your doctor if:    · You gain weight suddenly, such as 3 pounds or more in 2 to 3 days.     · You have increased swelling in your legs, ankles, or feet.     · You have any concerns about your incision.     · You feel very sad or have other signs of depression, such as trouble sleeping or eating.     · You have questions about diet, exercise, quitting smoking, or stress reduction after surgery. Where can you learn more? Go to http://www.gray.com/  Enter F759 in the search box to learn more about \"Coronary Artery Bypass Graft: What to Expect at Home. \"  Current as of: December 16, 2019               Content Version: 12.6  © 2367-1332 [employer]. Care instructions adapted under license by [employer] (which disclaims liability or warranty for this information). If you have questions about a medical condition or this instruction, always ask your healthcare professional. [place] [address] any warranty or liability for your use of this information. Coronary Artery Disease: Care Instructions  Your Care Instructions     The heart is a muscle, and like any muscle, it needs blood to work well.  Coronary artery disease occurs when the arteries that bring oxygen-rich blood to your heart have a buildup of plaque--deposits of fats and other substances. Plaque can reduce blood flow to the heart muscle. This can cause angina symptoms such as chest pain or pressure. A heart attack can happen if blood flow is completely blocked. You can do a lot to improve your health and prevent a heart attack. Eating healthy food, not smoking, getting regular exercise, and taking your medicine are the main things you can do every day to stay healthy. Follow-up care is a key part of your treatment and safety. Be sure to make and go to all appointments, and call your doctor if you are having problems. It's also a good idea to know your test results and keep a list of the medicines you take. How can you care for yourself at home? Medicines    · Be safe with medicines. Take your medicines exactly as prescribed. Call your doctor if you think you are having a problem with your medicine. You will get more details on the specific medicines your doctor prescribes.     · You will take medicines that lower your risk of a heart attack and lower your risk of dying early from heart disease. These medicines include:  ? Angiotensin-converting enzyme (ACE) inhibitors or angiotensin II receptor blockers (ARBs). They lower blood pressure. ? Aspirin and other blood thinners. They prevent blood clots that could cause a heart attack. ? Beta-blockers. They lower the heart's workload. ? Statins and other cholesterol medicines. They lower cholesterol.     · If your doctor has given you nitroglycerin for angina symptoms (such as chest pain or pressure) keep it with you at all times. If you have symptoms, sit down and rest, and take the first dose of nitroglycerin as directed. If your symptoms get worse or are not getting better within 5 minutes, call 911 right away. Stay on the phone.  The emergency  will give you further instructions.     · Do not take any over-the-counter medicines, vitamins, or herbal products without talking to your doctor first.   Lifestyle  Ask your doctor if a cardiac rehab program is right for you. Cardiac rehab can help you make lifestyle changes. In cardiac rehab, a team of health professionals provides education and support to help you make new, healthy habits.    · Do not smoke. Avoid secondhand smoke too. Smoking can increase your risk of a heart attack or stroke. If you need help quitting, talk to your doctor about stop-smoking programs and medicines. These can increase your chances of quitting for good.     · Eat heart-healthy foods. These include vegetables, fruits, nuts, beans, lean meat, fish, and whole grains. Limit saturated fat, sodium, and alcohol. Limit drinks and foods with added sugar.     · If your doctor recommends it, get more exercise. Ask your doctor what level of exercise is safe for you. Walking is a good choice. Bit by bit, increase the amount you walk every day. Try for at least 30 minutes on most days of the week. You also may want to swim, bike, or do other activities.     · Stay at a healthy weight. Lose weight if you need to.     · Manage other health problems. These include diabetes, high blood pressure, and high cholesterol. If you think you may have a problem with alcohol or drug use, talk to your doctor.     · If you have angina symptoms, pay attention to your symptoms. This can help you see what causes them and what is typical for you.     · Avoid colds and flu. Get a pneumococcal vaccine shot. If you have had one before, ask your doctor whether you need another dose. Get a flu vaccine every year. If you must be around people with colds or flu, wash your hands often.     · If you think you have symptoms of depression, talk to your doctor. Symptoms include feeling sad or hopeless most of the time, or losing interest in activities that used to make you happy. When should you call for help? Call 911 anytime you think you may need emergency care. For example, call if:    · You have symptoms of a heart attack.  These may include:  ? Chest pain or pressure, or a strange feeling in the chest.  ? Sweating. ? Shortness of breath. ? Nausea or vomiting. ? Pain, pressure, or a strange feeling in the back, neck, jaw, or upper belly or in one or both shoulders or arms. ? Lightheadedness or sudden weakness. ? A fast or irregular heartbeat. After you call 911, the  may tell you to chew 1 adult-strength or 2 to 4 low-dose aspirin. Wait for an ambulance. Do not try to drive yourself.     · You have angina symptoms (such as chest pain or pressure) that do not go away with rest or are not getting better within 5 minutes after you take a dose of nitroglycerin.     · You passed out (lost consciousness). Call your doctor now or seek immediate medical care if:    · You are having angina symptoms, such as chest pain or pressure, more often than usual, or they are different or worse than usual.     · You have new or increased shortness of breath.     · You are dizzy or lightheaded, or you feel like you may faint. Watch closely for changes in your health, and be sure to contact your doctor if you have any problems. Where can you learn more? Go to http://www.gray.com/  Enter R311 in the search box to learn more about \"Coronary Artery Disease: Care Instructions. \"  Current as of: December 16, 2019               Content Version: 12.6  © 2640-0448 [employer]. Care instructions adapted under license by [employer] (which disclaims liability or warranty for this information). If you have questions about a medical condition or this instruction, always ask your healthcare professional. [place] [address] any warranty or liability for your use of this information. Heart-Healthy Diet: Care Instructions  Your Care Instructions     A heart-healthy diet has lots of vegetables, fruits, nuts, beans, and whole grains, and is low in salt.  It limits foods that are high in saturated fat, such as meats, cheeses, and fried foods. It may be hard to change your diet, but even small changes can lower your risk of heart attack and heart disease. Follow-up care is a key part of your treatment and safety. Be sure to make and go to all appointments, and call your doctor if you are having problems. It's also a good idea to know your test results and keep a list of the medicines you take. How can you care for yourself at home? Watch your portions  · Learn what a serving is. A \"serving\" and a \"portion\" are not always the same thing. Make sure that you are not eating larger portions than are recommended. For example, a serving of pasta is ½ cup. A serving size of meat is 2 to 3 ounces. A 3-ounce serving is about the size of a deck of cards. Measure serving sizes until you are good at [place]" them. Keep in mind that restaurants often serve portions that are 2 or 3 times the size of one serving. · To keep your energy level up and keep you from feeling hungry, eat often but in smaller portions. · Eat only the number of calories you need to stay at a healthy weight. If you need to lose weight, eat fewer calories than your body burns (through exercise and other physical activity). Eat more fruits and vegetables  · Eat a variety of fruit and vegetables every day. Dark green, deep orange, red, or yellow fruits and vegetables are especially good for you. Examples include spinach, carrots, peaches, and berries. · Keep carrots, celery, and other veggies handy for snacks. Buy fruit that is in season and store it where you can see it so that you will be tempted to eat it. · Cook dishes that have a lot of veggies in them, such as stir-fries and soups. Limit saturated and trans fat  · Read food labels, and try to avoid saturated and trans fats. They increase your risk of heart disease. · Use olive or canola oil when you cook. · Bake, broil, grill, or steam foods instead of frying them.   · Choose lean meats instead of high-fat meats such as hot dogs and sausages. Cut off all visible fat when you prepare meat. · Eat fish, skinless poultry, and meat alternatives such as soy products instead of high-fat meats. Soy products, such as tofu, may be especially good for your heart. · Choose low-fat or fat-free milk and dairy products. Eat foods high in fiber  · Eat a variety of grain products every day. Include whole-grain foods that have lots of fiber and nutrients. Examples of whole-grain foods include oats, whole wheat bread, and brown rice. · Buy whole-grain breads and cereals, instead of white bread or pastries. Limit salt and sodium  · Limit how much salt and sodium you eat to help lower your blood pressure. · Taste food before you salt it. Add only a little salt when you think you need it. With time, your taste buds will adjust to less salt. · Eat fewer snack items, fast foods, and other high-salt, processed foods. Check food labels for the amount of sodium in packaged foods. · Choose low-sodium versions of canned goods (such as soups, vegetables, and beans). Limit sugar  · Limit drinks and foods with added sugar. These include candy, desserts, and soda pop. Limit alcohol  · Limit alcohol to no more than 2 drinks a day for men and 1 drink a day for women. Too much alcohol can cause health problems. When should you call for help? Watch closely for changes in your health, and be sure to contact your doctor if:    · You would like help planning heart-healthy meals. Where can you learn more? Go to http://www.varela.com/  Enter V137 in the search box to learn more about \"Heart-Healthy Diet: Care Instructions. \"  Current as of: August 22, 2019               Content Version: 12.6  © 4061-8722 [employer]. Care instructions adapted under license by [employer] (which disclaims liability or warranty for this information).  If you have questions about a medical condition or this instruction, always ask your healthcare professional. [place] [address] any warranty or liability for your use of this information.

## 2020-10-12 NOTE — PROGRESS NOTES
Problem: Mobility Impaired (Adult and Pediatric)  Goal: *Acute Goals and Plan of Care (Insert Text)  Outcome: Progressing Towards Goal  Note: LTG:  (1.)Ms. Daly will move from supine to sit and sit to supine , scoot up and down, and roll side to side with INDEPENDENT within 7 treatment day(s). (2.)Ms. Daly will transfer from bed to chair and chair to bed with INDEPENDENT using the least restrictive device within 7 treatment day(s). (3.)Ms. Daly will ambulate with INDEPENDENT for 500+ feet with the least restrictive device within 7 treatment day(s) while maintaining normal vital signs. (4.)Ms. Daly will perform 3-4 steps with HR and SBA within 7 treatment days for safety ascending and descending stairs. (5.)Ms. Daly will be independent in HEP for B LE strength and mobility within 7 treatment days.    ___________________________________________________________________________________________      PHYSICAL THERAPY: Daily Note and AM 10/12/2020  INPATIENT: PT Visit Days : 5  Payor: Anoop Shaw / Plan: [facility] / Product Type: PPO /       NAME/AGE/GENDER: Jennie Lopes is a 56 y.o. female   PRIMARY DIAGNOSIS: Atherosclerosis of native coronary artery with unstable angina pectoris, unspecified whether native or transplanted heart (HCC) [I25.110]  Paroxysmal A-fib ([facility]) [I48.0]  Paroxysmal A-fib ([facility] [address] [I48.0]  Atherosclerosis of native coronary artery of native heart with unstable angina pectoris ([facility] [address] [I25.110]  CAD (coronary artery disease) [I25.10]  Paroxysmal atrial fibrillation (HCC) [I48.0] S/P CABG x 4 S/P CABG x 4  Procedure(s) (LRB):  BRONCHOSCOPY ROOM 104 (N/A)  BRONCHIAL ALVEOLAR LAVAGE (N/A)  10 Days Post-Op  ICD-10: Treatment Diagnosis:    · Generalized Muscle Weakness (M62.81)  · Difficulty in walking, Not elsewhere classified (R26.2)   Precaution/Allergies:  Influenza virus vaccines      ASSESSMENT:     Ms. Victoriano Nassar presents with decreased bed mobility, transfers, ambulation, balance, activity tolerance, strength and overall general functional mobility s/p hospital admission with above, now CABG x 4 on 20. Pt with complications post surgery, extubated  10/8/20  No O2 . Prior to surgery, pt works as hospice RN, independent at baseline. Pt lives alone per chart, pt states \"in-laws are in and out\", spouse . Patient is in recliner and agreeable to therapy. Is alert and communicating well however the patient sounds a little congested and c/o a sore throat. Sit to stand with close supervision. Standing balance good. Gait training with rolling walker x 200 feet with stand by assist.  Patient is returned to the recliner and after a short rest break the patient performed therapeutic exercises. Did very well. Making progress towards goals. Patient is pleasant and cooperative. Agree with home with home health. Will continue PT efforts. This section established at most recent assessment   PROBLEM LIST (Impairments causing functional limitations):  1. Decreased Strength  2. Decreased ADL/Functional Activities  3. Decreased Transfer Abilities  4. Decreased Ambulation Ability/Technique  5. Decreased Balance  6. Decreased Activity Tolerance  7. Decreased [place] with Home Exercise Program  8. Decreased Cognition   INTERVENTIONS PLANNED: (Benefits and precautions of physical therapy have been discussed with the patient.)  1. Balance Exercise  2. Bed Mobility  3. Family Education  4. Gait Training  5. Home Exercise Program (HEP)  6. Therapeutic Activites  7. Therapeutic Exercise/Strengthening  8. Transfer Training     TREATMENT PLAN: Frequency/Duration: twice daily for duration of hospital stay  Rehabilitation Potential For Stated Goals: Good     REHAB RECOMMENDATIONS (at time of discharge pending progress):    Placement: It is my opinion, based on this patient's performance to date, that Ms. Daly may benefit from [employer] after discharge due to the functional deficits listed above that are likely to improve with skilled rehabilitation because he/she has multiple medical issues that affect his/her functional mobility in the community. Equipment:    None at this time              HISTORY:   History of Present Injury/Illness (Reason for Referral):  S/p CABG x 4, see H & P  Past Medical History/Comorbidities:   Ms. Juan Manuel Benjamin  has a past medical history of Anxiety, Borderline diabetes, Endometrial cancer ([facility] [address] Essential hypertension (9/18/2020), Heart failure ([facility] [address] History of anemia, Mixed hyperlipidemia (9/18/2020), Paroxysmal atrial fibrillation ([facility] [address] (9/18/2020), and Sleep apnea. She also has no past medical history of Difficult intubation, Malignant hyperthermia due to anesthesia, Nausea & vomiting, or Pseudocholinesterase deficiency. Ms. Juan Manuel Benjamin  has a past surgical history that includes hx hysterectomy; hx heart catheterization (09/18/2020); hx cyst removal; hx cyst removal; and hx orthopaedic. Social History/Living Environment:   Home Environment: Private residence  # Steps to Enter: 0  One/Two Story Residence: Two story, live on 1st floor  Living Alone: Yes  Support Systems: Child(licha), Family member(s)  Patient Expects to be Discharged to[de-identified] Private residence  Current DME Used/Available at Home: CPAP  Prior Level of Function/Work/Activity:  Lives alone, independent at baseline  Personal Factors:          Sex:  female        Age:  56 y.o.         Overall Behavior:  agreeable, confused   Number of Personal Factors/Comorbidities that affect the Plan of Care:  Cancer, DM  1-2: MODERATE COMPLEXITY   EXAMINATION:   Most Recent Physical Functioning:   Gross Assessment:                  Posture:     Balance:  Sitting: Intact  Sitting - Static: Good (unsupported)  Sitting - Dynamic: Fair (occasional)  Standing: Impaired  Standing - Static: Good  Standing - Dynamic : Fair Bed Mobility:     Wheelchair Mobility:     Transfers:  Sit to Stand: Modified independent  Stand to Sit: Modified independent  Gait:     Speed/Lila: Slow  Gait Abnormalities: Decreased step clearance  Distance (ft): 200 Feet (ft)  Assistive Device: Walker, rolling  Ambulation - Level of Assistance: Stand-by assistance;Contact guard assistance      Body Structures Involved:  1. Heart  2. Lungs  3. Muscles Body Functions Affected:  1. Cardio  2. Respiratory  3. Movement Related Activities and Participation Affected:  1. General Tasks and Demands  2. Mobility  3. Self Care   Number of elements that affect the Plan of Care: 4+: HIGH COMPLEXITY   CLINICAL PRESENTATION:   Presentation: Evolving clinical presentation with changing clinical characteristics: MODERATE COMPLEXITY   CLINICAL DECISION MAKIN [facility] Inpatient Short Form  How much difficulty does the patient currently have. .. Unable A Lot A Little None   1. Turning over in bed (including adjusting bedclothes, sheets and blankets)? [] 1   [x] 2   [] 3   [] 4   2. Sitting down on and standing up from a chair with arms ( e.g., wheelchair, bedside commode, etc.)   [] 1   [x] 2   [] 3   [] 4   3. Moving from lying on back to sitting on the side of the bed? [] 1   [x] 2   [] 3   [] 4   How much help from another person does the patient currently need. .. Total A Lot A Little None   4. Moving to and from a bed to a chair (including a wheelchair)? [] 1   [] 2   [x] 3   [] 4   5. Need to walk in hospital room? [] 1   [] 2   [x] 3   [] 4   6. Climbing 3-5 steps with a railing? [] 1   [x] 2   [] 3   [] 4   © , Trustees of [address] [place] [address] [address] [id number], under license to [employer]. All rights reserved      Score:  Initial: 14 Most Recent: X (Date: -- )    Interpretation of Tool:  Represents activities that are increasingly more difficult (i.e. Bed mobility, Transfers, Gait).     Medical Necessity:     · Patient is expected to demonstrate progress in   · strength, range of motion, balance, and coordination  ·  to   · decrease assistance required with overall functional mobility, transfers, ambulation  · .  · Patient demonstrates   · good  ·  rehab potential due to higher previous functional level. Reason for Services/Other Comments:  · Patient   · continues to require present interventions due to patient's inability to perform bed mobility, transfers, ambulation safely and effectively at prior level of function of independent. · .   Use of outcome tool(s) and clinical judgement create a POC that gives a: Clear prediction of patient's progress: LOW COMPLEXITY            TREATMENT:   (In addition to Assessment/Re-Assessment sessions the following treatments were rendered)   Pre-treatment Symptoms/Complaints:  \"Good morining\"  Pain: Initial:   Pain Intensity 1: 0  Post Session:  0/10     Therapeutic Activity: (    13 min): Therapeutic activities including bed transfers, Toilet transfers and Ambulation on level ground to improve mobility, strength, balance, and coordination. Required CGA to promote static and dynamic balance in standing . Zohra Staggers Therapeutic Exercise: ( 11 minutes):  Exercises per grid below to improve mobility, strength, balance and coordination. Required minimum  verbal cues. Progressed repetitions and complexity of movement as indicated.            Date:  10/09/20 Date:  10/12/20 Date:     ACTIVITY/EXERCISE AM PM AM PM AM PM   LAQ 10  15      Shoulder shrugs 10  15      Gluteal sets 10  15      marching 10  15      Ankle pumps 10  15      abduction 10  15               B = bilateral; AA = active assistive; A = active; P = passive      Braces/Orthotics/Lines/Etc:   ·   Treatment/Session Assessment:    · Response to Treatment: mobility is improving  · Interdisciplinary Collaboration:   o Physical Therapy Assistant  o Registered Nurse  · After treatment position/precautions:   o Up in chair  o Bed alarm/tab alert on  o Bed/Chair-wheels locked  o Call light within reach  o RN notified   · Compliance with Program/Exercises: Compliant all of the time  · Recommendations/Intent for next treatment session: \"Next visit will focus on advancements to more challenging activities\".   Total Treatment Duration:  PT Patient Time In/Time Out  Time In: 0849  Time Out: 0913    Aris Bess PTA

## 2020-10-12 NOTE — PROGRESS NOTES
Problem: Diabetes Self-Management  Goal: *Disease process and treatment process  Description: Define diabetes and identify own type of diabetes; list 3 options for treating diabetes. Outcome: Resolved/Met  Goal: *Incorporating nutritional management into lifestyle  Description: Describe effect of type, amount and timing of food on blood glucose; list 3 methods for planning meals. Outcome: Resolved/Met  Goal: *Incorporating physical activity into lifestyle  Description: State effect of exercise on blood glucose levels. Outcome: Resolved/Met  Goal: *Developing strategies to promote health/change behavior  Description: Define the ABC's of diabetes; identify appropriate screenings, schedule and personal plan for screenings. Outcome: Resolved/Met  Goal: *Using medications safely  Description: State effect of diabetes medications on diabetes; name diabetes medication taking, action and side effects. Outcome: Resolved/Met  Goal: *Monitoring blood glucose, interpreting and using results  Description: Identify recommended blood glucose targets  and personal targets. Outcome: Resolved/Met  Goal: *Prevention, detection, treatment of acute complications  Description: List symptoms of hyper- and hypoglycemia; describe how to treat low blood sugar and actions for lowering  high blood glucose level. Outcome: Resolved/Met  Goal: *Prevention, detection and treatment of chronic complications  Description: Define the natural course of diabetes and describe the relationship of blood glucose levels to long term complications of diabetes.   Outcome: Resolved/Met  Goal: *Developing strategies to address psychosocial issues  Description: Describe feelings about living with diabetes; identify support needed and support network  Outcome: Resolved/Met  Goal: *Sick day guidelines  Outcome: Resolved/Met  Goal: *Patient Specific Goal (EDIT GOAL, INSERT TEXT)  Outcome: Resolved/Met     Problem: Diabetes Self-Management  Goal: *Incorporating physical activity into lifestyle  Description: State effect of exercise on blood glucose levels. Outcome: Resolved/Met     Problem: Diabetes Self-Management  Goal: *Using medications safely  Description: State effect of diabetes medications on diabetes; name diabetes medication taking, action and side effects.   Outcome: Resolved/Met

## 2020-10-12 NOTE — PROGRESS NOTES
Comprehensive Nutrition Assessment    Type and Reason for Visit: Reassess    Nutrition Recommendations/Plan:    Continue with current diet and ONS    Nutrition Assessment:  Patient admitted for CABG. She has a PMH significant for HTNm CHF, HLD, afib, NSTEMI. S/p CABG 10/1. Pt's diet was advanced to [facility] on 10/8 with addition of ONS of Glucerna BID. Pt reports consuming ~50% of meals and 1 or 2 Glucerna shakes per day. Pt requested recipes before discharging today. Provided ADA website. Estimated Daily Nutrient Needs:  Energy (kcal):  9484-7431(96-59 kcal/kg (85.4kg))  Protein (g):  79-99(1-1.2 g/kg (78.9 kg) post op wound healing)         Wounds:    Surgical wound       Current Nutrition Therapies:  DIET NUTRITIONAL SUPPLEMENTS Breakfast, Dinner; Glucerna Shake ( )  DIET DIABETIC CONSISTENT CARB Regular    Anthropometric Measures:  · Height:  5' 2.99\" (160 cm)  · Current Body Wt:  83.5 kg (184 lb 1.4 oz)(bed scale; 10/12)   · Admission Body Wt:  181 lb 7 oz(standing scale)    · Ideal Body Wt:  115 lbs:  160.1 %   · BMI Category:  Obese class 1 (BMI 30.0-34. 9)       Nutrition Diagnosis:   · Inadequate oral intake related to (loss of appetite) as evidenced by intake 26-50%(requiring ONS to meet estimated needs)    Nutrition Interventions:   Food and/or Nutrient Delivery: Continue current diet, Continue oral nutrition supplement     Goals:  Meet >75% estimated nutrition needs with 7 days       Nutrition Monitoring and Evaluation:   Food/Nutrient Intake Outcomes: Food and nutrient intake, Supplement intake     Discharge Planning:    Continue oral nutrition supplement     Electronically signed by Sujatha Connolly MS, RDN, LD 10/12/2020 at 12:52 PM  Contact: [phone]

## 2020-10-12 NOTE — DISCHARGE SUMMARY
Discharge Summary     Patient ID:  Lizandro Paez  [id number]  56 y.o.  1960    Admit date: 9/29/2020    Discharge date:  10/12/2020    Admitting Physician: Fannie Dickinson MD     Discharge Physician: FLAKITO Reina/Dr. Anastasia Palma    Admission Diagnoses: Atherosclerosis of native coronary artery with unstable angina pectoris, unspecified whether native or transplanted heart ([facility] [address] [I25.110]  Paroxysmal A-fib ([address] [I48.0]  Paroxysmal A-fib ([address] [I48.0]  Atherosclerosis of native coronary artery of native heart with unstable angina pectoris ([facility] [address] [I25.110]  CAD (coronary artery disease) [I25.10]  Paroxysmal atrial fibrillation ([address] [I48.0]    Discharge Diagnoses:   Patient Active Problem List    Diagnosis Date Noted    YURI (acute kidney injury) ([facility] [address] 10/05/2020    Pulmonary edema 10/01/2020    Anxiety 10/01/2020    AMOR on CPAP 09/30/2020    Atherosclerosis of native coronary artery of native heart with unstable angina pectoris ([facility] [address] 09/29/2020    S/P CABG x 4 09/29/2020    Suspected sleep apnea 09/29/2020    CAD (coronary artery disease) 09/29/2020    Paroxysmal atrial fibrillation ([facility] [address] 09/18/2020    Essential hypertension 09/18/2020    Mixed hyperlipidemia 09/18/2020    Uterine cancer ([facility] [address] 09/18/2020    Chest pain 09/18/2020    Atrial fibrillation ([facility] 09/18/2020       Procedures this admission:  Coronary Artery Bypass Graft Surgery, MAZE, clipping of left atrial appendage  Consults: Pulmonary/Intensive Care, Nephrology, Infectious Disease, Cardiology     Hospital Course: Patient presented for elective CABG. She was recently admitted to [facility] with NSTEMI and a-fib with RVR. She had sudden onset of severe chest discomfort. She converted to NSR and cath showed underlying severe multivessel CAD. She was discharged from [facility] and seen in the office in consultation. She states she has a strong family history of CAD. CABG/MAZE was planned. Xarelto was held.  She underwent CABG X 4 with LIMA to the diagonal, reverse SVG to the distal PDA and sequential SVG to the OM as well as MAZE and clipping of the left atrial appendage on 9/29/20. Post operatively, she was extubated to Airvo. She continued to have increased O2 requirement. She was placed on BIPAP. She was diuresed with lasix. She was started on Precedex for agitation. She became febrile. She required emergent re-intubation on 10/2. She underwent a bronchoscopy with airway inspection. There was little to no secretions throughout the airways. She developed YURI and required pressor support. She was started on broad spectrum antibiotics. All cultures were negative but she remained febrile. Pressor support was weaned. Nephrology followed and placed pt on lasix drip for diuresis. She had large volume of diuresis but developed worsening renal function so lasix was held. She was started on Lantus for control of blood glucose levels. She was not on any meds for DM prior to admission. Her pre-op Hgb A1C was 8.2. Infectious disease was consulted for ongoing fevers of unknown origin. US was obtained of upper an lower extremities that were negative for DVT. Antibiotics were changed by ID. She had intermittent a-fib. She was extubated again on 10/8 to Airvo. She did well and was quickly weaned to O2 by NC. Renal function improved. She was able to walk with PT. She was transferred to  stepdown. Her BP was elevated. ACE-I was resumed by nephrology. Norvasc was added as well for BP control. She had recurrent atrial fibrillation with RVR and was seen by cardiology. She converted back to sinus rhythm on IV amiodarone. She was weaned off of O2. She was stable off of antibiotics with no recurrent fever. She did have a productive cough and was started on Mucinex. The morning of 10/12/20, patient was feeling well and ambulating without any symptoms. She remained in sinus rhythm. BP was improved but remained elevated at times.  Pt/family state her BP has been uncontrolled for quite some time. Creatinine had returned to baseline. Patient was determined stable and ready for discharge. Patient was instructed on the importance of medication compliance and outpatient follow up. For maximized medical therapy for CAD, patient will continue ASA, BB, ACE-I, and statin as well. Xarelto is resumed. She is discharged on an amiodarone taper. For DM, she is discharged on Januvia. She was on Lantus and SSI during admission. She is establishing care with a new PCP and will follow up on outpatient basis for further management of DM.     *She is discharged on Keflex for erythema at vein harvest site at left groin. The patient will have close transitional care follow up with [facility] Dr. King Almanzar on October 20th at 9:45am [facility] [place]). The patient will follow up with Dr. Lukas Hannah on November 10th at 2:40pm and has been referred to cardiac rehab. DISPOSITION: The patient is being discharged home with home health services in stable condition on a low saturated fat, low cholesterol and low salt diet. The patient is instructed to advance activities as tolerated to the limit of fatigue or shortness of breath. The patient is instructed to avoid all heavy lifting or activities that strain the chest or upper arm muscles. Strenuous activity should be avoided. The patient is instructed to watch for signs of infection which include: increasing area of redness, fever or purulent drainage at the incision site. The patient is instructed to call the office or return to the ER for immediate evaluation for any shortness of breath or chest pain not relieved by NTG.     Discharge Exam:   Visit Vitals  BP (!) 147/67   Pulse (!) 58   Temp 98.6 °F (37 °C)   Resp 20   Ht 5' 3\" (1.6 m)   Wt 184 lb 1.6 oz (83.5 kg)   SpO2 99%   BMI 32.61 kg/m²         Physical Exam:  General: Well Developed, Well Nourished, No Acute Distress, Alert & Oriented  Neck: supple, no JVD  Heart: S1S2 with RRR without murmurs or gallops  Lungs: Clear throughout auscultation bilaterally without adventitious sounds  Abd: soft, nontender, nondistended, with good bowel sounds  Ext: warm, no edema  Sternal incision: clean, dry, and intact  Skin: warm and dry      Recent Results (from the past 24 hour(s))   GLUCOSE, POC    Collection Time: 10/11/20  4:20 PM   Result Value Ref Range    Glucose (POC) 165 (H) 65 - 100 mg/dL   GLUCOSE, POC    Collection Time: 10/11/20  9:18 PM   Result Value Ref Range    Glucose (POC) 203 (H) 65 - 949 mg/dL   METABOLIC PANEL, BASIC    Collection Time: 10/12/20  3:12 AM   Result Value Ref Range    Sodium 138 136 - 145 mmol/L    Potassium 4.4 3.5 - 5.1 mmol/L    Chloride 109 (H) 98 - 107 mmol/L    CO2 23 21 - 32 mmol/L    Anion gap 6 (L) 7 - 16 mmol/L    Glucose 184 (H) 65 - 100 mg/dL    BUN 22 8 - 23 MG/DL    Creatinine 1.31 (H) 0.6 - 1.0 MG/DL    GFR est AA 53 (L) >60 ml/min/1.73m2    GFR est non-AA 44 (L) >60 ml/min/1.73m2    Calcium 8.5 8.3 - 10.4 MG/DL   MAGNESIUM    Collection Time: 10/12/20  3:12 AM   Result Value Ref Range    Magnesium 2.3 1.8 - 2.4 mg/dL   GLUCOSE, POC    Collection Time: 10/12/20  6:02 AM   Result Value Ref Range    Glucose (POC) 184 (H) 65 - 100 mg/dL   GLUCOSE, POC    Collection Time: 10/12/20 11:11 AM   Result Value Ref Range    Glucose (POC) 184 (H) 65 - 100 mg/dL         Patient Instructions:   Current Discharge Medication List      START taking these medications    Details   acetaminophen (TYLENOL) 325 mg tablet Take 2 Tabs by mouth every four (4) hours as needed for Fever. Qty:        carvediloL (COREG) 6.25 mg tablet Take 1 Tab by mouth two (2) times daily (with meals). Qty: 60 Tab, Refills: 3      amLODIPine (NORVASC) 10 mg tablet Take 1 Tab by mouth daily. Qty: 30 Tab, Refills: 3      spironolactone (ALDACTONE) 25 mg tablet Take 1 Tab by mouth daily.   Qty: 30 Tab, Refills: 3      cephALEXin (KEFLEX) 500 mg capsule Take 1 Cap by mouth three (3) times daily for 7 days. Qty: 21 Cap, Refills: 0      guaiFENesin ER (MUCINEX) 600 mg ER tablet Take 2 Tabs by mouth two (2) times a day for 14 days. Qty: 56 Tab, Refills: 0      SITagliptin (JANUVIA) 100 mg tablet Take 1 Tab by mouth daily. Qty: 30 Tab, Refills: 1         CONTINUE these medications which have CHANGED    Details   lisinopriL (PRINIVIL, ZESTRIL) 40 mg tablet Take 1 Tab by mouth daily. Qty: 30 Tab, Refills: 3      amiodarone (CORDARONE) 200 mg tablet Take 1 Tab by mouth every twelve (12) hours for 7 days, THEN 1 Tab daily for 7 days. Qty: 21 Tab, Refills: 0         CONTINUE these medications which have NOT CHANGED    Details   ZINC PO Take  by mouth. Hold for procedure. rosuvastatin (CRESTOR) 40 mg tablet Take 1 Tab by mouth nightly. Qty: 30 Tab, Refills: 11      aspirin 81 mg chewable tablet Take 81 mg by mouth daily. Take morning of procedure. Qty:        nitroglycerin (NITROSTAT) 0.4 mg SL tablet 1 Tab by SubLINGual route every five (5) minutes as needed for Chest Pain. Up to 3 doses. Qty: 1 Bottle, Refills: 5      rivaroxaban (XARELTO) 20 mg tab tablet Take 1 Tab by mouth daily (with breakfast). Qty: 30 Tab, Refills: 11      furosemide (Lasix) 40 mg tablet Take 40 mg by mouth daily as needed. Do not take morning of procedure.          STOP taking these medications       metoprolol tartrate (LOPRESSOR) 25 mg tablet Comments:   Reason for Stopping:                 Signed:  Orquidea Miller PA-C  10/12/2020  12:21 PM

## 2020-10-12 NOTE — PROGRESS NOTES
Infectious Disease Progress Note    Today's Date: 10/12/2020   Admit Date: 2020    Impression:   · HAP; resolved  · YURI, improved  · CABG x 4     Plan:   · She has been stable off of antibiotics x 4 days. No fever, no dyspnea. She does have a productive cough  · No further antibiotics indicated at this time. I will sign off. Call back with any concerns. Anti-infectives:   · Cefazolin ( -)  · Ceftriaxone (10/2-10/3)  · Zosyn (10/3 - 10/7)  · Cefepime (10/7- 10/9)  · Vancomycin (10/3-10/5, 10/7- 10/9)    Subjective:   She is ambulating in the room and sitting up with no difficulty. She does report frequent productive cough. No dyspnea/hypoxia/fever. Allergies   Allergen Reactions    Influenza Virus Vaccines Other (comments)     Gives her the flu. Review of Systems:  A comprehensive review of systems was negative except for that written in the History of Present Illness. Objective:     Visit Vitals  BP (!) 175/73   Pulse 68   Temp 98.9 °F (37.2 °C)   Resp 18   Ht 5' 3\" (1.6 m)   Wt 83.5 kg (184 lb 1.6 oz)   SpO2 99%   BMI 32.61 kg/m²     Temp (24hrs), Av.1 °F (36.7 °C), Min:97.6 °F (36.4 °C), Max:98.9 °F (37.2 °C)     General: Alert, no acute distress; sitting in chair  Head:    Normocephalic, atraumatic  Eyes:   Anicteric sclerae  Mouth:  Moist mucosa, op clear  Neck:   Supple, symmetrical, trachea midline, no JVD  Lungs:   Rales left base  CV:   Regular rate and rhythm without audible murmur; sternal incision bandaged; Abdomen:  Soft, non tender, mildly distended; active bowel sounds noted;   Extremities:  Trace LE edema; LLE incisions with mild erythema around the sites, no drainage  Pulses:  2+ DP bilaterally  Musculoskeletal: Moves all extremities with equal strength, no deformity  Skin:   No acute rash  Psych:   Alert, appropriate without evidence of thought disorder  Lines:    benign    Data Review:     CBC:  Recent Labs     10/10/20  0316   WBC 15.9*   HGB 9.5*   HCT 28.7*          BMP:  Recent Labs     10/12/20  0312 10/11/20  0336 10/10/20  1522  10/10/20  0316   CREA 1.31* 1.45*  --   --  1.54*   BUN 22 29*  --   --  35*    141  --   --  140   K 4.4 3.2* 3.3*   < > 2.6*   * 108*  --   --  104   CO2 23 26  --   --  29   AGAP 6* 7  --   --  7   * 194*  --   --  143*    < > = values in this interval not displayed. LFTS:  No results for input(s): TBILI, ALT, AP, TP, ALB in the last 72 hours. No lab exists for component: SGOT    Microbiology:     All Micro Results     Procedure Component Value Units Date/Time    CULTURE, BLOOD [[id number]] Collected:  10/07/20 1406    Order Status:  Completed Specimen:  Blood Updated:  10/12/20 0907     Special Requests: --        LEFT  HAND       Culture result: NO GROWTH 5 DAYS       CULTURE, BLOOD [[id number]] Collected:  10/07/20 1312    Order Status:  Completed Specimen:  Blood Updated:  10/12/20 0907     Special Requests: --        LEFT  HAND       Culture result: NO GROWTH 5 DAYS       ATYPICAL PNEUMONIA BY PCR,BRONCHOALVEOLAR LAVAGE - INCLUDES: MYCOPLASMA PNEUMONIAE AND Heaven Polka [[id number]] Collected:  10/02/20 1245    Order Status:  Completed Specimen:  Bronchial lavage Updated:  10/08/20 0737     M. pneumoniae Not Detected        C. pneumonaie Not Detected        Comment: (NOTE)  This test was developed and its performance  characteristics determined by [facility]. It has not  been cleared or approved by the U.S. Food and Drug  Administration. Results should be used in conjunction with  clinical findings, and should not form the sole basis for  a diagnosis or treatment decision. Performed At: [facility]  [address] [place], [place] [id number]  Georgette Breaux PhD [id number]         L. PNEUMOPHILA BY Uriel Urena [[id number]] Collected:  10/02/20 1245    Order Status:  Completed Specimen:  Bronchial lavage Updated:  10/08/20 0737     L. pneumophila Not Detected        Comment: (NOTE)  This test was developed and its performance  characteristics determined by [facility]. It has not  been cleared or approved by the U.S. Food and Drug  Administration. Results should be used in conjunction with  clinical findings, and should not form the sole basis for  a diagnosis or treatment decision.   Performed At: [facility] [id number]  John Dennis PhD [id number]          Alva Legionella Not Detected       CULTURE, RESPIRATORY/SPUTUM/BRONCH W Vida Friends [[id number]] Collected:  10/06/20 9840    Order Status:  Completed Specimen:  Sputum from Endotracheal aspirate Updated:  10/08/20 0720     Special Requests: NO SPECIAL REQUESTS        GRAM STAIN 5 TO 25 WBCS SEEN PER OIF      NO EPITHELIAL CELLS SEEN         NO DEFINITE ORGANISM SEEN         3+ MUCUS PRESENT        Culture result: NO GROWTH 2 DAYS       FUNGUS CULTURE AND SMEAR Elliott Meier [[id number]] Collected:  10/02/20 1245    Order Status:  Completed Specimen:  Other from Miscellaneous sample Updated:  10/07/20 1436     Source BRONCHIAL LAVAGE        Fungus stain Direct Inoculation     Fungus (Mycology) Culture Other source received     Comment: (NOTE)  Performed At: [address] [id number]  Paula Peters MD [id number]         RESPIRATORY VIRAL PANEL, PCR [[id number]] Collected:  10/02/20 1245    Order Status:  Completed Specimen:  Sputum from Respiratory sample Updated:  10/07/20 1237     Source BRONCHIAL LAVAGE        Influenza A Negative        Influenza B Negative        RSV A Negative        RSV B Negative        Parainfluenza 1 Negative        Parainfluenza 2 Negative        Parainfluenza 3 Negative        Rhinovirus Negative        Metapneumovirus Negative        Adenovirus Negative        Comment: (NOTE)  [facility] test number [id number] Respiratory Virus Profile (RVP), PCR will  become non-orderable on 11/02/2020. See Leaky for alternatives. Performed At: [address] [id number]  Cammie Simon MD [id number]         CULTURE, BLOOD [[id number]] Collected:  10/02/20 0734    Order Status:  Completed Specimen:  Blood Updated:  10/07/20 0834     Special Requests: --        LEFT  HAND       Culture result: NO GROWTH 5 DAYS       CULTURE, BLOOD [[id number]] Collected:  10/02/20 0728    Order Status:  Completed Specimen:  Blood Updated:  10/07/20 0834     Special Requests: --        RIGHT  HAND       Culture result: NO GROWTH 5 DAYS       CULTURE, RESPIRATORY/SPUTUM/BRONCH Gloria Dub [[id number]] Collected:  10/02/20 1245    Order Status:  Completed Specimen:  Sputum from Bronchial lavage Updated:  10/04/20 1015     Special Requests: NO SPECIAL REQUESTS        GRAM STAIN 0 TO 15 WBC'S SEEN PER OIF      NO EPITHELIAL CELLS SEEN         NO DEFINITE ORGANISM SEEN        Culture result: NO GROWTH 2 DAYS       CULTURE, URINE [[id number]] Collected:  10/02/20 0900    Order Status:  Completed Specimen:  Cath Urine Updated:  10/04/20 0714     Special Requests: NO SPECIAL REQUESTS        Culture result: NO GROWTH 2 DAYS       AFB (MYCOBACTERIUM) CULTURE & SMEAR W/REFLEX ID Mono Young NOCARDIA [[id number]] Collected:  10/02/20 1245    Order Status:  Completed Specimen:  Miscellaneous sample Updated:  10/03/20 1735     Source BRONCHIAL LAVAGE        AFB Specimen processing Concentration     Acid Fast Smear Negative        Comment: (NOTE)  Performed At: [facility]  [employer] [address] [id number]  Cammie Simon MD [id number]          Acid Fast Culture PENDING          Imaging:   CXR (10/10/2020)  IMPRESSION:   1.  New trace left basilar pleural effusion which could indicate slightly  worsened heart failure given the additional stable associated findings described  above.     Signed By: Dary Moncada MD     October 12, 2020

## 2020-10-12 NOTE — PROGRESS NOTES
Problem: Falls - Risk of  Goal: *Absence of Falls  Description: Document Julissa Lemus Fall Risk and appropriate interventions in the flowsheet. Outcome: Progressing Towards Goal  Note: Fall Risk Interventions:  Mobility Interventions: Bed/chair exit alarm, Communicate number of staff needed for ambulation/transfer, Patient to call before getting OOB, PT Consult for mobility concerns, Strengthening exercises (ROM-active/passive), Utilize walker, cane, or other assistive device    Mentation Interventions: Adequate sleep, hydration, pain control, Bed/chair exit alarm, Door open when patient unattended, Increase mobility    Medication Interventions: Bed/chair exit alarm, Patient to call before getting OOB, Teach patient to arise slowly    Elimination Interventions: Bed/chair exit alarm, Call light in reach, Patient to call for help with toileting needs, Stay With Me (per policy)    History of Falls Interventions: Bed/chair exit alarm, Consult care management for discharge planning, Door open when patient unattended         Problem: Pressure Injury - Risk of  Goal: *Prevention of pressure injury  Description: Document Jayy Scale and appropriate interventions in the flowsheet.   Outcome: Progressing Towards Goal  Note: Pressure Injury Interventions:  Sensory Interventions: Assess changes in LOC, Maintain/enhance activity level, Pressure redistribution bed/mattress (bed type)    Moisture Interventions: Absorbent underpads, Minimize layers    Activity Interventions: Increase time out of bed, Pressure redistribution bed/mattress(bed type), PT/OT evaluation    Mobility Interventions: HOB 30 degrees or less, Pressure redistribution bed/mattress (bed type), PT/OT evaluation    Nutrition Interventions: Document food/fluid/supplement intake, Offer support with meals,snacks and hydration    Friction and Shear Interventions: Minimize layers

## 2020-10-12 NOTE — ADT AUTH CERT NOTES
Utilization Reviews  
 
   
Coronary Artery Bypass Graft (CABG) - Care Day 14 (10/12/2020) by Bon Samuel RN  
 
   
Review Status  Review Entered Completed  10/12/2020 09:19   
   
Criteria Review Care Day: 14 Care Date: 10/12/2020 Level of Care: ICU Guideline Day 3 Clinical Status   
(X) * Dangerous arrhythmia absent 10/12/2020 09:19:14 EDT by Rudy Grimaldo   
  -AF - stable on amiodarone and coreg. Remains in NSR.   
(X) * Pain absent or managed 10/12/2020 09:19:14 EDT by Anthony Mar none noted Activity   
(X) * Minimal ambulatory activity 10/12/2020 09:19:14 EDT by Anthony Mar ambulate in [place], bathroom privileges with assist   
Routes   
(X) * Oral medications 10/12/2020 09:19:14 EDT by Rudy Grimaldo   
  see notes   
(X) * Advance diet 10/12/2020 09:19:14 EDT by Rudy Grimaldo   
  Diabetic CC diet Interventions   
(X) * Chest tube absent 10/12/2020 09:19:14 EDT by Anthony Mar none noted ( ) * Central lines absent Medications   
(X) * Epidural analgesics absent [M]   
(X) Prophylactic antibiotics discontinued (X) Aspirin   
(X) Beta-blocker   
(X) Statin (X) Antihypertensive medication if needed * Milestone Additional Notes 10/12 IP   
BP:               (!) 175/73 Pulse:            80 Resp:     18 Temp:     98.9 °F (37.2 °C) SpO2:     97% RA   
  
    
Assessment:   
Principal Problem:   
  S/P CABG x 4 (9/29/2020)   
  Active Problems:   
  Paroxysmal atrial fibrillation ([address] (9/18/2020)     
  Paroxysmal A-fib ([address] (9/29/2020)     
  Atherosclerosis of native coronary artery of native heart with unstable angina pectoris ([address] (9/29/2020)     
  CAD (coronary artery disease) (9/29/2020)     
  AMOR on CPAP (9/30/2020)     
  Pulmonary edema (10/1/2020)     
  Anxiety (10/1/2020)     
  YURI (acute kidney injury) ([address] (10/5/2020)     
    
    
Plan: Atrial fibrillation, paroxysmal, post surgical   
CAD s/p CABG Essential HTN   
CKD GERD Recovering PNA Pulmonary edema AMOR DMII Bradycardia   
    
-AF - stable on amiodarone and coreg. Remains in NSR.   
-HTN - increased ACEi dose, K low, will check cinthia/renin level and start K-sparing diuretic. On [address] CCB as well.  Remains severely elevated, will watch for now. If she's truly resistant, I would favor assessing secondary causes of HTN as outpt, I suspect it's related to her post surgical state. -CKD - improving daily. Carefully following labs. K > 4.0. Will stop daily K, likely spironolactone effect. -CAD - OMT. -Stroke ppx - would add [address] when clinically able unless contraindication despite MADI clip. Likely will only need for 3-4 months if truly post surgical AF.   
-DMII - continue with insulin. -AMOR - on CPAP.   
    
Meds:   
· spironolactone (ALDACTONE) tablet 25 mg 25 mg Oral DAILY · amLODIPine (NORVASC) tablet 10 mg 10 mg Oral DAILY · lisinopriL (PRINIVIL, ZESTRIL) tablet 40 mg 40 mg Oral DAILY · carvediloL (COREG) tablet 6.25 mg 6.25 mg Oral BID WITH MEALS · pantoprazole (PROTONIX) tablet 40 mg 40 mg Oral ACB · Saccharomyces boulardii (FLORASTOR) capsule 500 mg 500 mg Oral BID · insulin glargine (LANTUS) injection 28 Units 28 Units SubCUTAneous QHS · insulin lispro (HUMALOG) injection 0-10 Units 0-10 Units SubCUTAneous AC&HS · hydrALAZINE (APRESOLINE) 20 mg/mL injection 20 mg 20 mg IntraVENous Q4H   
· rosuvastatin (CRESTOR) tablet 40 mg 40 mg Oral QHS · amiodarone (CORDARONE) tablet 200 mg 200 mg Oral Q12H · aspirin chewable tablet 81 mg 81 mg Oral DAILY Labs:   
Sodium: 138 Potassium: 4.4 Chloride: 109 (H)   
CO2: 23 Anion gap: 6 (L) Glucose: 184 (H) BUN: 22   
Creatinine: 1.31 (H) Calcium: 8.5 Magnesium: 2.3 GFR est non-AA: 44 (L)   
GFR est AA: 53 (L)   
  
   
 Coronary Artery Bypass Graft (CABG) - Care Day 13 (10/11/2020) by Ilana العراقي RN  
 
   
Review Status  Review Entered Completed  10/12/2020 09:10   
   
Criteria Review Care Day: 13 Care Date: 10/11/2020 Level of Care: ICU Guideline Day 3 Clinical Status ( ) * Dangerous arrhythmia absent   
(X) * Pain absent or managed 10/12/2020 09:10:49 EDT by Shaista Zamora Patient feels better today. Activity   
(X) * Minimal ambulatory activity Routes   
(X) * Oral medications 10/12/2020 09:10:49 EDT by Eduardo Damian   
  see notes   
(X) * Advance diet 10/12/2020 09:10:49 EDT by Eduardo Damian   
  diabetic CC diet Interventions   
(X) * Chest tube absent   
( ) * Central lines absent Medications   
(X) * Epidural analgesics absent [M]   
(X) Prophylactic antibiotics discontinued (X) Aspirin   
(X) Beta-blocker   
(X) Statin (X) Antihypertensive medication if needed * Milestone Additional Notes 10/11 IP   
BP: (!) 166/72 Pulse:                     65 Resp:     16 Temp:     98.6 °F (37 °C) SpO2:     97% RA Patient feels better today. Bradycardia yesterday, then Afib with RVR (cardiology managing).  Hypokalemia improving.  Otherwise no issues. Physical Exam:   
General: Well Developed, Obese, No Acute Distress, Alert & Oriented x 3, Appropriate mood Neck: supple, no JVD Heart: S1S2 with RRR without murmurs or gallops Lungs: Clear throughout auscultation bilaterally Abd: soft, nontender, nondistended, with good bowel sounds Ext: no edema bilaterally Sternal incision: clean, dry, and intact Skin: warm and dry   
    
    
Assessment/Plan:   
    
Principal Problem:   
  S/P CABG x 4 (9/29/2020) Was re-intubated 10/2, now extubated and on room air, afebrile, cultures neg to date, no DVT in upper or lower extremities, on IV antibiotics, ID following    
    
Active Problems:   Paroxysmal atrial fibrillation ([address] (9/18/2020).  No amio, as per cardiology.  On carvedilol.   
    
    
  Paroxysmal A-fib ([address] (9/29/2020)   
  S/p MAZE, in sinus currently    
    
  Atherosclerosis of native coronary artery of native heart with unstable angina pectoris ([address] (9/29/2020)     
    
  CAD (coronary artery disease) (9/29/2020) S/p CABG    
    
  AMOR on CPAP (9/30/2020)     
  Pulmonary edema (10/1/2020) Improved     
  Anxiety (10/1/2020)     
    
  Acute respiratory failure with hypoxia ([address] (10/2/2020) Extubated, resolved   
    
  Bilateral pulmonary infiltrates on chest x-ray (10/2/2020) Chest xray improved, stopped lasix drip given worsening renal function   
    
  Hypotension (10/3/2020) Resolved   
    
    
  YURI (acute kidney injury) ([address] (10/5/2020) Renal function much improved, nephrology following    
    
  DM Was not on any meds at home, Hgb A1C 8.2 pre-op, on Lantus, continue to titrate as needed    
    
  Hypertension BP remains elevated today, ACE-I resumed by nephrology.  Added 10 mg Norvasc.  Cardiology added carvedilol yesterday.  Further meds per cardiology. Cardiology Note:   
Admit Diagnosis: Atherosclerosis of native coronary artery with unstable angina pectoris, unspecified whether native or transplanted heart ([address] [I25.110]; Paroxysmal A-fib ([address] [I48.0]; Paroxysmal A-fib ([address] [I48.0]; Atherosclerosis of native coronary artery of native heart with unstable angina pectoris ([address] [I25.110];CAD (coronary artery disease) [I25.10]; Paroxysmal atrial fibrillation (HCC) [I48.0]   
  Assessment:   
Principal Problem:   
  S/P CABG x 4 (9/29/2020)   
  Active Problems:   
  Paroxysmal atrial fibrillation ([address] (9/18/2020)     
  Paroxysmal A-fib ([address] (9/29/2020)     
  Atherosclerosis of native coronary artery of native heart with unstable angina pectoris ([address] (9/29/2020)   
    
   CAD (coronary artery disease) (9/29/2020)     
  AMOR on CPAP (9/30/2020)     
  Pulmonary edema (10/1/2020)     
  Anxiety (10/1/2020)     
  YURI (acute kidney injury) ([facility] (10/5/2020)     
    
    
Plan:   
Atrial fibrillation, paroxysmal, post surgical   
CAD s/p CABG Essential HTN   
CKD GERD Recovering PNA Pulmonary edema AMOR DMII Bradycardia   
    
-AF - stable on amiodarone and coreg. -HTN - increased ACEi dose, K low, will check cinthia/renin level and start K-sparing diuretic. On [address] [facility] CCB as well.     
-CKD - improving daily. Carefully following labs. -CAD - OMT. -Stroke ppx - would add [address] when clinically able unless contraindication despite MADI clip. Likely will only need for 3-4 months if truly post surgical AF.   
-DMII - continue with insulin. -AMOR - on CPAP. Labs:   
Sodium: 141 Potassium: 3.2 (L) Chloride: 108 (H)   
CO2: 26 Anion gap: 7 Glucose: 194 (H) BUN: 29 (H) Creatinine: 1.45 (H) Calcium: 8.3 Magnesium: 2.3 GFR est non-AA: 39 (L) GFR est AA: 47 (L) Meds:   
· potassium chloride (K-DUR, KLOR-CON) SR tablet 40 mEq 40 mEq Oral BID · spironolactone (ALDACTONE) tablet 25 mg 25 mg Oral DAILY · amLODIPine (NORVASC) tablet 10 mg 10 mg Oral DAILY · potassium chloride (K-DUR, KLOR-CON) SR tablet 40 mEq 40 mEq Oral BID · lisinopriL (PRINIVIL, ZESTRIL) tablet 40 mg 40 mg Oral DAILY · carvediloL (COREG) tablet 6.25 mg 6.25 mg Oral BID WITH MEALS · pantoprazole (PROTONIX) tablet 40 mg 40 mg Oral ACB · Saccharomyces boulardii (FLORASTOR) capsule 500 mg 500 mg Oral BID · insulin glargine (LANTUS) injection 28 Units 28 Units SubCUTAneous QHS · potassium chloride (K-DUR, KLOR-CON) SR tablet 20 mEq 20 mEq Oral BID PRN · potassium chloride (K-DUR, KLOR-CON) SR tablet 40 mEq 40 mEq Oral BID PRN   
· insulin lispro (HUMALOG) injection 0-10 Units 0-10 Units SubCUTAneous AC&HS   
 · hydrALAZINE (APRESOLINE) 20 mg/mL injection 20 mg 20 mg IntraVENous Q4H PRN   
· rosuvastatin (CRESTOR) tablet 40 mg 40 mg Oral QHS · amiodarone (CORDARONE) tablet 200 mg 200 mg Oral Q12H · aspirin chewable tablet 81 mg 81 mg Oral DAILY   
  
  
   
Coronary Artery Bypass Graft (CABG) - Care Day 12 (10/10/2020) by Lucretia Jones RN  
 
   
Review Status  Review Entered Completed  10/12/2020 08:59   
   
Criteria Review Care Day: 12 Care Date: 10/10/2020 Level of Care: ICU Guideline Day 3 Clinical Status ( ) * Dangerous arrhythmia absent   
(X) * Pain absent or managed 10/12/2020 08:59:04 EDT by Samaria Del Angel Patient feels better today. Activity   
(X) * Minimal ambulatory activity 10/12/2020 08:59:04 EDT by Samaria Del Angel ambulate in [place], bathroom privileges with assist   
Routes   
(X) * Oral medications 10/12/2020 08:59:04 EDT by Wallace Marks   
  see notes ( ) * Advance diet Interventions   
(X) * Chest tube absent 10/12/2020 08:59:04 EDT by Samaria Del Angel none noted ( ) * Central lines absent Medications   
(X) * Epidural analgesics absent [M]   
(X) Prophylactic antibiotics discontinued (X) Aspirin 10/12/2020 08:59:04 EDT by Wallace Marks   
  asa 81mg po qd (X) Beta-blocker 10/12/2020 08:59:04 EDT by Wallace Marks   
  coreg 6.25mg po bid   
(X) Statin 10/12/2020 08:59:04 EDT by Wallace Marks   
  lisinopril 40mg po qd (X) Antihypertensive medication if needed 10/12/2020 08:59:04 EDT by Wallace Marks   
  amLODIPine (NORVASC) tablet 10 mg   
Dose: 10 mg 
Freq: DAILY Route: PO 
ydrALAZINE (APRESOLINE) 20 mg/mL injection 20 mg   
Dose: 20 mg 
Freq: EVERY 4 HOURS AS NEEDED Route: IV x1   
* Milestone Additional Notes 10/10 IP ICU BP: (!) 198/81 Pulse: 56 Resp:  22 Temp:  97 °F (36.1 °C) SpO2: 98% RA Patient feels better today.  She was able to walk with PT.  Profound hypokalemia.  Currently receiving IV K repletion. Also has bradycardia, NSR in 50s, and hypertension.   
    
Physical Exam:   
General: Well Developed, Obese, No Acute Distress, Alert & Oriented x 3, Appropriate mood Neck: supple, no JVD Heart: S1S2 with RRR without murmurs or gallops Lungs: Clear throughout auscultation bilaterally Abd: soft, nontender, nondistended, with good bowel sounds Ext: no edema bilaterally Sternal incision: clean, dry, and intact Skin: warm and dry   
    
Physical Exam:   
General: Well Developed, Obese, No Acute Distress, Alert & Oriented x 3, Appropriate mood Neck: supple, no JVD Heart: S1S2 with RRR without murmurs or gallops Lungs: Clear throughout auscultation bilaterally Abd: soft, nontender, nondistended, with good bowel sounds Ext: no edema bilaterally Sternal incision: clean, dry, and intact Skin: warm and dry   
    
56year old female with triple vessel CAD s/p CABG along with MAZE and MADI clipping now with AF with RVR. She is not on a beta blocker. She is on both IV and oral amiodarone. She has now converted back to NSR.   
    
Exam:   
WNWD NAD   
MMM Neck veins flat, no carotid bruits S1S2 RRR no m/r/g CTAB   
S NTTP BS+   
WWP no c/c/e Nonfocal, AAOx3 No breakdown/rashes Mood/affect stable Reviewed physician notes, labs, imaging, telemetry and cardiac studies.   
    
ECG: Accelerated junctional rhythm.   
    
Echo: EF WNL, mild LAE   
    
A/P:   
Atrial fibrillation, paroxysmal, post surgical   
CAD s/p CABG Essential HTN   
CKD GERD Recovering PNA Pulmonary edema AMOR DMII Bradycardia   
    
-AF - would avoid use of both IV and oral amiodarone. Will d/c IV amiodarone. This is common post AF and likely to recur. Pt should be on beta blockers to help mitigate RVR. Will add coreg to help with HR and elevated BP. Currently in NSR. Would recheck ECG.  Likely exacerbated by hypokalemia and agree with repletion. -HTN - increase ACE dose, follow renal function closely. Improving BP will also help with CKD. -CKD - improving daily. -CAD - OMT. -Stroke ppx - would add [address] when clinically able unless contraindication despite MADI clip. Likely will only need for 3-4 months. -DMII - continue with insulin. -AMOR - on CPAP. Meds:   
· amLODIPine (NORVASC) tablet 10 mg 10 mg Oral DAILY · amiodarone (CORDARONE) 450 mg in dextrose 5% 250 mL infusion 0.5-1 mg/min IntraVENous TITRATE · pantoprazole (PROTONIX) tablet 40 mg 40 mg Oral ACB · Saccharomyces boulardii (FLORASTOR) capsule 500 mg 500 mg Oral BID · lisinopriL (PRINIVIL, ZESTRIL) tablet 20 mg 20 mg Oral DAILY · insulin glargine (LANTUS) injection 28 Units 28 Units SubCUTAneous QHS · potassium chloride (K-DUR, KLOR-CON) SR tablet 40 mEq 40 mEq Oral BID PRN   
· insulin lispro (HUMALOG) injection 0-10 Units 0-10 Units SubCUTAneous AC&HS · hydrocortisone (ANUSOL-HC) 2.5 % rectal cream PeriANAL QID PRN   
· hydrALAZINE (APRESOLINE) 20 mg/mL injection 20 mg 20 mg IntraVENous Q4H PRN · potassium chloride 20 mEq in 100 ml IVPB Dose: 20 mEq Freq: ONCE Route: IV   
· rosuvastatin (CRESTOR) tablet 40 mg 40 mg Oral QHS · amiodarone (CORDARONE) tablet 200 mg 200 mg Oral Q12H · aspirin chewable tablet 81 mg 81 mg Oral DAILY Labs: WBC: 15.9 (H) RBC: 3.31 (L) HGB: 9.5 (L) HCT: 28.7 (L) MCV: 86.7 MCH: 28.7 MCHC: 33.1 RDW: 14.8 (H) PLATELET: 877 MPV: 10.7 ABSOLUTE NRBC: 0.00 Sodium: 140 Potassium: 2.6 (LL) Chloride: 104 CO2: 29 Anion gap: 7 Glucose: 143 (H) BUN: 35 (H) Creatinine: 1.54 (H) Calcium: 8.0 (L) Magnesium: 2.5 (H) GFR est non-AA: 36 (L)   
GFR est AA: 44 (L)   
  
   
Coronary Artery Bypass Graft (CABG) - Care Day 11 (10/9/2020) by Kati Chandler RN  
 
   
Review Status  Review Entered Completed  10/9/2020 12:26   
   
 Criteria Review Care Day: 11 Care Date: 10/9/2020 Level of Care: ICU Guideline Day 3 Clinical Status ( ) * Dangerous arrhythmia absent   
(X) * Pain absent or managed Activity   
(X) * Minimal ambulatory activity 10/9/2020 12:25:57 EDT by Zev Fowler PT/OT Routes   
(X) * Oral medications 10/9/2020 12:25:57 EDT by Tim Villareal   
  Florastor 500mg po 2x/day   
( ) * Advance diet Interventions   
(X) * Chest tube absent   
( ) * Central lines absent Medications   
(X) * Epidural analgesics absent [M]   
(X) Prophylactic antibiotics discontinued (X) Aspirin 10/9/2020 12:23:49 EDT by Tim Villareal   
  ASA 81mg po q day   
(X) Beta-blocker 10/9/2020 12:23:49 EDT by Timbella Villareal   
  Lopressor 25mg po q 12hrs (X) Antihypertensive medication if needed 10/9/2020 12:23:49 EDT by Timbella Villareal   
  Cordarone 200mg po q 12hrs Hydralazine 20 mg IV q 4hrs prn x 1 dose * Milestone Additional Notes 10/9/20 Pulmonary Progress Note Extubated, awake and following commands Off all drips Using IS Up in chair Physical Exam:           
    
EENMT:  Sclera clear, pupils equal, oral mucosa moist   
Respiratory:  Less crackles Cardiovascular:  RRR with no M,G,R;   
  
  
Assessment:  (Medical Decision Making)   
  Hospital Problems   
    
  YURI (acute kidney injury) ([facility]   
    
  Hypotension   
    
  Acute respiratory failure with hypoxia (HCC)   
    
  Bilateral pulmonary infiltrates on chest x-ray   
  Improved-increased procal but cultures negative   
    
  
  
Plan:  (Medical Decision Making)   
    
- on Cefepime per ID   
- respiratory culture NGTD 10/6, blood cultures 10/7   
- SSI   
-IV Protonix   
-replace K   
-can go to telemetry   
  Infectious Disease Progress Note Remains on room air, sitting in a chair; had several loose stools overnight per nursing BP (!) 177/74 Pulse 65 Temp 98.3 °F (36.8 °C) Resp (!) 34   
  
  
  
O2 sat 87% RA General:         Alert, no acute distress; sitting in chair Head:                          Normocephalic, atraumatic Eyes:                           Anicteric sclerae, small laceration above L eye Mouth:                        Moist mucosa, op clear Neck:                          Supple, symmetrical, trachea midline, no JVD Lungs:                        Clear anterior lungs; no wheezing;   
CV:                              Regular rate and rhythm without audible murmur; sternal incision bandaged; Abdomen:                  Soft, non tender, mildly distended; active bowel sounds noted; Extremities:               Trace LE edema; LLE incisions with mild erythema around the sites, no drainage Pulses:                       2+ DP bilaterally Musculoskeletal:       Moves all extremities with equal strength, no deformity Skin:                           No acute rash Psych:                                    Alert, appropriate without evidence of thought disorder Impression:   
\" Leukocytosis \" Fever \" Acute respiratory failure, s/p emergent intubation and bronch 10/2, culture negative; viral negative, fungal, atypical pending - suspect volume overload as the cause \" YURI, improved \" CABG x 4 9/29   
    
Plan:   
\" Her WBC is rising and she had 5 BMs documented overnight.  Clinically, she looks great.  We haven't identified an infection clearly, she has been treated adequately for HCAP, and she is now on room air and comfortable. \" Based on all of this, I think the best plan is to observe off of antibiotics for the time being. \" If her stools continue to be an issue, then I would send stool for C diff and / or treat empirically.  Today nursing was skeptical that her stools were suspicious for C diff, so I am comfortable waiting. \" ID will continue to follow Cardiothoracic Surgery Progress Note Patient feels better today. She was able to walk with PT Physical Exam:   
General: Well Developed, Obese, No Acute Distress, Alert & Oriented x 3, Appropriate mood Neck: supple, no JVD Heart: S1S2 with RRR without murmurs or gallops Lungs: Clear throughout auscultation bilaterally Abd: soft, nontender, nondistended, with good bowel sounds Ext: no edema bilaterally Sternal incision: clean, dry, and intact Skin: warm and dry Assessment/Plan:   
    
Principal Problem:   
  S/P CABG x 4 (9/29/2020) Was re-intubated 10/2, now extubated and on room air, afebrile, cultures neg to date, no DVT in upper or lower extremities, on IV antibiotics, ID following   
    
Active Problems:   
  Paroxysmal atrial fibrillation ([address] (9/18/2020) In sinus rhythm currently, intermittent a-fib 10/7   
    
  Paroxysmal A-fib ([address] (9/29/2020)   
  S/p MAZE, in sinus currently   
    
  Atherosclerosis of native coronary artery of native heart with unstable angina pectoris ([address] (9/29/2020)     
    
  CAD (coronary artery disease) (9/29/2020) S/p CABG   
    
  AMOR on CPAP (9/30/2020)     
  Pulmonary edema (10/1/2020) Improved     
  Anxiety (10/1/2020)     
    
  Acute respiratory failure with hypoxia ([address] (10/2/2020) Extubated, resolved   
    
  Bilateral pulmonary infiltrates on chest x-ray (10/2/2020) Chest xray improved, stopped lasix drip given worsening renal function   
    
  Hypotension (10/3/2020) Resolved   
    
  YURI (acute kidney injury) ([address] (10/5/2020) Renal function much improved, nephrology following   
    
DM Was not on any meds at home, Hgb A1C 8.2 pre-op, on Lantus, continue to titrate as needed   
    
Hypertension BP elevated today, ACE-I resumed by nephrology   
    
  
  
PT Note Ms. Daly presents with decreased bed mobility, transfers, ambulation, balance, activity tolerance, strength and overall general functional mobility s/p hospital admission with above, now CABG x 4 on 20. Pt with complications post surgery, extubated  10/8/20  No O2 . Prior to surgery, pt works as hospice RN, independent at baseline. Pt lives alone per chart, pt states \"in-laws are in and out\", spouse . Pt in chair on arrival, RN present at bedside.  Patient is agreeable to therapy.  Requesting to get to the bathroom.  Pt required min A for transfers to the General acute hospital with min assist for cleaning.  Gait with the cardiac walker x 150 feet with slow but safe sandra.  Patient is returned to the recliner and after a short rest break the patient is instructed in therapeutic exercises.  Tolerated well.  Patient is left sitting in the recliner with needs within reach with lap belt intact. PT to follow for acute care needs to address deficits noted above. Anticipate pt to do well with activity due to prior level of function of independent.  Will continue PT efforts.     
  
  
REHAB RECOMMENDATIONS (at time of discharge pending progress):     
Placement: It is my opinion, based on this patient's performance to date, that Ms. Daly may benefit from participating in 1-2 additional therapy sessions in order to continue to assess for rehab potential and then make recommendation for disposition at discharge. WBC 27.6* HGB 9.9*   
HCT 29.9*   
*  140   
K 2.5* 3.5  103 CO2 31 30 * 175 BUN 41* 38   
CREA 1.75* 1.66   
MG 2.6* PHOS 3.1 CA 8.5                  8.6 GLUCOSE,FAST -  (H) 270 (H) No imaging Orders SSI Lantus 28 units SQ q hs KCL 20 mEq IVPB x 3 doses

## 2020-10-12 NOTE — PROGRESS NOTES
[facility]        Subjective: A on CKD   No complaints  Feels fine. Review of Systems -   General ROS: negative for - fever, chills  Respiratory ROS: no SOB, cough, SIMONS  Cardiovascular ROS: no CP, palpitations  Gastrointestinal ROS: no N&V, abdominal pain, diarrhea  Genito-Urinary ROS: no difficulty voiding, dysuria  Neurological ROS: no seizures, focal weekness        Objective:    Vitals:    10/12/20 0001 10/12/20 0306 10/12/20 0733 10/12/20 0911   BP: (!) 173/76 (!) 176/74 (!) 175/73    Pulse: (!) 59 74 68    Resp:  18 18    Temp:  97.6 °F (36.4 °C) 98.9 °F (37.2 °C)    SpO2:  97% 97% 99%   Weight:  83.5 kg (184 lb 1.6 oz)     Height:           PE  Gen: in no acute distress  CV:reg rate  Chest:clear  Abd: soft  Ext/Access: no edema       . LAB  Recent Labs     10/10/20  0316   WBC 15.9*   HGB 9.5*   HCT 28.7*        Recent Labs     10/12/20  0312 10/11/20  0336 10/10/20  1522  10/10/20  0316    141  --   --  140   K 4.4 3.2* 3.3*   < > 2.6*   * 108*  --   --  104   CO2 23 26  --   --  29   * 194*  --   --  143*   BUN 22 29*  --   --  35*   CREA 1.31* 1.45*  --   --  1.54*   MG 2.3 2.3  --   --  2.5*   CA 8.5 8.3  --   --  8.0*    < > = values in this interval not displayed.            Radiology    A/P:   Patient Active Problem List   Diagnosis Code    Paroxysmal atrial fibrillation (HCC) I48.0    Essential hypertension I10    Mixed hyperlipidemia E78.2    Uterine cancer ([facility] [address] C55    Chest pain R07.9    Atrial fibrillation (HCC) I48.91    Atherosclerosis of native coronary artery of native heart with unstable angina pectoris (HCC) I25.110    S/P CABG x 4 Z95.1    Suspected sleep apnea R29.818    CAD (coronary artery disease) I25.10    AMOR on CPAP G47.33, Z99.89    Pulmonary edema J81.1    Anxiety F41.9    YURI (acute kidney injury) ([facility] [address] N17.9       A on CKD - creatinine continue to improve, down to 1.3 ( baseline 1.3 - 1.8)    Hypokalemia - resolved with K replacement and aldactone    ASHD - s/p CABG    HTN/ Volume      Jailene Mcarthur MD

## 2020-10-12 NOTE — DIABETES MGMT
Patient seen for education regarding new diagnosis diabetes. Patient states she has a history of prediabetes. Patient has not checked her own sugar or taken medication for diabetes in the past. Patient states she does have a positive family history of diabetes. Patient also states her daughter takes insulin. Patient states she has worked in the [address] [place], [place] [address] \"in all different areas\" and voices that she helped others take insulin and check glucose levels. Patient voice hoarse during assessment. Noted patient has cough and became tired after checking glucose level. Primary RN updated. Discussed with provider that patient will need glucometer prescription at discharge and would recommend Humalog SSI insulin pens and pen needles to treat hyperglycemia prn as patient will not have a PCP for 3 weeks. Patient given educational material, \"Diabetes Self-Management: A Patient Teaching Guide\", which was reviewed with pt. Explained basic physiology of diabetes, as well as causes, s/s, and treatments for hypoglycemia and hyperglycemia. Described the effects of poor glycemic control on the development of long-term complications such as renal, eye, nerve, and cardiovascular disease. Described proper diabetic foot care and the importance of checking feet qd. Patient denies numbness and tingling in feet. Per patient they typically drink unsweetened tea. Reviewed the effects of sweetened beverages on glycemic control and alternative beverages to help improve glycemic control. Noted patient did not eat lunch tray. Educated re: effects of carbohydrates on blood glucose, the \"plate method\" of healthy meal planning, basics of healthy meal plan, Consistent Carbohydrate Diet, discussed the basics of carb counting and how to read a nutrition label. Educated patient regarding the benefits of physical activity (as directed by provider) on glycemic control.  Also explained the relationship between hyperglycemia and infection and delayed healing. Discussed target goals for blood glucose and A1C. Educated patient regarding diabetic medications including mechanism of action, timing, and possible side effects. Creatinine 1.31. GFR 44. Discussed the impact of renal status on diabetes medications. Patient verbalizes understanding of teaching. Explained the importance of blood glucose monitoring. Recommended frequency of 3 times a day (fasting and 2 hours post lunch/supper) blood glucose checks and to record in log book to take to PCP appointment. Demonstrated how to use a blood glucose meter, care of strips, and sharps disposal. Educated patient that all glucometers are similar but differ in small ways. Educated patient that they have to get the glucometer covered by their insurance formulary to keep their costs down. Pt was able to return demonstrate correct use of meter. Pt will need prescription for glucometer and glucometer supplies at discharge so that the patient may obtain the meter covered by their insurance. Patient instructed regarding preparation and injection of insulin dose via vial and syringe method. Patient returned demonstrated proper insulin injection technique using injection model via vial and syringe method. Nursing will continue to have patient practice insulin self-injection when insulin dose is due and document progress or refusals in progress notes. Patient verbalizes understanding of site rotation, storage of insulin, and proper sharps disposal. Patient was also instructed in proper use of insulin pens. Patient was able to return demonstrate proper use of insulin pen using injection model. Patient prefers insulin pens. Patient will need prescription for insulin pens and pen needles at discharge. Educated patient regarding a bolus regimen of Humalog SSI including type of insulin, timing with meals, onset, duration of effect, and peak of insulin dose. Educated patient on sliding scale insulin.  Instructed patient to always seek guidance from their primary care provider about adjusting their insulin doses and not to adjust them on their own as this could negatively impact their glycemic control or result in hypoglycemia. CM working to set patient up with a new PCP as she does not have one. Patient will also have [facility], CM to alert [facility] regarding new diagnosis of diabetes. Patient verbalizes understanding. Patient would likely benefit from continued diabetes outpatient education. Patient provided with contact information to [facility] program to pursue at their convenience after discharge with their PCP, however patient moving to [facility] encouraged patient to discuss outpatient education with her new PCP. Patient verbalized understanding. Encouraged compliance with discharge regimen. Encouraged patient to continue to work on lifestyle modifications and to follow up with new PCP for further titration of regimen. Patient verbalized understanding and voices no further questions regarding diabetes management at this time.

## 2020-10-12 NOTE — PROGRESS NOTES
Pt with discharge orders this day. Pt to discharge home with her daughters to [place], [place]. This CM met with pt this day to discuss home health referral - she is agreeable to [facility] referral to their [place], [place] office. Referral made this day. Pt is also in need of a PCP in [place], [place]. This CM spoke with pt and her daughter, Beau Francisco, regarding any places they would like to try to get pt in at. They requested [facility]. This CM called office and able to get appointment for pt on 11/2/20 @ 11:30am with Dr. Manley Spurling. Pt is agreeable to PCP appointment and agreeable for this CM to send referral to physician office. No additional CM needs at discharge. Milestones met. Care Management Interventions  PCP Verified by CM: Yes(Pt does not have a PCP)  Mode of Transport at Discharge:  Other (see comment)(daughter)  Transition of Care Consult (CM Consult): Discharge Planning, [address]: No  Reason Outside [place]: Out of service area  Discharge Durable Medical Equipment: No  Physical Therapy Consult: Yes  Occupational Therapy Consult: No  Speech Therapy Consult: No  Current Support Network: Own Home, Lives Alone  Confirm Follow Up Transport: Family  The Plan for Transition of Care is Related to the Following Treatment Goals : to daughters home in [place], [place]  The Patient and/or Patient Representative was Provided with a Choice of Provider and Agrees with the Discharge Plan?: Yes  Name of the Patient Representative Who was Provided with a Choice of Provider and Agrees with the Discharge Plan: Ms. Daryl Bianchi of Choice List was Provided with Basic Dialogue that Supports the Patient's Individualized Plan of Care/Goals, Treatment Preferences and Shares the Quality Data Associated with the Providers?: Yes   Resource Information Provided?: No  Discharge Location  Discharge Placement: Home with home health

## 2020-10-12 NOTE — ROUTINE PROCESS
Cardiac Rehab: Spoke with patient regarding referral to cardiac rehab. Patient meets admission criteria based on CABG x4 (09/29/20). Written information about Cardiac Rehab given and reviewed with patient. Discussed lifestyle modifications to promote cardiac wellness. Patient indicated that she wants to participate in the cardiac rehab program in [place], [place] where she lives. We will forward her referral to [facility]  in [place], [place] as requested. Her Cardiologist is Dr. Shawn Garcia. Thank you, LEI FatimaN, RN Cardiopulmonary Rehabilitation Nurse Liaison [facility]

## 2020-10-13 ENCOUNTER — PATIENT OUTREACH (OUTPATIENT)
Dept: CASE MANAGEMENT | Age: 60
End: 2020-10-13

## 2020-10-13 NOTE — PROGRESS NOTES
Patient contacted regarding recent discharge and COVID-19 risk. Discussed COVID-19 related testing which was available at this time. Test results were negative. Patient informed of results, if available? yes    Care Transition Nurse/ Ambulatory Care Manager/ LPN Care Coordinator contacted the family by telephone to perform post discharge assessment. Verified name and  with family as identifiers. Patient has following risk factors of: S/P CABG x 4. CTN/ACM/LPN reviewed discharge instructions, medical action plan and red flags related to discharge diagnosis. Reviewed and educated them on any new and changed medications related to discharge diagnosis. Advised obtaining a 90-day supply of all daily and as-needed medications. Advance Care Planning:   Does patient have an Advance Directive: health care decision makers updated    Education provided regarding infection prevention, and signs and symptoms of COVID-19 and when to seek medical attention with family who verbalized understanding. Discussed exposure protocols and quarantine from [address] you at higher risk for severe illness  and given an opportunity for questions and concerns. The family agrees to contact the COVID-19 hotline [phone] or PCP office for questions related to their healthcare. CTN/ACM/LPN provided contact information for future reference. From CDC: Are you at higher risk for severe illness?  Wash your hands often.  Avoid close contact (6 feet, which is about two arm lengths) with people who are sick.  Put distance between yourself and other people if COVID-19 is spreading in your community.  Clean and disinfect frequently touched surfaces.  Avoid all cruise travel and non-essential air travel.  Call your healthcare professional if you have concerns about COVID-19 and your underlying condition or if you are sick.     For more information on steps you can take to protect yourself, see CDC's How to Protect Yourself      Patient/family/caregiver given information for [employer] Loop and agrees to enroll no  Patient's preferred e-mail:  n/a  Patient's preferred phone number: n/a  Based on Loop alert triggers, patient will be contacted by nurse care manager for worsening symptoms. Plan for follow-up call in 7-14 days based on severity of symptoms and risk factors.

## 2020-10-20 ENCOUNTER — PATIENT OUTREACH (OUTPATIENT)
Dept: CASE MANAGEMENT | Age: 60
End: 2020-10-20

## 2020-10-27 NOTE — PROGRESS NOTES
Patient pre-assessment complete for [facility] scheduled for loop, arrival time 0730. Patient verified using . Patient instructed to bring all home medications in labeled bottles on the day of procedure. NPO status reinforced. Patient instructed to HOLD xarelto, lasix, and diabetic oral medications the morning of the procedure. Instructed they can take all other medications excluding vitamins & supplements. Patient verbalizes understanding of all instructions & denies any questions at this time.

## 2020-10-28 ENCOUNTER — HOSPITAL ENCOUNTER (OUTPATIENT)
Dept: CARDIAC CATH/INVASIVE PROCEDURES | Age: 60
Discharge: HOME | End: 2020-10-28
Attending: INTERNAL MEDICINE | Admitting: INTERNAL MEDICINE
Payer: COMMERCIAL

## 2020-10-28 VITALS
WEIGHT: 175 LBS | DIASTOLIC BLOOD PRESSURE: 56 MMHG | RESPIRATION RATE: 20 BRPM | OXYGEN SATURATION: 98 % | HEART RATE: 65 BPM | SYSTOLIC BLOOD PRESSURE: 126 MMHG | BODY MASS INDEX: 31.01 KG/M2 | HEIGHT: 63 IN

## 2020-10-28 LAB
ERYTHROCYTE [DISTWIDTH] IN BLOOD BY AUTOMATED COUNT: 16.7 % (ref 11.9–14.6)
HCT VFR BLD AUTO: 37.2 % (ref 35.8–46.3)
HGB BLD-MCNC: 12 G/DL (ref 11.7–15.4)
INR PPP: 1.2
MCH RBC QN AUTO: 29 PG (ref 26.1–32.9)
MCHC RBC AUTO-ENTMCNC: 32.3 G/DL (ref 31.4–35)
MCV RBC AUTO: 89.9 FL (ref 79.6–97.8)
NRBC # BLD: 0 K/UL (ref 0–0.2)
PLATELET # BLD AUTO: 378 K/UL (ref 150–450)
PMV BLD AUTO: 10 FL (ref 9.4–12.3)
PROTHROMBIN TIME: 15.2 SEC (ref 12.5–14.7)
RBC # BLD AUTO: 4.14 M/UL (ref 4.05–5.2)
WBC # BLD AUTO: 9.7 K/UL (ref 4.3–11.1)

## 2020-10-28 PROCEDURE — C1764 EVENT RECORDER, CARDIAC: HCPCS

## 2020-10-28 PROCEDURE — 77030041397 HC DRSG PRIMASEAL AG MDII -B

## 2020-10-28 PROCEDURE — 85027 COMPLETE CBC AUTOMATED: CPT

## 2020-10-28 PROCEDURE — 85610 PROTHROMBIN TIME: CPT

## 2020-10-28 PROCEDURE — 74011000250 HC RX REV CODE- 250: Performed by: INTERNAL MEDICINE

## 2020-10-28 PROCEDURE — 33285 INSJ SUBQ CAR RHYTHM MNTR: CPT

## 2020-10-28 RX ORDER — SODIUM CHLORIDE 9 MG/ML
75 INJECTION, SOLUTION INTRAVENOUS CONTINUOUS
Status: DISCONTINUED | OUTPATIENT
Start: 2020-10-28 | End: 2020-10-28 | Stop reason: HOSPADM

## 2020-10-28 RX ORDER — LIDOCAINE HYDROCHLORIDE 10 MG/ML
10-30 INJECTION, SOLUTION EPIDURAL; INFILTRATION; INTRACAUDAL; PERINEURAL ONCE
Status: COMPLETED | OUTPATIENT
Start: 2020-10-28 | End: 2020-10-28

## 2020-10-28 RX ADMIN — LIDOCAINE HYDROCHLORIDE 10 ML: 10 INJECTION, SOLUTION EPIDURAL; INFILTRATION; INTRACAUDAL; PERINEURAL at 09:17

## 2020-10-28 NOTE — DISCHARGE INSTRUCTIONS
LOOP MONITOR INSTRUCTIONS SHEET      Activity  · As tolerated and directed by your doctor  · Bathe or shower as directed by your doctor    Diet  · Resume your previous diet     Pain  ·  Call your doctor if pain is NOT relieved by OTC medication    Dressing Care: Leave dressing on the incision *** . If dressing becomes loose,  You may remove it. Keep area Clean & dry, Do not get incision wet or  let water run over incision. Do not apply any lotions, creams or powder to incision. Follow-Up Phone Calls  · Will be made nursing staff  · If you have any problems, call your doctor as needed    Call your doctor if  · Excessive bleeding that does not stop after holding mild pressure over the area  · Temperature of 101 degrees F or above  · Redness,excessive swelling or bruising, and/or green or yellow, smelly discharge from incision    After Anesthesia  · For the first 24 hours: DO NOT Drive, Drink alcoholic beverages, or Make important decisions  · Be aware of dizziness following anesthesia and while taking pain medication    Medications - Continue home medications as previously prescribed     Other Instructions- Always show the doctor or dentist your loop recorder identification card.       Your doctor's phone number - [phone]

## 2020-10-28 NOTE — PROCEDURES
LOOP RECORDER IMPLANT            PROCEDURE PERFORMED:  Loop implantation. PREOPERATIVE DIAGNOSIS/ indication:  PAF     POSTOPERATIVE DIAGNOSIS:  loop implant     INDICATION: as above     IMPLANT: MRI conditional loop recorder     SPECIMENS REMOVED:  None. ESTIMATED BLOOD LOSS:  1 mL. SURGEON:  Anjali Cruz MD     ASSISTANT:  None. SEDATION: none     ANESTHESIA:  Local anesthesia is lidocaine. COMPLICATIONS:  none. PROCEDURE:  After obtaining informed consent, the patient was brought to the lab. Left pectoral region was cleaned and prepped in the sterile fashion. Lidocaine solution was used for local anesthesia. Utilizing the appropriate kit, a punch incision was made with the included blade. Insertion tool was used to make a tract and the insertable cardiac monitor was implanted subcutaneously without difficulty. A single 4-0 subcuticular Vicryl suture was used for closure. All counts were corrected and there was no complication. The implanted device is [place] linq serial number [id number]    Family and our significant other were sought out and discussion of the procedure and findings took place. Procedure and findings including pertinent se[place] were discussed with the patient immediately post procedure. Opportunity to ask questions was offered. If no one was available in the post procedure waiting area, I can be reached thru paging system or at [phone].

## 2020-10-28 NOTE — PROGRESS NOTES
TRANSFER - IN REPORT:    Verbal report received from DENYS Hernandez on [employer] being received from [address] [place] [address] for routine progression of care      Report consisted of patients Situation, Background, Assessment and Recommendations(SBAR). Information from the following report(s) Procedure Summary was reviewed with the receiving nurse. Opportunity for questions and clarification was provided. Assessment completed upon patients arrival to unit and care assumed.

## 2020-10-28 NOTE — H&P
Reji Scott MD    Physician    Specialty:  Cardiology    Progress Notes       Signed    Encounter Date:  10/20/2020                     []Hide copied text    []Fabián for details             [address], [place]  [address] [facility] [address] [place] Cl[place], [address]  PHONE: [phone]     SUBJECTIVE:   Aiyana Leone is a 56 y.o. female 1960   seen for a follow up visit regarding the following:      No chief complaint on file.           History of present illness: 56 y.o. female  History of Present Illness: The patient presented for TC followup 10/20/2020. Patient with history of non-ST elevated myocardial infarction. The patient underwent cardiac catheterization with multivessel coronary disease. Prior to surgery, she had atrial fibrillation with rapid ventricular response.      09/2020 she underwent CABG with LIMA to LAD, SVG to diagonal, SVG to PDA, SVG to OM, as well as Maze, as well as left atrial clipping of atrial appendage.      Cardiac History:   1. 2020 non-ST elevated myocardial infarction. 2. Atrial fibrillation. Currently in sinus rhythm. She had preoperative atrial fibrillation, status post Maze and atrial clipping. She was not discharged on anticoagulation therapy. 3. Long discussion regarding risks and benefits of anticoagulation therapy. The patient was initiated on Eliquis 10/2020.    4. Aspirin was stopped.       Assessment and Plan:    1. Atrial fibrillation, currently in sinus rhythm. Patient with limited symptoms. We had a long discussion today regarding use of anticoagulation medications, as well as monitoring for atrial fibrillation in the future. The patient is reluctant to initiate anticoagulation long term. We discussed that it should be continued for now. Discussed additional replacement of loop recorder may help characterize atrial fibrillation in the future. 2. Diabetes, uncontrolled. A1c 8.2.   The patient may be a candidate for SGL2 inhibitor therapies in the future. 3. Hypertension, controlled. 4. Hx of Tobacco abuse, controlled. 5. Hoarse refer to ENT        Estimated Creatinine Clearance: 43.8 mL/min (A) (by C-G formula based on SCr of 1.31 mg/dL (H)).             Outpatient Medications Marked as Taking for the 10/20/20 encounter (Office Visit) with Torie Mon MD   Medication Sig Dispense Refill    acetaminophen (TYLENOL) 325 mg tablet Take 2 Tabs by mouth every four (4) hours as needed for Fever.        carvediloL (COREG) 6.25 mg tablet Take 1 Tab by mouth two (2) times daily (with meals). 60 Tab 3    lisinopriL (PRINIVIL, ZESTRIL) 40 mg tablet Take 1 Tab by mouth daily. 30 Tab 3    amLODIPine (NORVASC) 10 mg tablet Take 1 Tab by mouth daily. 30 Tab 3    spironolactone (ALDACTONE) 25 mg tablet Take 1 Tab by mouth daily. 30 Tab 3    guaiFENesin ER (MUCINEX) 600 mg ER tablet Take 2 Tabs by mouth two (2) times a day for 14 days. 56 Tab 0    SITagliptin (JANUVIA) 100 mg tablet Take 1 Tab by mouth daily. 30 Tab 1    amiodarone (CORDARONE) 200 mg tablet Take 1 Tab by mouth every twelve (12) hours for 7 days, THEN 1 Tab daily for 7 days. 21 Tab 0    aspirin 81 mg chewable tablet Take 81 mg by mouth daily. Take morning of procedure.        nitroglycerin (NITROSTAT) 0.4 mg SL tablet 1 Tab by SubLINGual route every five (5) minutes as needed for Chest Pain. Up to 3 doses. (Patient taking differently: 0.4 mg by SubLINGual route every five (5) minutes as needed for Chest Pain. Up to 3 doses.   Bring nitroglycerin with you morning of procedure.) 1 Bottle 5            Past Medical History, Past Surgical History, Family history, Social History, and Medications were all reviewed with the patient today and updated as necessary.               Allergies   Allergen Reactions    Influenza Virus Vaccines Other (comments)       Gives her the flu.           Past Medical History:   Diagnosis Date    Anxiety      Borderline diabetes      Endometrial cancer [facility])      Essential hypertension 9/18/2020    Heart failure ([facility] [address]       EF 55-60% ECHO 9/18/20    History of anemia      Mixed hyperlipidemia 9/18/2020    Paroxysmal atrial fibrillation ([facility] [address] 9/18/2020 9/18/20- SB at PAT  appt 9/28/20    Sleep apnea       uses CPAP QHS            Past Surgical History:   Procedure Laterality Date    HX CYST REMOVAL         from scalp as kid    HX CYST REMOVAL        HX HEART CATHETERIZATION   09/18/2020    HX HYSTERECTOMY        HX ORTHOPAEDIC         left elbow surgery      Family History   Problem Relation Age of Onset    Coronary Artery Disease Mother      Cancer Mother      Hypertension Mother      Coronary Artery Disease Father      Hypertension Father      Coronary Artery Disease Brother      Hypertension Brother      Lung Disease Brother        Social History            Tobacco Use    Smoking status: Current Every Day Smoker       Packs/day: 0.50    Smokeless tobacco: Never Used   Substance Use Topics    Alcohol use: Not Currently         ROS:     Review of Systems   Constitution: Negative for decreased appetite and fever. HENT: Negative for congestion and nosebleeds. Eyes: Negative for blurred vision. Respiratory: Negative for cough and hemoptysis. Endocrine: Negative for polydipsia. Hematologic/Lymphatic: Negative for adenopathy and bleeding problem. Skin: Negative for flushing. Musculoskeletal: Negative for falls. Gastrointestinal: Negative for abdominal pain. Genitourinary: Negative for frequency. Neurological: Negative for seizures. Psychiatric/Behavioral: Negative for suicidal ideas.    Allergic/Immunologic: Negative for hives.            PHYSICAL EXAM:     There were no vitals taken for this visit.            Wt Readings from Last 3 Encounters:   10/12/20 184 lb 1.6 oz (83.5 kg)   09/28/20 181 lb 6.4 oz (82.3 kg)   09/22/20 178 lb 12.8 oz (81.1 kg)          BP Readings from Last 3 Encounters: 10/12/20 (!) 147/67   09/28/20 (!) 225/85   09/22/20 (!) 185/80            Physical Exam   Constitutional: She is oriented to person, place, and time. She appears well-developed. HENT:   Head: Normocephalic and atraumatic. Eyes: Pupils are equal, round, and reactive to light. Conjunctivae are normal.   Neck: Normal range of motion. No JVD present. Cardiovascular: Normal rate. No murmur heard. stenotomy site clean dry intact. Pulmonary/Chest: Effort normal. No respiratory distress. She has no wheezes. Sternotomy clean dry intact. Abdominal: Soft. She exhibits no distension. There is no abdominal tenderness. Musculoskeletal:         General: No edema. Neurological: She is alert and oriented to person, place, and time. No cranial nerve deficit. Skin: Skin is warm and dry. No rash noted. No erythema. Psychiatric: She has a normal mood and affect. Her behavior is normal.         Medical problems and test results were reviewed with the patient today.                        Lab Results   Component Value Date/Time     Cholesterol, total 167 09/19/2020 05:11 AM     HDL Cholesterol 38 (L) 09/19/2020 05:11 AM     LDL, calculated 69 09/19/2020 05:11 AM     VLDL, calculated 60 (H) 09/19/2020 05:11 AM     Triglyceride 300 (H) 09/19/2020 05:11 AM     CHOL/HDL Ratio 4.4 09/19/2020 05:11 AM                   PLAN  Diagnoses and all orders for this visit:     1. YURI (acute kidney injury) ([address]     2. Atherosclerosis of native coronary artery of native heart with unstable angina pectoris ([address]     3. Paroxysmal atrial fibrillation ([address]  -     IMPLANT  LOOP RECORDER; Future     4. Essential hypertension     5. Hoarse  -     REFERRAL TO ENT-OTOLARYNGOLOGY     Other orders  -     rivaroxaban (XARELTO) 15 mg tab tablet; Take 1 Tab by mouth daily (with breakfast).       Follow-up and Dispositions  ·   Return in about 3 months (around 1/20/2021).                   Mayur Wetzel MD  10/20/2020  9:56 AM    Electronically signed by Xin Sanabria MD at 10/20/20 6509   Electronically signed by Xin Sanabria MD at 10/21/20 1842   Note Details     Author  Xin Sanabria MD  File Time  10/21/20 8247    Author Type  Physician  Status  Signed    Last   Xin Sanabria MD  Specialty  Cardiology        Office Visit on 10/20/2020          Revision History          Detailed Report         Note shared with patient

## 2020-10-28 NOTE — PROGRESS NOTES
Patient received to [address] room # 10 via wheelchair from [facility]. Patient scheduled for loop today with Dr Sona High. Procedure reviewed & questions answered, voiced good understanding consent obtained & placed on chart. All medications and medical history reviewed. Will prep patient per orders. Patient & family updated on plan of care. The patient has a fraility score of 4-VULNERABLE, based on patient A&Ox3, patient requires wheelchair assistance to prep room, patient with history of CABG 2 weeks ago.

## 2020-10-28 NOTE — PROGRESS NOTES
TRANSFER - OUT REPORT:  Loop recorder placement with Dr. Heath Contreras  Lidocaine 1%  Wound closed with sutures and covered with water resistant dressing. Verbal report given to cpru RN(name) on Mitzy Lott  being transferred to cpru(unit) for routine progression of care       Report consisted of patients Situation, Background, Assessment and   Recommendations(SBAR). Information from the following report(s) SBAR, Kardex, Procedure Summary and MAR was reviewed with the receiving nurse. Lines:   Peripheral IV 10/28/20 Left;Posterior Hand (Active)        Opportunity for questions and clarification was provided.       Patient transported with:   [email or web address]

## 2020-11-04 LAB
FUNGUS CULTURE, RFCO2T: NORMAL
FUNGUS SMEAR, RFCO1T: NORMAL
FUNGUS SPEC CULT: NORMAL
FUNGUS STAIN, 188244: NORMAL
REFLEX TO ID, RFCO3T: NORMAL
SPECIMEN SOURCE: NORMAL
SPECIMEN SOURCE: NORMAL

## 2020-11-17 LAB
ACID FAST STN SPEC: NEGATIVE
MYCOBACTERIUM SPEC QL CULT: NEGATIVE
SPECIMEN PREPARATION: NORMAL
SPECIMEN SOURCE: NORMAL

## 2021-03-18 LAB
ALDOST SERPL-MCNC: 2 NG/DL (ref 0–30)
RENIN PLAS-CCNC: 0.97 NG/ML/HR (ref 0.17–5.38)
SPECIMEN SOURCE: NORMAL

## 2022-03-18 PROBLEM — G47.33 OSA ON CPAP: Status: ACTIVE | Noted: 2020-09-30

## 2022-03-18 PROBLEM — I25.10 CAD (CORONARY ARTERY DISEASE): Status: ACTIVE | Noted: 2020-09-29

## 2022-03-18 PROBLEM — I10 ESSENTIAL HYPERTENSION: Status: ACTIVE | Noted: 2020-09-18

## 2022-03-18 PROBLEM — I48.0 PAROXYSMAL ATRIAL FIBRILLATION (HCC): Status: ACTIVE | Noted: 2020-09-18

## 2022-03-18 PROBLEM — Z99.89 OSA ON CPAP: Status: ACTIVE | Noted: 2020-09-30

## 2022-03-19 PROBLEM — F41.9 ANXIETY: Status: ACTIVE | Noted: 2020-10-01

## 2022-03-19 PROBLEM — I48.91 ATRIAL FIBRILLATION (HCC): Status: ACTIVE | Noted: 2020-09-18

## 2022-03-19 PROBLEM — J81.1 PULMONARY EDEMA: Status: ACTIVE | Noted: 2020-10-01

## 2022-03-19 PROBLEM — N17.9 AKI (ACUTE KIDNEY INJURY) (HCC): Status: ACTIVE | Noted: 2020-10-05

## 2022-03-19 PROBLEM — E78.2 MIXED HYPERLIPIDEMIA: Status: ACTIVE | Noted: 2020-09-18

## 2022-03-19 PROBLEM — I25.110 ATHEROSCLEROSIS OF NATIVE CORONARY ARTERY OF NATIVE HEART WITH UNSTABLE ANGINA PECTORIS (HCC): Status: ACTIVE | Noted: 2020-09-29

## 2022-03-19 PROBLEM — R29.818 SUSPECTED SLEEP APNEA: Status: ACTIVE | Noted: 2020-09-29

## 2022-03-19 PROBLEM — Z95.1 S/P CABG X 4: Status: ACTIVE | Noted: 2020-09-29

## 2022-03-19 PROBLEM — C55 UTERINE CANCER (HCC): Status: ACTIVE | Noted: 2020-09-18

## 2022-03-19 PROBLEM — R07.9 CHEST PAIN: Status: ACTIVE | Noted: 2020-09-18

## 2023-01-01 NOTE — DISCHARGE INSTRUCTIONS
DISPOSITION: The patient is being discharged home in stable condition on a low saturated fat, low cholesterol and low salt diet. The patient is instructed to advance activities as tolerated to the limit of fatigue or shortness of breath. The patient is instructed to avoid all heavy lifting, straining, stooping or squatting for 3-5 days. The patient is instructed to watch the cath site for bleeding/oozing; if seen, the patient is instructed to apply firm pressure with a clean cloth and call [facility] Cardiology at [phone]. The patient is instructed to watch for signs of infection which include: increasing area of redness, fever/hot to touch or purulent drainage at the catheterization site. The patient is instructed not to soak in a bathtub for 7-10 days, but is cleared to shower. The patient is instructed to call the office or return to the ER for immediate evaluation for any shortness of breath or chest pain not relieved by NTG. Cardiac Catheterization/Angiography Discharge Instructions    *Check the puncture site frequently for swelling or bleeding. If you see any bleeding, lie down and apply pressure over the area with a clean town or washcloth. Notify your doctor for any redness, swelling, drainage or oozing from the puncture site. Notify your doctor for any fever or chills. *If the leg or arm with the puncture becomes cold, numb or painful, call [address] [place] [address] 121 Cardiology at [phone]. *Activity should be limited for the next 48 hours. Climb stairs as little as possible and avoid any stooping, bending or strenuous activity for 48 hours. No heavy lifting (anything over 10 pounds) for three days. *Do not drive for 48 hours. *You may resume your usual diet. Drink more fluids than usual.    *Have a responsible person drive you home and stay with you for at least 24 hours after your heart catheterization/angiography. *You may remove the bandage from your Right and Arm in 24 hours.  You may shower in 24 hours. No tub baths, hot tubs or swimming for one week. Do not place any lotions, creams, powders, ointments over the puncture site for one week. You may place a clean band-aid over the puncture site each day for 5 days. Change this daily. Patient Education        Learning About Coronary Artery Bypass Graft Surgery  What is bypass surgery? Coronary artery bypass surgery is a surgery to treat coronary artery disease. The surgery helps blood make a detour, or bypass, around one or more narrowed or blocked coronary arteries. Coronary arteries are the blood vessels that bring blood to the heart. The surgery is also called bypass surgery or coronary artery bypass graft (CABG). Your doctor will make a bypass using a piece of blood vessel from another part of your body. Your doctor will attach, or graft, this blood vessel above and below the narrowed or blocked section of your artery. How is bypass surgery done? The most common way to do bypass surgery is through a large cut, called an incision, in the chest. This is called open-chest surgery. Your doctor will make the cut in the skin over your breastbone (sternum). Then the doctor will cut through your sternum to reach your heart and coronary arteries. The doctor will connect you to a heart-lung bypass machine. This machine will let the doctor stop your heart while he or she works. The doctor will use a blood vessel from your chest, arm, or leg to bypass the narrowed or blocked arteries. When the blood vessels are in place, the doctor will restart your heart. The doctor will use wire to put your sternum back together. The wire will stay in your chest. You will get stitches or staples to close the cuts in your skin. The cuts will leave scars that may fade in time. Some hospitals offer less invasive bypass surgery. This includes surgery that is done without stopping the heart.  The surgery also may be done through smaller cuts in the chest.  What can you expect after bypass surgery? You will stay in the hospital for at least 3 to 8 days after the surgery. You will feel tired and sore for the first few weeks. Your chest, shoulders, and upper back may ache. You may have some swelling or pain in the area where the healthy vein was taken. These symptoms usually get better in 4 to 6 weeks. It may take 1 to 2 months before your energy level is back to normal.  You will probably be able to do many of your usual activities after 4 to 6 weeks. But for 2 to 3 months you will not be able to lift heavy objects or do activities that strain your chest or upper arm muscles. After surgery, you will still need to make changes in your lifestyle. This lowers your risk of a heart attack or stroke. To help the bypass last as long as possible:  · Take your heart medicines. · Do not smoke. · Eat a heart-healthy diet. · Get regular exercise. · Stay at a healthy weight or lose weight if you need to. · Reduce stress. Smoking can make it harder for you to recover. It will raise the chances of your arteries getting narrowed or blocked again. If you need help quitting, talk to your doctor about stop-smoking programs and medicines. These can increase your chances of quitting for good. You will likely start a cardiac rehabilitation (rehab) program in the hospital. This program will continue after you go home. It will help you recover. And it can prevent future problems with your heart. Talk to your doctor about whether rehab is right for you. Follow-up care is a key part of your treatment and safety. Be sure to make and go to all appointments, and call your doctor if you are having problems. It's also a good idea to know your test results and keep a list of the medicines you take. Where can you learn more? Go to http://tabitha-julia.info/  Enter O924 in the search box to learn more about \"Learning About Coronary Artery Bypass Graft Surgery. \"  Current as of: December 16, 2019               Content Version: 12.6  © 2006-2020 [employer]. Care instructions adapted under license by [employer] (which disclaims liability or warranty for this information). If you have questions about a medical condition or this instruction, always ask your healthcare professional. [address] any warranty or liability for your use of this information. DISCHARGE SUMMARY from Nurse    PATIENT INSTRUCTIONS:    After general anesthesia or intravenous sedation, for 24 hours or while taking prescription Narcotics:  · Limit your activities  · Do not drive and operate hazardous machinery  · Do not make important personal or business decisions  · Do  not drink alcoholic beverages  · If you have not urinated within 8 hours after discharge, please contact your surgeon on call. Report the following to your surgeon:  · Excessive pain, swelling, redness or odor of or around the surgical area  · Temperature over 100.5  · Nausea and vomiting lasting longer than 4 hours or if unable to take medications  · Any signs of decreased circulation or nerve impairment to extremity: change in color, persistent  numbness, tingling, coldness or increase pain  · Any questions    What to do at Home:  Recommended activity: Activity as tolerated. *  Please give a list of your current medications to your Primary Care Provider. *  Please update this list whenever your medications are discontinued, doses are      changed, or new medications (including over-the-counter products) are added. *  Please carry medication information at all times in case of emergency situations. These are general instructions for a healthy lifestyle:    No smoking/ No tobacco products/ Avoid exposure to second hand smoke  Surgeon General's Warning:  Quitting smoking now greatly reduces serious risk to your health.     Obesity, smoking, and sedentary lifestyle greatly increases your risk for illness    A healthy diet, regular physical exercise & weight monitoring are important for maintaining a healthy lifestyle    You may be retaining fluid if you have a history of heart failure or if you experience any of the following symptoms:  Weight gain of 3 pounds or more overnight or 5 pounds in a week, increased swelling in our hands or feet or shortness of breath while lying flat in bed. Please call your doctor as soon as you notice any of these symptoms; do not wait until your next office visit. The discharge information has been reviewed with the patient. The patient verbalized understanding. Discharge medications reviewed with the patient and appropriate educational materials and side effects teaching were provided. ___________________________________________________________________________________________________________________________________    Patient Education      Amiodarone (Cordarone, Pacerone) - (By mouth)   Why this medicine is used:   Treats heart rhythm problems. Contact a nurse or doctor right away if you have:  · Blistering, peeling, red skin rash  · Fast, pounding, or uneven heartbeat; lightheadedness or fainting  · Blurred vision or other vision changes; yellow skin or eyes, hair loss  · Dark urine or pale stools, loss of appetite, stomach pain  · Weight gain or loss, nervousness, tremors, trouble sleeping, unusual tiredness     Common side effects:  · Mild nausea, vomiting  · Constipation  © 2017 [address] Information is for End User's use only and may not be sold, redistributed or otherwise used for commercial purposes. Patient Education      Aspirin (Robinson Extra Strength, Robinson Aspirin Children's, Bufferin, Bufferin Low Dose) - (By mouth)   Why this medicine is used:   Treats pain, fever, and inflammation. May also reduce the risk of heart attack.   Contact a nurse or doctor right away if you have:  · Bloody vomit or vomit that looks like coffee grounds  · Blood in urine or bloody or black, tarry stools  · Wheezing or trouble breathing     Common side effects:  · Upset stomach  © 2017 [facility] Information is for End User's use only and may not be sold, redistributed or otherwise used for commercial purposes. Patient Education      Metoprolol (Lopressor, Toprol XL) - (By mouth)   Why this medicine is used:   Treats high blood pressure, chest pain, and heart failure. Contact a nurse or doctor right away if you have:  · Lightheadedness, dizziness, fainting  · Rapid weight gain; swelling in your hands, ankles, or feet     Common side effects:  · Mild dizziness  · Tiredness  © 2017 [facility] Information is for End User's use only and may not be sold, redistributed or otherwise used for commercial purposes. Patient Education      Nitroglycerin (Nitro-Time) - (By mouth)   Why this medicine is used:   Treats or prevents angina (chest pain). Contact a nurse or doctor right away if you have:  · Throbbing, severe, or ongoing headache, low fever  · Confusion or trouble seeing  · Severe or ongoing dizziness, lightheadedness, or fainting     Common side effects:  · Feeling of warmth, redness of the face, neck, arms, and upper chest  · Headache  © 2017 [address] is for End User's use only and may not be sold, redistributed or otherwise used for commercial purposes. Patient Education      Rivaroxaban (Xarelto, Xarelto Starter Pack) - (By mouth)   Why this medicine is used:   Treats and prevents blood clots.   Contact a nurse or doctor right away if you have:  · Sudden or severe headache  · Back pain, numbness, tingling, weakness in your legs or feet  · Loss of bladder or bowel control  · Bloody vomit or vomit that looks like coffee grounds; bloody or black, tarry stools  · Bleeding that does not stop or bruises that do not heal     Common side effects:  · Minor bleeding or bruising  © 2017 [facility] Information is for End User's use only and may not be sold, redistributed or otherwise used for commercial purposes. Patient Education        Atrial Fibrillation: Care Instructions  Your Care Instructions     Atrial fibrillation is an irregular and often fast heartbeat. Treating this condition is important for several reasons. It can cause blood clots, which can travel from your heart to your brain and cause a stroke. If you have a fast heartbeat, you may feel lightheaded, dizzy, and weak. An irregular heartbeat can also increase your risk for heart failure. Atrial fibrillation is often the result of another heart condition, such as high blood pressure or coronary artery disease. Making changes to improve your heart condition will help you stay healthy and active. Follow-up care is a key part of your treatment and safety. Be sure to make and go to all appointments, and call your doctor if you are having problems. It's also a good idea to know your test results and keep a list of the medicines you take. How can you care for yourself at home? Medicines    · Take your medicines exactly as prescribed. Call your doctor if you think you are having a problem with your medicine. You will get more details on the specific medicines your doctor prescribes.     · If your doctor has given you a blood thinner to prevent a stroke, be sure you get instructions about how to take your medicine safely. Blood thinners can cause serious bleeding problems.     · Do not take any vitamins, over-the-counter drugs, or herbal products without talking to your doctor first.   Lifestyle changes    · Do not smoke. Smoking can increase your chance of a stroke and heart attack. If you need help quitting, talk to your doctor about stop-smoking programs and medicines. These can increase your chances of quitting for good.     · Eat a heart-healthy diet.     · Stay at a healthy weight.  Lose weight if you need to.     · Limit alcohol to 2 drinks a day for men and 1 drink a day for women. Too much alcohol can cause health problems.     · Avoid colds and flu. Get a pneumococcal vaccine shot. If you have had one before, ask your doctor whether you need another dose. Get a flu shot every year. If you must be around people with colds or flu, wash your hands often. Activity    · If your doctor recommends it, get more exercise. Walking is a good choice. Bit by bit, increase the amount you walk every day. Try for at least 30 minutes on most days of the week. You also may want to swim, bike, or do other activities. Your doctor may suggest that you join a cardiac rehabilitation program so that you can have help increasing your physical activity safely.     · Start light exercise if your doctor says it is okay. Even a small amount will help you get stronger, have more energy, and manage stress. Walking is an easy way to get exercise. Start out by walking a little more than you did in the hospital. Gradually increase the amount you walk.     · When you exercise, watch for signs that your heart is working too hard. You are pushing too hard if you cannot talk while you are exercising. If you become short of breath or dizzy or have chest pain, sit down and rest immediately.     · Check your pulse regularly. Place two fingers on the artery at the palm side of your wrist, in line with your thumb. If your heartbeat seems uneven or fast, talk to your doctor. When should you call for help? Call 911 anytime you think you may need emergency care. For example, call if:    · You have symptoms of a heart attack. These may include:  ? Chest pain or pressure, or a strange feeling in the chest.  ? Sweating. ? Shortness of breath. ? Nausea or vomiting. ? Pain, pressure, or a strange feeling in the back, neck, jaw, or upper belly or in one or both shoulders or arms. ? Lightheadedness or sudden weakness.   ? A fast or irregular heartbeat. After you call 911, the  may tell you to chew 1 adult-strength or 2 to 4 low-dose aspirin. Wait for an ambulance. Do not try to drive yourself.     · You have symptoms of a stroke. These may include:  ? Sudden numbness, tingling, weakness, or loss of movement in your face, arm, or leg, especially on only one side of your body. ? Sudden vision changes. ? Sudden trouble speaking. ? Sudden confusion or trouble understanding simple statements. ? Sudden problems with walking or balance. ? A sudden, severe headache that is different from past headaches.     · You passed out (lost consciousness). Call your doctor now or seek immediate medical care if:    · You have new or increased shortness of breath.     · You feel dizzy or lightheaded, or you feel like you may faint.     · Your heart rate becomes irregular.     · You can feel your heart flutter in your chest or skip heartbeats. Tell your doctor if these symptoms are new or worse. Watch closely for changes in your health, and be sure to contact your doctor if you have any problems. Where can you learn more? Go to http://tabitha-julia.info/  Enter U020 in the search box to learn more about \"Atrial Fibrillation: Care Instructions. \"  Current as of: December 16, 2019               Content Version: 12.6  © 7175-6380 [employer]. Care instructions adapted under license by [employer] (which disclaims liability or warranty for this information). If you have questions about a medical condition or this instruction, always ask your healthcare professional. [place] [address] any warranty or liability for your use of this information. 2023 15:59

## 2023-02-13 DIAGNOSIS — Z95.818 STATUS POST PLACEMENT OF IMPLANTABLE LOOP RECORDER: ICD-10-CM

## 2023-02-13 DIAGNOSIS — I48.0 PAROXYSMAL ATRIAL FIBRILLATION (HCC): Primary | ICD-10-CM

## (undated) DEVICE — BLADE OPHTH 3MM CUT EDGE NDL

## (undated) DEVICE — SUTURE PROL SZ 6-0 L30IN NONABSORBABLE BLU L13MM CC-1 3/8 M8707

## (undated) DEVICE — CATHETER ETER TY TEMP SENS F MBO W URIN M FOLLOWS CDC GUIDELINES TO

## (undated) DEVICE — SUTURE PERMAHAND SZ 2-0 L12X18IN NONABSORBABLE BLK SILK A185H

## (undated) DEVICE — SOLUTION IRRIG 1000ML LAC RINGER PLAS POUR BTL

## (undated) DEVICE — SUTURE SILK PERMAHAND PRECUT 6 X 30 IN SZ 1 BLK BRAID A307H

## (undated) DEVICE — CATH URETH INTMIT ROB 14FR FUN -- USE ITEM 179521

## (undated) DEVICE — BLADE SCALP SURG BARD-PARK 10 --

## (undated) DEVICE — CLIP INT SM TI EZ LD LIG SYS WECK HORZ

## (undated) DEVICE — SINGLE USE SUCTION VALVE MAJ-209: Brand: SINGLE USE SUCTION VALVE (STERILE)

## (undated) DEVICE — APPLIER CLP L9.375IN APER 2.1MM CLS L3.8MM 20 SM TI CLP

## (undated) DEVICE — BASIC SINGLE BASIN-LF: Brand: MEDLINE INDUSTRIES, INC.

## (undated) DEVICE — BANDAGE,GAUZE,BULKEE II,4.5"X4.1YD,STRL: Brand: MEDLINE

## (undated) DEVICE — STERILE HOOK LOCK LATEX FREE ELASTIC BANDAGE 6INX5YD: Brand: HOOK LOCK™

## (undated) DEVICE — SUT PROL 4-0 36IN RB1 DA BLU --

## (undated) DEVICE — SUTURE COAT VCRL SZ 0 L36IN ABSRB VLT CTX L48MM TAPERPOINT J370H

## (undated) DEVICE — MEDI-TRACE CADENCE ADULT, DEFIBRILLATION ELECTRODE -RTS  (10 PR/PK) - ZOLL: Brand: MEDI-TRACE CADENCE

## (undated) DEVICE — SINGLE USE BIOPSY VALVE MAJ-210: Brand: SINGLE USE BIOPSY VALVE (STERILE)

## (undated) DEVICE — SUTURE VCRL SZ 3-0 L27IN ABSRB UD L24MM PS-1 3/8 CIR PRIM J936H

## (undated) DEVICE — APPLIER RMFG CLP LIG MED 23.8 --

## (undated) DEVICE — BLADE SCALP SURG BARD-PARK 11 --

## (undated) DEVICE — 3000CC GUARDIAN II: Brand: GUARDIAN

## (undated) DEVICE — AORTIC PUNCHES ARE USED TO CREATE A UNIFORM OPENING IN BLOOD VESSELS DURING CARDIOVASCULAR SURGERY. THE VESSEL GRAFT IS INSERTED INTO THE CREATED OPENING AND SUTURED TO THE VESSEL WALL. AORTIC LANCETS ARE USED TO MAKE A SMALL UNIFORM CUT IN A BLOOD VESSEL TO FACILITATE INSERTION OF AN AORTIC PUNCH.  PUNCHES COME IN VARIOUS LENGTHS, DIAMETERS AND TIP CONFIGURATIONS.: Brand: CLEANCUT ROTATING AORTIC PUNCH

## (undated) DEVICE — BRONCHOSCOPY PACK: Brand: MEDLINE INDUSTRIES, INC.

## (undated) DEVICE — SUTURE PROL SZ 7-0 L24IN NONABSORBABLE BLU L9.3MM BV-1 3/8 M8702

## (undated) DEVICE — SUTURE PERMAHAND SZ 0 L30IN NONABSORBABLE BLK L30MM PSL 3/8 590H

## (undated) DEVICE — CATHETER THOR 32FR L23IN PVC 6 EYELET STR ATRAUM

## (undated) DEVICE — CANNULA PERF L1.8MM TIP L1MM S STL SHFT BLB SHP TIP FEM

## (undated) DEVICE — CLAMP INSERT: Brand: STEALTH® CLAMP INSERT

## (undated) DEVICE — BLADE SAW W10XL54MM FOR PRI REPEAT STRNOTMY

## (undated) DEVICE — BLADE RMFG SAG STRYKR 19.5X88X --

## (undated) DEVICE — SUTURE ETHBND EXCEL SZ 0 L18IN NONABSORBABLE GRN L36MM CT-1 CX21D

## (undated) DEVICE — CATHETER THOR 32FR L23IN PVC 5 EYELET STR ATRAUM

## (undated) DEVICE — SYR 50ML SLIP TIP NSAF LF STRL --

## (undated) DEVICE — STRIP,CLOSURE,WOUND,MEDI-STRIP,1/2X4: Brand: MEDLINE

## (undated) DEVICE — Device: Brand: JELCO

## (undated) DEVICE — CABG DR DENNIS: Brand: MEDLINE INDUSTRIES, INC.

## (undated) DEVICE — SUTURE NONABSORBABLE L24IN SZ 7-0 M0-5 BV175-8 EP 24 BLU M8745

## (undated) DEVICE — SOLUTION IV 1000ML 0.9% SOD CHL

## (undated) DEVICE — SUTURE NONABSORBABLE MONOFILAMENT 5-0 C-1 1X24 IN PROLENE 8725H

## (undated) DEVICE — 3M™ IOBAN™ 2 ANTIMICROBIAL INCISE DRAPE 6648EZ: Brand: IOBAN™ 2

## (undated) DEVICE — CLAMP ABLAT JAW L65MM L CRV 2 ELECTRD BPLR

## (undated) DEVICE — CLAMP ABLAT JAW L53MM L CRV 2 ELECTRD BPLR

## (undated) DEVICE — STERILE HOOK LOCK LATEX FREE ELASTIC BANDAGE 4INX5YD: Brand: HOOK LOCK™

## (undated) DEVICE — WAX SURG 2.5GM HEMSTAT BNE BEESWAX PARAFFIN ISO PALMITATE

## (undated) DEVICE — APPLIER RMFG CLP LIG SM 9IN --

## (undated) DEVICE — REM POLYHESIVE ADULT PATIENT RETURN ELECTRODE: Brand: VALLEYLAB

## (undated) DEVICE — SOLUTION IRRIG 3000ML 0.9% SOD CHL FLX CONT 0797208] ICU MEDICAL INC]

## (undated) DEVICE — BUTTON SWITCH PENCIL BLADE ELECTRODE, HOLSTER: Brand: EDGE

## (undated) DEVICE — SUT SLK 0 30IN CT1 BLK --

## (undated) DEVICE — SUTURE ETHBND EXCEL SZ 3-0 L36IN NONABSORBABLE GRN RB-1 X558H

## (undated) DEVICE — (D)PREP SKN CHLRAPRP APPL 26ML -- CONVERT TO ITEM 371833

## (undated) DEVICE — SYS INCISION MANAGEMENT -- PREVENA

## (undated) DEVICE — SPONGE LAP 18X18IN STRL -- 5/PK

## (undated) DEVICE — SUTURE ETHBND EXCEL 2-0 L30IN NONABSORBABLE GRN L26MM SH MX563

## (undated) DEVICE — CONNECTOR IV 3/8X3/8X3/8 Y

## (undated) DEVICE — BLADE SURG NO20 S STL STR DISP GLASSVAN

## (undated) DEVICE — KENDALL RADIOLUCENT FOAM MONITORING ELECTRODE RECTANGULAR SHAPE: Brand: KENDALL

## (undated) DEVICE — CATHETER URETH 18FR RED RUB INTMIT ALL PURP

## (undated) DEVICE — AMD ANTIMICROBIAL NON-ADHERENT PAD,0.2% POLYHEXAMETHYLENE BIGUANIDE HCI (PHMB): Brand: TELFA

## (undated) DEVICE — SUTURE VCRL 3-0 L36IN ABSRB UD CT L40MM 1/2 CIR TAPERPOINT J956H

## (undated) DEVICE — CANNULA INJ L2.5IN BLNT TIP 3MM CLR BODY W/ 1 W VLV DLP

## (undated) DEVICE — SUTURE S STL SZ 5 L18IN NONABSORBABLE SIL CCS L48MM 1/2 CIR M653G

## (undated) DEVICE — APPLIER CLP L9.38IN M LIG TI DISP STR RNG HNDL LIGACLP

## (undated) DEVICE — DRAPE SLUSH DISC W44XL66IN ST FOR RND BSIN HUSH SLUSH SYS

## (undated) DEVICE — Device

## (undated) DEVICE — AMD ANTIMICROBIAL GAUZE SPONGES,12 PLY USP TYPE VII, 0.2% POLYHEXAMETHYLENE BIGUANIDE HCI (PHMB): Brand: CURITY

## (undated) DEVICE — 48" PROBE COVER W/GEL, ULTRASOUND, STERILE: Brand: SITE-RITE